# Patient Record
Sex: FEMALE | Race: WHITE | Employment: UNEMPLOYED | URBAN - METROPOLITAN AREA
[De-identification: names, ages, dates, MRNs, and addresses within clinical notes are randomized per-mention and may not be internally consistent; named-entity substitution may affect disease eponyms.]

---

## 2017-03-22 ENCOUNTER — HOSPITAL ENCOUNTER (OUTPATIENT)
Dept: ULTRASOUND IMAGING | Age: 57
Discharge: HOME | End: 2017-03-22
Attending: INTERNAL MEDICINE
Payer: COMMERCIAL

## 2017-03-22 DIAGNOSIS — R10.9 CHRONIC ABDOMINAL PAIN: ICD-10-CM

## 2017-03-22 DIAGNOSIS — R93.89 ABNORMAL FINDING ON CT SCAN: ICD-10-CM

## 2017-03-22 DIAGNOSIS — G89.29 CHRONIC ABDOMINAL PAIN: ICD-10-CM

## 2017-03-22 PROCEDURE — 76700 US EXAM ABDOM COMPLETE: CPT

## 2017-03-23 NOTE — PROGRESS NOTES
Overall your test result are quite good. I do not recommend any treatment changes based on these results.

## 2017-04-04 ENCOUNTER — HOSPITAL ENCOUNTER (OUTPATIENT)
Dept: MAMMOGRAPHY | Age: 57
Discharge: HOME | End: 2017-04-04
Attending: INTERNAL MEDICINE
Payer: COMMERCIAL

## 2017-04-04 DIAGNOSIS — M85.80 OSTEOPENIA: ICD-10-CM

## 2017-04-04 PROCEDURE — 77080 DXA BONE DENSITY AXIAL: CPT

## 2017-04-05 NOTE — PROGRESS NOTES
Your bone density shows osteopenia but not osteoporosis. These values are very slightly worse than prior bone density test.  I recommend you continue exercise and vitamin D/Calcium supplements to protect your bones.

## 2017-04-13 ENCOUNTER — HOSPITAL ENCOUNTER (OUTPATIENT)
Dept: SLEEP MEDICINE | Age: 57
Discharge: HOME | End: 2017-04-13
Payer: COMMERCIAL

## 2017-04-13 PROCEDURE — 95810 POLYSOM 6/> YRS 4/> PARAM: CPT

## 2017-12-27 ENCOUNTER — HOSPITAL ENCOUNTER (OUTPATIENT)
Dept: CT IMAGING | Age: 57
Discharge: HOME | End: 2017-12-27
Attending: OTOLARYNGOLOGY
Payer: MEDICARE

## 2017-12-27 DIAGNOSIS — J32.9 CHRONIC SINUSITIS, UNSPECIFIED LOCATION: ICD-10-CM

## 2017-12-27 PROCEDURE — 70486 CT MAXILLOFACIAL W/O DYE: CPT

## 2018-01-25 ENCOUNTER — HOSPITAL ENCOUNTER (OUTPATIENT)
Dept: SURGERY | Age: 58
Discharge: HOME | End: 2018-01-25

## 2018-01-29 VITALS — WEIGHT: 125 LBS | HEIGHT: 67 IN | BODY MASS INDEX: 19.62 KG/M2

## 2018-01-29 RX ORDER — AZITHROMYCIN 250 MG/1
250 TABLET, FILM COATED ORAL DAILY
COMMUNITY
End: 2018-03-28

## 2018-01-29 NOTE — PERIOP NOTES
Patient verified name, , and surgery as listed in [facility]. Type 1b surgery, phone assessment complete. Orders YES received. Labs per surgeon: none  Labs per anesthesia protocol: hemoglobin to be drawn at outpt lab per protocol; EKG 10/24/16; ECHO 16; most recent cardiology note obtained and placed on chart for reference. Patient answered medical/surgical history questions at their best of ability. All prior to admission medications documented in [facility]. Patient instructed to take the following medications the day of surgery according to anesthesia guidelines with a small sip of water: Claritin, Zantac Timolol eye drops . Hold all vitamins 7 days prior to surgery and NSAIDS 5 days prior to surgery. Medications to be held none    Patient instructed on the following:  Arrive at A Entrance, time of arrival to be called the day before by 1700  NPO after midnight including gum, mints, and ice chips  Responsible adult must drive patient to the hospital, stay during surgery, and patient will  need supervision 24 hours after anesthesia  Use antibacterial soap in shower the night before surgery and on the morning of surgery  Leave all valuables (money and jewelry) at home but bring insurance card and ID on       DOS  Do not wear make-up, nail polish, lotions, cologne, perfumes, powders, or oil on skin. Patient teach back successful and patient demonstrates knowledge of instruction.

## 2018-01-30 ENCOUNTER — HOSPITAL ENCOUNTER (OUTPATIENT)
Dept: LAB | Age: 58
Discharge: HOME | End: 2018-01-30
Attending: OTOLARYNGOLOGY

## 2018-01-30 LAB — HGB BLD-MCNC: 14.4 G/DL (ref 11.7–15.4)

## 2018-01-30 NOTE — PERIOP NOTES
Lab results within limits per anesthesia protocol; OK for surgery.      Recent Results (from the past 12 hour(s))   HEMOGLOBIN    Collection Time: 01/30/18 10:14 AM   Result Value Ref Range    HGB 14.4 11.7 - 15.4 g/dL

## 2018-01-31 ENCOUNTER — ANESTHESIA EVENT (OUTPATIENT)
Dept: SURGERY | Age: 58
End: 2018-01-31
Payer: MEDICARE

## 2018-02-01 ENCOUNTER — ANESTHESIA (OUTPATIENT)
Dept: SURGERY | Age: 58
End: 2018-02-01
Payer: MEDICARE

## 2018-02-01 ENCOUNTER — HOSPITAL ENCOUNTER (OUTPATIENT)
Age: 58
Setting detail: OUTPATIENT SURGERY
Discharge: HOME | End: 2018-02-01
Attending: OTOLARYNGOLOGY | Admitting: OTOLARYNGOLOGY
Payer: MEDICARE

## 2018-02-01 VITALS
BODY MASS INDEX: 20.26 KG/M2 | TEMPERATURE: 96.8 F | WEIGHT: 129.06 LBS | OXYGEN SATURATION: 100 % | HEART RATE: 51 BPM | DIASTOLIC BLOOD PRESSURE: 70 MMHG | RESPIRATION RATE: 18 BRPM | SYSTOLIC BLOOD PRESSURE: 137 MMHG | HEIGHT: 67 IN

## 2018-02-01 PROCEDURE — 74011250637 HC RX REV CODE- 250/637

## 2018-02-01 PROCEDURE — 74011000250 HC RX REV CODE- 250

## 2018-02-01 PROCEDURE — 77030012840 HC ELECTRD COAG SUC CNMD -C: Performed by: OTOLARYNGOLOGY

## 2018-02-01 PROCEDURE — 76210000016 HC OR PH I REC 1 TO 1.5 HR: Performed by: OTOLARYNGOLOGY

## 2018-02-01 PROCEDURE — 77030028681 HC DRSG NSL ABSRB NASOPORE STRY -C: Performed by: OTOLARYNGOLOGY

## 2018-02-01 PROCEDURE — 76210000020 HC REC RM PH II FIRST 0.5 HR: Performed by: OTOLARYNGOLOGY

## 2018-02-01 PROCEDURE — 77030002888 HC SUT CHRMC J&J -A: Performed by: OTOLARYNGOLOGY

## 2018-02-01 PROCEDURE — 77030011640 HC PAD GRND REM COVD -A: Performed by: OTOLARYNGOLOGY

## 2018-02-01 PROCEDURE — 77030002916 HC SUT ETHLN J&J -A: Performed by: OTOLARYNGOLOGY

## 2018-02-01 PROCEDURE — 77030020782 HC GWN BAIR PAWS FLX 3M -B: Performed by: ANESTHESIOLOGY

## 2018-02-01 PROCEDURE — 77030008357 HC SPLNT NSL INT THOM -B: Performed by: OTOLARYNGOLOGY

## 2018-02-01 PROCEDURE — 77030018836 HC SOL IRR NACL ICUM -A: Performed by: OTOLARYNGOLOGY

## 2018-02-01 PROCEDURE — 74011250636 HC RX REV CODE- 250/636: Performed by: ANESTHESIOLOGY

## 2018-02-01 PROCEDURE — 77030008703 HC TU ET UNCUF COVD -A: Performed by: ANESTHESIOLOGY

## 2018-02-01 PROCEDURE — 74011000250 HC RX REV CODE- 250: Performed by: ANESTHESIOLOGY

## 2018-02-01 PROCEDURE — 76010000162 HC OR TIME 1.5 TO 2 HR INTENSV-TIER 1: Performed by: OTOLARYNGOLOGY

## 2018-02-01 PROCEDURE — 74011000250 HC RX REV CODE- 250: Performed by: OTOLARYNGOLOGY

## 2018-02-01 PROCEDURE — 76060000034 HC ANESTHESIA 1.5 TO 2 HR: Performed by: OTOLARYNGOLOGY

## 2018-02-01 PROCEDURE — 77030006908 HC BLD SNUS SHV MEDT -D: Performed by: OTOLARYNGOLOGY

## 2018-02-01 PROCEDURE — 74011250637 HC RX REV CODE- 250/637: Performed by: OTOLARYNGOLOGY

## 2018-02-01 PROCEDURE — 77030003666 HC NDL SPINAL BD -A: Performed by: OTOLARYNGOLOGY

## 2018-02-01 PROCEDURE — 77030008528 HC TBNG IRR MIC/DEB MEDT -B: Performed by: OTOLARYNGOLOGY

## 2018-02-01 PROCEDURE — 77030021678 HC GLIDESCP STAT DISP VERT -B: Performed by: ANESTHESIOLOGY

## 2018-02-01 PROCEDURE — 74011250636 HC RX REV CODE- 250/636

## 2018-02-01 RX ORDER — GLYCOPYRROLATE 0.2 MG/ML
INJECTION INTRAMUSCULAR; INTRAVENOUS AS NEEDED
Status: DISCONTINUED | OUTPATIENT
Start: 2018-02-01 | End: 2018-02-01 | Stop reason: HOSPADM

## 2018-02-01 RX ORDER — LIDOCAINE HYDROCHLORIDE AND EPINEPHRINE 10; 10 MG/ML; UG/ML
INJECTION, SOLUTION INFILTRATION; PERINEURAL AS NEEDED
Status: DISCONTINUED | OUTPATIENT
Start: 2018-02-01 | End: 2018-02-01 | Stop reason: HOSPADM

## 2018-02-01 RX ORDER — ROCURONIUM BROMIDE 10 MG/ML
INJECTION, SOLUTION INTRAVENOUS AS NEEDED
Status: DISCONTINUED | OUTPATIENT
Start: 2018-02-01 | End: 2018-02-01 | Stop reason: HOSPADM

## 2018-02-01 RX ORDER — HYDROMORPHONE HYDROCHLORIDE 2 MG/ML
0.5 INJECTION, SOLUTION INTRAMUSCULAR; INTRAVENOUS; SUBCUTANEOUS
Status: DISCONTINUED | OUTPATIENT
Start: 2018-02-01 | End: 2018-02-01 | Stop reason: HOSPADM

## 2018-02-01 RX ORDER — OXYCODONE HYDROCHLORIDE 5 MG/1
5 TABLET ORAL
Status: DISCONTINUED | OUTPATIENT
Start: 2018-02-01 | End: 2018-02-01 | Stop reason: HOSPADM

## 2018-02-01 RX ORDER — SODIUM CHLORIDE, SODIUM LACTATE, POTASSIUM CHLORIDE, CALCIUM CHLORIDE 600; 310; 30; 20 MG/100ML; MG/100ML; MG/100ML; MG/100ML
100 INJECTION, SOLUTION INTRAVENOUS CONTINUOUS
Status: DISCONTINUED | OUTPATIENT
Start: 2018-02-01 | End: 2018-02-01 | Stop reason: HOSPADM

## 2018-02-01 RX ORDER — ONDANSETRON 2 MG/ML
INJECTION INTRAMUSCULAR; INTRAVENOUS AS NEEDED
Status: DISCONTINUED | OUTPATIENT
Start: 2018-02-01 | End: 2018-02-01 | Stop reason: HOSPADM

## 2018-02-01 RX ORDER — FENTANYL CITRATE 50 UG/ML
INJECTION, SOLUTION INTRAMUSCULAR; INTRAVENOUS AS NEEDED
Status: DISCONTINUED | OUTPATIENT
Start: 2018-02-01 | End: 2018-02-01 | Stop reason: HOSPADM

## 2018-02-01 RX ORDER — NEOSTIGMINE METHYLSULFATE 1 MG/ML
INJECTION INTRAVENOUS AS NEEDED
Status: DISCONTINUED | OUTPATIENT
Start: 2018-02-01 | End: 2018-02-01 | Stop reason: HOSPADM

## 2018-02-01 RX ORDER — GLUCOSAM/CHONDRO/HERB 149/HYAL 750-100 MG
TABLET ORAL
COMMUNITY
End: 2020-11-04

## 2018-02-01 RX ORDER — LIDOCAINE HYDROCHLORIDE 20 MG/ML
INJECTION, SOLUTION EPIDURAL; INFILTRATION; INTRACAUDAL; PERINEURAL AS NEEDED
Status: DISCONTINUED | OUTPATIENT
Start: 2018-02-01 | End: 2018-02-01 | Stop reason: HOSPADM

## 2018-02-01 RX ORDER — OXYMETAZOLINE HCL 0.05 %
SPRAY, NON-AEROSOL (ML) NASAL AS NEEDED
Status: DISCONTINUED | OUTPATIENT
Start: 2018-02-01 | End: 2018-02-01 | Stop reason: HOSPADM

## 2018-02-01 RX ORDER — MIDAZOLAM HYDROCHLORIDE 1 MG/ML
2 INJECTION, SOLUTION INTRAMUSCULAR; INTRAVENOUS
Status: DISCONTINUED | OUTPATIENT
Start: 2018-02-01 | End: 2018-02-01 | Stop reason: HOSPADM

## 2018-02-01 RX ORDER — HYDROXYZINE PAMOATE 25 MG/1
12.5 CAPSULE ORAL
COMMUNITY
End: 2021-07-09 | Stop reason: DRUGHIGH

## 2018-02-01 RX ORDER — PROPOFOL 10 MG/ML
INJECTION, EMULSION INTRAVENOUS AS NEEDED
Status: DISCONTINUED | OUTPATIENT
Start: 2018-02-01 | End: 2018-02-01 | Stop reason: HOSPADM

## 2018-02-01 RX ORDER — EPHEDRINE SULFATE 50 MG/ML
INJECTION, SOLUTION INTRAVENOUS AS NEEDED
Status: DISCONTINUED | OUTPATIENT
Start: 2018-02-01 | End: 2018-02-01 | Stop reason: HOSPADM

## 2018-02-01 RX ORDER — FENTANYL CITRATE 50 UG/ML
100 INJECTION, SOLUTION INTRAMUSCULAR; INTRAVENOUS ONCE
Status: DISCONTINUED | OUTPATIENT
Start: 2018-02-01 | End: 2018-02-01 | Stop reason: HOSPADM

## 2018-02-01 RX ORDER — LIDOCAINE HYDROCHLORIDE 10 MG/ML
0.1 INJECTION INFILTRATION; PERINEURAL AS NEEDED
Status: DISCONTINUED | OUTPATIENT
Start: 2018-02-01 | End: 2018-02-01 | Stop reason: HOSPADM

## 2018-02-01 RX ORDER — MIDAZOLAM HYDROCHLORIDE 1 MG/ML
2 INJECTION, SOLUTION INTRAMUSCULAR; INTRAVENOUS ONCE
Status: COMPLETED | OUTPATIENT
Start: 2018-02-01 | End: 2018-02-01

## 2018-02-01 RX ADMIN — FENTANYL CITRATE 100 MCG: 50 INJECTION, SOLUTION INTRAMUSCULAR; INTRAVENOUS at 07:31

## 2018-02-01 RX ADMIN — LIDOCAINE HYDROCHLORIDE 0.1 ML: 10 INJECTION, SOLUTION INFILTRATION; PERINEURAL at 06:36

## 2018-02-01 RX ADMIN — GLYCOPYRROLATE 0.4 MG: 0.2 INJECTION INTRAMUSCULAR; INTRAVENOUS at 08:46

## 2018-02-01 RX ADMIN — MIDAZOLAM HYDROCHLORIDE 2 MG: 1 INJECTION, SOLUTION INTRAMUSCULAR; INTRAVENOUS at 06:38

## 2018-02-01 RX ADMIN — NEOSTIGMINE METHYLSULFATE 3 MG: 1 INJECTION INTRAVENOUS at 08:46

## 2018-02-01 RX ADMIN — EPHEDRINE SULFATE 10 MG: 50 INJECTION, SOLUTION INTRAVENOUS at 08:11

## 2018-02-01 RX ADMIN — PROPOFOL 150 MG: 10 INJECTION, EMULSION INTRAVENOUS at 07:31

## 2018-02-01 RX ADMIN — ONDANSETRON 4 MG: 2 INJECTION INTRAMUSCULAR; INTRAVENOUS at 07:47

## 2018-02-01 RX ADMIN — SODIUM CHLORIDE, SODIUM LACTATE, POTASSIUM CHLORIDE, AND CALCIUM CHLORIDE: 600; 310; 30; 20 INJECTION, SOLUTION INTRAVENOUS at 08:13

## 2018-02-01 RX ADMIN — FENTANYL CITRATE 25 MCG: 50 INJECTION, SOLUTION INTRAMUSCULAR; INTRAVENOUS at 08:38

## 2018-02-01 RX ADMIN — ROCURONIUM BROMIDE 40 MG: 10 INJECTION, SOLUTION INTRAVENOUS at 07:31

## 2018-02-01 RX ADMIN — LIDOCAINE HYDROCHLORIDE 60 MG: 20 INJECTION, SOLUTION EPIDURAL; INFILTRATION; INTRACAUDAL; PERINEURAL at 07:31

## 2018-02-01 RX ADMIN — HYDROMORPHONE HYDROCHLORIDE 0.5 MG: 2 INJECTION, SOLUTION INTRAMUSCULAR; INTRAVENOUS; SUBCUTANEOUS at 09:20

## 2018-02-01 RX ADMIN — GLYCOPYRROLATE 0.2 MG: 0.2 INJECTION INTRAMUSCULAR; INTRAVENOUS at 07:29

## 2018-02-01 RX ADMIN — SODIUM CHLORIDE, SODIUM LACTATE, POTASSIUM CHLORIDE, AND CALCIUM CHLORIDE 100 ML/HR: 600; 310; 30; 20 INJECTION, SOLUTION INTRAVENOUS at 06:36

## 2018-02-01 RX ADMIN — GLYCOPYRROLATE 0.2 MG: 0.2 INJECTION INTRAMUSCULAR; INTRAVENOUS at 07:31

## 2018-02-01 NOTE — IP AVS SNAPSHOT
[facility] 
 
 
 [address] [id number] 
[phone] Patient: Raza Arzate MRN: [id number] About your hospitalization You were admitted on:  February 1, 2018 You last received care in the:  [facility] PACU You were discharged on:  February 1, 2018 Why you were hospitalized Your primary diagnosis was:  Not on File Follow-up Information Follow up With Details Comments Contact Info Erin Milan MD   [address] [facility] [address] 
[phone] Estuardo Martin MD Schedule an appointment as soon as possible for a visit today make appointment in 3 to 5 days  [address] [address] 
[phone] Your Scheduled Appointments Tuesday February 06, 2018  8:55 AM EST  
POST OP with Estuardo Martin MD  
[address] - [facility] DIV[facility] ENT) [address] [facility] [place] [address]  [id number] [phone] Wednesday February 28, 2018 10:20 AM EST  
(Arrive by 10:05 AM) New Patient Evaluation with PP EMI Metzger Brain [address] ([facility]) [place] [address] [address] Mobile [address] [facility]  
[phone] Wednesday March 28, 2018  1:40 PM EDT Office Visit with Erin Milan MD  
[address]) [address] [facility] [address] [phone] Discharge Orders None A check david indicates which time of day the medication should be taken. My Medications CONTINUE taking these medications Instructions Each Dose to Equal  
 Morning Noon Evening Bedtime ACETYL L-CARNITINE PO Your last dose was: Your next dose is: Take 500 mg by mouth daily. Supplement for Lyme Disease 500 mg  
    
   
   
   
  
 azithromycin 250 mg tablet Commonly known as:  Zorita Fam Your last dose was: Your next dose is: Take 250 mg by mouth daily. To start day before surgery 250 mg CALCIUM 600 + D 600-125 mg-unit Tab Generic drug:  calcium-cholecalciferol (d3) Your last dose was: Your next dose is: Take 1 tablet by mouth daily. Indications: hold for surgery- last dose 9/19/14  
 1 Tab CEREFOLIN NAC tablet Generic drug:  L-Methylfolate-F32-Hmoniytpkt  
   
Your last dose was: Your next dose is: Take 1 tablet by mouth daily. Indications: hold for surgery- last dose 9/19/14  
 1 Tab COPPER PO Your last dose was: Your next dose is: Take 1 Tab by mouth daily. 15 mg  Indications: hold for surgery- last dose 9/19/14  
 1 Tab DHEA PO Your last dose was: Your next dose is: Take 40 mg by mouth daily. 40 mg  
    
   
   
   
  
 dronabinol 2.5 mg capsule Commonly known as:  Antonietrosalba Colmenares Your last dose was: Your next dose is: Take 2.5 mg by mouth as needed. Take 1-4 capsules by mouth three times daily. 2.5 mg  
    
   
   
   
  
 fluconazole 200 mg tablet Commonly known as:  DIFLUCAN Your last dose was: Your next dose is: Take 200 mg by mouth daily. Thurs, Friday of week 1 only of 4week cycle 200 mg  
    
   
   
   
  
 gabapentin 100 mg capsule Commonly known as:  NEURONTIN Your last dose was: Your next dose is: Take 300 mg by mouth nightly. 300 mg HYDROcodone-acetaminophen 5-325 mg per tablet Commonly known as:  Magali Goetz Your last dose was: Your next dose is:    
   
   
 1-2 tab Q4-6 hours PRN pain  
     
   
   
   
  
 hydrOXYzine pamoate 25 mg capsule Commonly known as:  VISTARIL Your last dose was: Your next dose is: Take 25 mg by mouth three (3) times daily as needed for Itching. 25 mg  
    
   
   
   
  
 krill-om-3-dha-epa-phospho-ast 1,197-645-42-50 mg Cap Your last dose was: Your next dose is: Take 1 capsule by mouth two (2) times a day. Indications: hold for surgery- last dose 9/19/14  
 1 Cap LORazepam 1 mg tablet Commonly known as:  ATIVAN Your last dose was: Your next dose is: Take 1.5 mg by mouth nightly. Indications: Insomnia 1.5 mg  
    
   
   
   
  
 magnesium 250 mg Tab Your last dose was: Your next dose is: Take 1,200 mg by mouth daily. 1200 mg  
    
   
   
   
  
 melatonin 1 mg tablet Your last dose was: Your next dose is: Take 7 mg by mouth nightly. 7 mg  
    
   
   
   
  
 minocycline 100 mg capsule Commonly known as:  Lizbet Santos Your last dose was: Your next dose is: Take 100 mg by mouth two (2) times a day. M,W,F week 1 and M,Tues, W week 2 of 5 cycle 100 mg  
    
   
   
   
  
 mirtazapine 15 mg tablet Commonly known as:  Raúl Banegas Your last dose was: Your next dose is: Take 7.5 mg by mouth nightly. Takes for pain and sleep 7.5 mg  
    
   
   
   
  
 MOLYBDENUM (BULK) Your last dose was: Your next dose is:    
   
   
 by Does Not Apply route nightly. Prescribed supplement  
     
   
   
   
  
 multivitamin tablet Commonly known as:  ONE A DAY Your last dose was: Your next dose is: Take 1 Tab by mouth. Take 1 tab twice weekly 1 Tab Omega-3-DHA-EPA-Fish Oil 1,000 mg (120 mg-180 mg) Cap Your last dose was: Your next dose is: Take  by mouth. ondansetron 8 mg disintegrating tablet Commonly known as:  ZOFRAN ODT Your last dose was: Your next dose is:    
   
   
 1 tablet Q 8 hours PRN N/V  
     
   
   
   
  
 PROBIOTIC 4X 10-15 mg Tbec Generic drug:  B.infantis-B.ani-B.long-B.bifi Your last dose was: Your next dose is: Take  by mouth. resveratrol 250 mg Cap Your last dose was: Your next dose is: Take  by mouth two (2) times a day. rifabutin 150 mg capsule Commonly known as:  [employer] Your last dose was: Your next dose is: Take 1 Cap by mouth two (2) times a day. 150mg MWF week 1 and MTW week 2  
 1 Cap  
    
   
   
   
  
 timolol 0.5 % ophthalmic solution Commonly known as:  TIMOPTIC Your last dose was: Your next dose is:    
   
   
 INSTILL 1 DROP IN BOTH EYES DAILY; use day of surgery TURMERIC ROOT EXTRACT PO Your last dose was: Your next dose is: Take  by mouth.  
     
   
   
   
  
 ubiquinol-B12-FA-resveratrol 200-5-0.8-400 mg Cap Your last dose was: Your next dose is: Take  by mouth daily. VITAMIN C 500 mg tablet Generic drug:  ascorbic acid (vitamin C) Your last dose was: Your next dose is: Take 250 mg by mouth daily. 250 mg  
    
   
   
   
  
 VITAMIN D3 2,000 unit Tab Generic drug:  cholecalciferol (vitamin D3) Your last dose was: Your next dose is: Take  by mouth. XIFAXAN 200 mg tablet Generic drug:  rifAXIMin Your last dose was: Your next dose is: Take 550 mg by mouth. Takes  M-F on non-antibiotic weeks 550 mg  
    
   
   
   
  
 ZANTAC 150 mg tablet Generic drug:  raNITIdine Your last dose was: Your next dose is: Take 150 mg by mouth as needed. Take / use AM day of surgery  per anesthesia protocols.   
 150 mg  
    
   
   
 ZINC ACETATE PO Your last dose was: Your next dose is: Take  by mouth daily. STOP taking these medications   
 loratadine 10 mg tablet Commonly known as:  Hoang Cousin Discharge Instructions Nasal Septum Repair: What to Expect at Home Your Recovery You may have some swelling of your nose, upper lip, cheeks, or around your eyes after nasal surgery. You may have some bruises around your nose and eyes. Your nose may be sore and will bleed. This may last for several days after surgery. The tip of your nose and your upper lip and gums may be numb. Feeling will return in a few weeks to a few months. Your sense of smell may not be as good after surgery. But it will improve and will often return to normal in 1 to 2 months. You will have a drip pad under your nose to collect mucus and blood. Change it only when it bleeds through. You may have to do this every hour for 24 hours after surgery. You will probably be able to return to work or school in a few days and to your normal routine in about 3 weeks. But this varies with your job and how much surgery you had. Most people recover fully in 1 to 2 months. You will have to visit your doctor during the 3 to 4 months after your surgery. Your doctor will check to see that your nose is healing well. This care sheet gives you a general idea about how long it will take for you to recover. But each person recovers at a different pace. Follow the steps below to get better as quickly as possible. How can you care for yourself at home? Activity ? · Rest when you feel tired. Getting enough sleep will help you recover. Do not lie flat. Raise your head with two or three pillows. This can reduce swelling. Try to sleep on your back for the month after surgery. You can also sleep in a reclining chair. ? · Try to walk each day.  Start by walking a little more than you did the day before. Bit by bit, increase the amount you walk. Walking boosts blood flow and helps prevent pneumonia and constipation. Also, try to sit and stand as much as you can.  
? · For 1 week, try not to bend over or lift anything heavier than 10 pounds. This may include a child, heavy grocery bags and milk containers, a heavy briefcase or backpack, cat litter or dog food bags, or a vacuum . ? · You can take a shower or bath. Avoid swimming for 6 weeks. ? · Avoid strenuous activities, such as bicycle riding, jogging, weight lifting, or aerobic exercise, for 1 week or until your doctor says it is okay. ? · You may drive when you are no longer taking prescription pain pills and feel up to it. Diet ? · You can eat your normal diet. If your stomach is upset, try bland, low-fat foods like plain rice, broiled chicken, toast, and yogurt. ? · You may notice that your bowel movements are not regular right after your surgery. This is common. Try to avoid constipation and straining with bowel movements. You may want to take a fiber supplement every day. If you have not had a bowel movement after a couple of days, ask your doctor about taking a mild laxative. Medicines ? · Your doctor will tell you if and when you can restart your medicines. He or she will also give you instructions about taking any new medicines. ? · If you take blood thinners, such as warfarin (Coumadin), clopidogrel (Plavix), or aspirin, be sure to talk to your doctor. He or she will tell you if and when to start taking those medicines again. Make sure that you understand exactly what your doctor wants you to do. ? · Do not take aspirin, aspirin-containing medicines, or anti-inflammatory medicines such as ibuprofen (Advil, Motrin) or naproxen (Aleve) for 3 weeks following surgery unless your doctor says it is okay. ? · Take pain medicines exactly as directed.  
¨ If the doctor gave you a prescription medicine for pain, take it as prescribed. ¨ Do not take two or more pain medicines at the same time unless the doctor told you to. Many pain medicines have acetaminophen, which is Tylenol. Too much acetaminophen (Tylenol) can be harmful. ? · If your doctor prescribed antibiotics, take them as directed. Do not stop taking them just because you feel better. You need to take the full course of antibiotics. ? · If you think your pain medicine is making you sick to your stomach: 
¨ Take your medicine after meals (unless your doctor has told you not to). ¨ Ask your doctor for a different pain medicine. Incision care ? · You will have a drip pad under your nose to collect blood. Change it only when it has bled through. You may have to do this every hour for 24 hours after surgery. When bleeding stops, you can remove it. ? · If you have packing in your nose, leave it in. Your doctor will take it out. Ice and elevation ? · To help with swelling and pain, put ice or a cold pack on your nose for 10 to 20 minutes at a time. Put a thin cloth between the ice and your skin. ? · Sleep with your head raised up. You can also sleep in a reclining chair. Other instructions ? · Do not blow your nose for 1 week after surgery. ? · Do not put anything into your nose. ? · If you must sneeze, open your mouth and sneeze naturally. ? · After any packing is removed, use saline (saltwater) nasal washes to help keep your nasal passages open and wash out mucus and bacteria. You can buy saline nose drops at a grocery store or drugstore. Or you can make your own at home by adding 1 teaspoon of salt and 1 teaspoon of baking soda to 2 cups of distilled water. If you make your own, fill a bulb syringe with the solution, insert the tip into your nostril, and squeeze gently. Airam Solian your nose. ? · You can wear your glasses when you wish. Do not wear contacts until the day after the surgery. Follow-up care is a key part of your treatment and safety. Be sure to make and go to all appointments, and call your doctor if you are having problems. It's also a good idea to know your test results and keep a list of the medicines you take. When should you call for help? Call 911 anytime you think you may need emergency care. For example, call if: 
? · You passed out (lost consciousness). ? · You have severe trouble breathing. ?Call your doctor now or seek immediate medical care if: 
? · You have bleeding through the nasal packing that is not slowing. ? · You have symptoms of infection, such as: 
¨ Increased pain, swelling, warmth, or redness. ¨ Red streaks coming from the area. ¨ Pus draining from the area. ¨ A fever. ? · You have pain that does not get better after you take pain pills. ? Watch closely for any changes in your health, and be sure to contact your doctor if: 
? · You do not get better as expected. Where can you learn more? Go to http://marquita-cathleen.info/. Enter J762 in the search box to learn more about \"Nasal Septum Repair: What to Expect at Home. \" Current as of: May 12, 2017 Content Version: 11.4 © 7194-4741 Healthwise, Incorporated. Care instructions adapted under license by [employer] (which disclaims liability or warranty for this information). If you have questions about a medical condition or this instruction, always ask your healthcare professional. [place] [address] any warranty or liability for your use of this information. Introducing [facility]! Dear Rk Jose: 
Thank you for requesting a iOculi account. Our records indicate that you already have an active iOculi account. You can access your account anytime at [email or web address] Did you know that you can access your hospital and ER discharge instructions at any time in iOculi?   You can also review all of your test results from your hospital stay or ER visit. Additional Information If you have questions, please visit the Frequently Asked Questions section of the [employer] website at [email or web address] Remember, MyChart is NOT to be used for urgent needs. For medical emergencies, dial 911. Now available from your iPhone and Android! Providers Seen During Your Hospitalization Provider Specialty Primary office phone Jonatahn Chaudhry MD Otolaryngology [phone] Your Primary Care Physician (PCP) Primary Care Physician Office Phone Office Fax Brooke Chavez [phone] You are allergic to the following Allergen Reactions Latex, Natural Rubber Rash Dexamethasone Other (comments) Infectious disease MD, Dr. Raul Chao states that steroids adversely affect Lyme disease; do not use with anesthesia Other Medication Itching IV anitbiotic~can't remember which one~she had to premedicate with Benadryl Recent Documentation Height Weight BMI OB Status Smoking Status 1.702 m 58.5 kg 20.21 kg/m2 Postmenopausal Never Smoker Emergency Contacts Name Discharge Info Relation Home Work Mobile Yazan Hall CAREGIVER [3] Friend [5] [phone] Syliva Chana [5] [phone] Patient Belongings The following personal items are in your possession at time of discharge: 
  Dental Appliances: None  Visual Aid: Glasses Please provide this summary of care documentation to your next provider. Signatures-by signing, you are acknowledging that this After Visit Summary has been reviewed with you and you have received a copy. Patient Signature:  ____________________________________________________________ Date:  ____________________________________________________________  
  
Yenni Cannon  Provider Signature: ____________________________________________________________ Date:  ____________________________________________________________

## 2018-02-01 NOTE — BRIEF OP NOTE
BRIEF OPERATIVE NOTE    Date of Procedure: 2/1/2018   Preoperative Diagnosis: Chronic sinusitis, unspecified location [J32.9]  Deviated septum [J34.2]  Nasal turbinate hypertrophy [J34.3]  Nasal obstruction [J34.89]  Postoperative Diagnosis: Chronic sinusitis, unspecified location [J32.9]  Deviated septum [J34.2]  Nasal turbinate hypertrophy [J34.3]  Nasal obstruction [J34.89]    Procedure(s):  SEPTOPLASTY  IMAGE GUIDED FUNCTIONAL ENDOSCOPIC SINUS SURGERY   Surgeon(s) and Role:     * Lillian Beal MD - Primary         Assistant Staff: None      Surgical Staff:  Circ-1: Gerson Leal RN  Scrub Tech-1: Aramis Paul  Event Time In   Incision Start 5179   Incision Close 0845     Anesthesia: General   Estimated Blood Loss: 100 cc  Specimens: * No specimens in log *   Findings: none   Complications: none  Implants: * No implants in log *

## 2018-02-01 NOTE — DISCHARGE INSTRUCTIONS
Nasal Septum Repair: What to Expect at [address] [place] [address] may have some swelling of your nose, upper lip, cheeks, or around your eyes after nasal surgery. You may have some bruises around your nose and eyes. Your nose may be sore and will bleed. This may last for several days after surgery. The tip of your nose and your upper lip and gums may be numb. Feeling will return in a few weeks to a few months. Your sense of smell may not be as good after surgery. But it will improve and will often return to normal in 1 to 2 months. You will have a drip pad under your nose to collect mucus and blood. Change it only when it bleeds through. You may have to do this every hour for 24 hours after surgery. You will probably be able to return to work or school in a few days and to your normal routine in about 3 weeks. But this varies with your job and how much surgery you had. Most people recover fully in 1 to 2 months. You will have to visit your doctor during the 3 to 4 months after your surgery. Your doctor will check to see that your nose is healing well. This care sheet gives you a general idea about how long it will take for you to recover. But each person recovers at a different pace. Follow the steps below to get better as quickly as possible. How can you care for yourself at home? Activity  ? · Rest when you feel tired. Getting enough sleep will help you recover. Do not lie flat. Raise your head with two or three pillows. This can reduce swelling. Try to sleep on your back for the month after surgery. You can also sleep in a reclining chair. ? · Try to walk each day. Start by walking a little more than you did the day before. Bit by bit, increase the amount you walk. Walking boosts blood flow and helps prevent pneumonia and constipation. Also, try to sit and stand as much as you can.   ? · For 1 week, try not to bend over or lift anything heavier than 10 pounds.  This may include a child, heavy grocery bags and milk containers, a heavy briefcase or backpack, cat litter or dog food bags, or a vacuum . ? · You can take a shower or bath. Avoid swimming for 6 weeks. ? · Avoid strenuous activities, such as bicycle riding, jogging, weight lifting, or aerobic exercise, for 1 week or until your doctor says it is okay. ? · You may drive when you are no longer taking prescription pain pills and feel up to it. Diet  ? · You can eat your normal diet. If your stomach is upset, try bland, low-fat foods like plain rice, broiled chicken, toast, and yogurt. ? · You may notice that your bowel movements are not regular right after your surgery. This is common. Try to avoid constipation and straining with bowel movements. You may want to take a fiber supplement every day. If you have not had a bowel movement after a couple of days, ask your doctor about taking a mild laxative. Medicines  ? · Your doctor will tell you if and when you can restart your medicines. He or she will also give you instructions about taking any new medicines. ? · If you take blood thinners, such as warfarin (Coumadin), clopidogrel (Plavix), or aspirin, be sure to talk to your doctor. He or she will tell you if and when to start taking those medicines again. Make sure that you understand exactly what your doctor wants you to do. ? · Do not take aspirin, aspirin-containing medicines, or anti-inflammatory medicines such as ibuprofen (Advil, Motrin) or naproxen (Aleve) for 3 weeks following surgery unless your doctor says it is okay. ? · Take pain medicines exactly as directed. ¨ If the doctor gave you a prescription medicine for pain, take it as prescribed. ¨ Do not take two or more pain medicines at the same time unless the doctor told you to. Many pain medicines have acetaminophen, which is Tylenol. Too much acetaminophen (Tylenol) can be harmful. ? · If your doctor prescribed antibiotics, take them as directed.  Do not stop taking them just because you feel better. You need to take the full course of antibiotics. ? · If you think your pain medicine is making you sick to your stomach:  ¨ Take your medicine after meals (unless your doctor has told you not to). ¨ Ask your doctor for a different pain medicine. Incision care  ? · You will have a drip pad under your nose to collect blood. Change it only when it has bled through. You may have to do this every hour for 24 hours after surgery. When bleeding stops, you can remove it. ? · If you have packing in your nose, leave it in. Your doctor will take it out. Ice and elevation  ? · To help with swelling and pain, put ice or a cold pack on your nose for 10 to 20 minutes at a time. Put a thin cloth between the ice and your skin. ? · Sleep with your head raised up. You can also sleep in a reclining chair. Other instructions  ? · Do not blow your nose for 1 week after surgery. ? · Do not put anything into your nose. ? · If you must sneeze, open your mouth and sneeze naturally. ? · After any packing is removed, use saline (saltwater) nasal washes to help keep your nasal passages open and wash out mucus and bacteria. You can buy saline nose drops at a grocery store or drugstore. Or you can make your own at home by adding 1 teaspoon of salt and 1 teaspoon of baking soda to 2 cups of distilled water. If you make your own, fill a bulb syringe with the solution, insert the tip into your nostril, and squeeze gently. Xochilt Prom your nose. ? · You can wear your glasses when you wish. Do not wear contacts until the day after the surgery. Follow-up care is a key part of your treatment and safety. Be sure to make and go to all appointments, and call your doctor if you are having problems. It's also a good idea to know your test results and keep a list of the medicines you take. When should you call for help? Call 911 anytime you think you may need emergency care.  For example, call if:  ? · You passed out (lost consciousness). ? · You have severe trouble breathing. ?Call your doctor now or seek immediate medical care if:  ? · You have bleeding through the nasal packing that is not slowing. ? · You have symptoms of infection, such as:  ¨ Increased pain, swelling, warmth, or redness. ¨ Red streaks coming from the area. ¨ Pus draining from the area. ¨ A fever. ? · You have pain that does not get better after you take pain pills. ? Watch closely for any changes in your health, and be sure to contact your doctor if:  ? · You do not get better as expected. Where can you learn more? Go to http://marquita-cathleen.info/. Enter Y669 in the search box to learn more about \"Nasal Septum Repair: What to Expect at Home. \"  Current as of: May 12, 2017  Content Version: 11.4  © 6061-7552 [employer], [employer]. Care instructions adapted under license by [employer] (which disclaims liability or warranty for this information). If you have questions about a medical condition or this instruction, always ask your healthcare professional. [address] any warranty or liability for your use of this information.

## 2018-02-01 NOTE — ANESTHESIA POSTPROCEDURE EVALUATION
Post-Anesthesia Evaluation and Assessment    Patient: Abner Vega MRN: [id number]  SSN: [id number]    YOB: 1960  Age: 61 y.o. Sex: female       Cardiovascular Function/Vital Signs  Visit Vitals    /70    Pulse (!) 51    Temp 36 °C (96.8 °F)    Resp 18    Ht 5' 7\" (1.702 m)    Wt 58.5 kg (129 lb 1 oz)    SpO2 100%    BMI 20.21 kg/m2       Patient is status post general anesthesia for Procedure(s):  SEPTOPLASTY  IMAGE GUIDED FUNCTIONAL ENDOSCOPIC SINUS SURGERY . Nausea/Vomiting: None    Postoperative hydration reviewed and adequate. Pain:  Pain Scale 1: Visual (02/01/18 0933)  Pain Intensity 1: 0 (02/01/18 0933)   Managed    Neurological Status:   Neuro (WDL): Within Defined Limits (02/01/18 0933)  Neuro  Neurologic State: Drowsy (02/01/18 2580)  Orientation Level: Oriented to person;Oriented to place (02/01/18 0933)   At baseline    Mental Status and Level of Consciousness: Awake. Pulmonary Status:   O2 Device: Room air (02/01/18 0955)   Adequate oxygenation and airway patent    Complications related to anesthesia: None    Post-anesthesia assessment completed.  No concerns    Signed By: Mal Brittle, MD     February 1, 2018

## 2018-02-01 NOTE — ANESTHESIA PREPROCEDURE EVALUATION
Anesthetic History     PONV          Review of Systems / Medical History  Patient summary reviewed and pertinent labs reviewed    Pulmonary  Within defined limits                 Neuro/Psych         Psychiatric history     Cardiovascular            Dysrhythmias (resolved)       Exercise tolerance: >4 METS     GI/Hepatic/Renal                Endo/Other      Hypothyroidism  Arthritis and anemia     Other Findings   Comments: Lyme Disease  Glaucoma         Physical Exam    Airway  Mallampati: II  TM Distance: < 4 cm  Neck ROM: normal range of motion   Mouth opening: Normal     Cardiovascular    Rhythm: regular  Rate: normal         Dental  No notable dental hx       Pulmonary  Breath sounds clear to auscultation               Abdominal  GI exam deferred       Other Findings            Anesthetic Plan    ASA: 2  Anesthesia type: general          Induction: Intravenous  Anesthetic plan and risks discussed with: Patient      Glidescope in room. No steroids.

## 2018-02-02 NOTE — OP NOTES
[facility]  OPERATIVE REPORT    Lieutenant Christopher  MR#: [id number]  : 1960  ACCOUNT #: [de-identified]   DATE OF SERVICE: 2018    PREOPERATIVE DIAGNOSES:  1. Chronic nasal obstruction. 2.  Chronic rhinosinusitis. POSTOPERATIVE  DIAGNOSES:  1. Chronic nasal obstruction. 2.  Chronic rhinosinusitis. PROCEDURES PERFORMED:   1. Septoplasty. 2.  Submucous resection of bilateral inferior turbinates. 3.  Bilateral anterior and posterior ethmoidectomies with maxillary antrostomies. 4.  Bilateral sphenoidotomies. 5.  Bilateral frontal sinuplasty. SURGEON: Edda Springer MD    ASSISTANT: None. INDICATIONS FOR SURGERY:  This is a 18-year-old female who has a history of chronic and recurrent rhinosinusitis that had become refractory to aggressive medical therapy. 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 Therefore, the patient has elected surgical intervention. DESCRIPTION OF PROCEDURE: The patient was taken to the operating room where she underwent general endotracheal anesthesia. Prepped and draped in usual fashion for operative approach to the nasal cavity. Local anesthesia was administered to the septum using 1% Xylocaine with 1:200,000 epinephrine. A left hemitransfixion incision was made with a 15 blade and a left mucoperichondrial and mucoperiosteal flap elevated with some difficulty due to the fact the patient had prior septoplasty surgery. However, the focus of that surgery had been more low in the quadrangular cartilage and did not address the bony deflection compressing the middle meatus and obstructing visualization and approach to the sinuses. The bony cartilaginous junction was  and the bony flexion and cartilaginous component creating the obstruction to the sinuses was removed with a combination of Daisha forceps and open biting Serg-Silvia rongeurs.   At this point, the hemitransfixion incision was closed using interrupted 5-0 chromic suture. 4-0 plain quilting stitch placed without difficulty. Attention was then directed to the inferior turbinates, which demonstrated marked compensatory hypertrophy. A submucous resection was undertaken utilizing the microdebrider technique. The remaining portion of turbinate was outfractured with the [facility]. At this point, the fusion image-guided system was calibrated and used throughout the remaining portion of the surgery. Attention was directed to the left middle meatus. Local anesthesia was administered anterior to the uncinate process. This was poorly visualized due to a very bulbous middle turbinate. The lateral half of the middle turbinate was trimmed with the microdebrider, fully exposing the uncinate, which was then taken down with a combination of backbiting forceps and microdebrider. Maxillary sinus ostium was found to be markedly obstructed to include blockage from an overhanging Lavina air cell. With a combination of the backbiting forceps and straight open biting Jose Juan forceps and microdebrider, maxillary sinus ostium was enlarged and the Radu air cell removed. A large obstructing bulla air cell was then penetrated with the microdebrider as the anterior and posterior ethmoids were opened. This also gained access to the sphenoid rostrum where the natural ostium was identified and enlarged with the microdebrider. Attention was then directed to the nasal frontal recess where careful attention was taken to remove the posterior inferior wall of the agger nasi air cell, allow for removal of other bone spicules and damaged mucosa to completely open up the nasal frontal recess. This was confirmed with the frontal sinus seeker as well as direct visualization. NasoPore packing was then placed in the middle meatus and attention directed to the opposite side where a similar procedure was performed with nearly identical findings.   Anterior and posterior ethmoids, maxillary, sphenoid and frontal all opened in a fashion similar to the left side. After placing the right-sided NasoPore, bilateral Dupree splints were positioned, which were coated in bacitracin ointment. These were then held in place with a 3-0 nylon suture. FINAL BLOOD LOSS:  100 mL. ANESTHESIA:  General anesthesia. IMPLANTS: No implants.       SPECIMENS REMOVED:     COMPLICATIONS:       MD Brittany Stanford  D: 02/02/2018 08:00     T: 02/02/2018 11:50  JOB #: [id number]

## 2018-02-28 PROBLEM — R40.0 DAYTIME SLEEPINESS: Status: ACTIVE | Noted: 2018-02-28

## 2018-02-28 PROBLEM — R06.83 SNORING: Status: ACTIVE | Noted: 2018-02-28

## 2018-02-28 PROBLEM — G47.61 PLMD (PERIODIC LIMB MOVEMENT DISORDER): Status: ACTIVE | Noted: 2018-02-28

## 2018-02-28 PROBLEM — Z98.890 S/P SINUS SURGERY: Status: ACTIVE | Noted: 2018-02-28

## 2018-03-28 PROBLEM — R06.83 SNORING: Status: RESOLVED | Noted: 2018-02-28 | Resolved: 2018-03-28

## 2019-05-07 PROBLEM — M47.816 OSTEOARTHRITIS OF LUMBAR SPINE: Status: ACTIVE | Noted: 2019-05-07

## 2019-05-07 PROBLEM — M85.80 OSTEOPENIA: Status: ACTIVE | Noted: 2019-05-07

## 2019-07-10 ENCOUNTER — HOSPITAL ENCOUNTER (OUTPATIENT)
Dept: MAMMOGRAPHY | Age: 59
Discharge: HOME | End: 2019-07-10
Attending: INTERNAL MEDICINE
Payer: MEDICARE

## 2019-07-10 DIAGNOSIS — M85.80 OSTEOPENIA, UNSPECIFIED LOCATION: ICD-10-CM

## 2019-07-10 DIAGNOSIS — M89.9 BONE DISORDER: ICD-10-CM

## 2019-07-10 PROCEDURE — 77080 DXA BONE DENSITY AXIAL: CPT

## 2019-07-10 NOTE — PROGRESS NOTES
Bone density shows osteopenia but NOT osteoporosis. No changes in treatment are needed at this time based on these results.

## 2019-12-02 ENCOUNTER — HOSPITAL ENCOUNTER (OUTPATIENT)
Dept: ULTRASOUND IMAGING | Age: 59
Discharge: HOME | End: 2019-12-02
Attending: INTERNAL MEDICINE
Payer: MEDICARE

## 2019-12-02 DIAGNOSIS — Z86.39 HISTORY OF THYROID NODULE: ICD-10-CM

## 2019-12-02 PROCEDURE — 76536 US EXAM OF HEAD AND NECK: CPT

## 2019-12-04 NOTE — PROGRESS NOTES
These results are overall reassuring in that nodules are small. Follow up [address] in 1 year is recommended.

## 2020-01-13 PROBLEM — R53.82 CHRONIC FATIGUE: Status: ACTIVE | Noted: 2020-01-13

## 2020-11-05 ENCOUNTER — HOSPITAL ENCOUNTER (OUTPATIENT)
Dept: PHYSICAL THERAPY | Age: 60
Discharge: HOME | End: 2020-11-05
Payer: MEDICARE

## 2020-11-05 PROCEDURE — 97161 PT EVAL LOW COMPLEX 20 MIN: CPT

## 2020-11-05 NOTE — PROGRESS NOTES
Juana Patel  : 1960  Primary: Pete Baird Medicare Advantage  Secondary:  [address]  at [facility]  [address] [place] [address]  [phone]   [phone]      OUTPATIENT PHYSICAL THERAPY: Daily Treatment Note 2020  Visit Count:  1    ICD-10: Treatment Diagnosis:   Lateral Epicondylitis, Left M77.12  Pain in joint, Left Elbow M25.522  Precautions/Allergies:   Latex, natural rubber; Dexamethasone; and Other medication   TREATMENT PLAN:  Effective Dates: 2020 TO 2/3/2021 (90 days). Frequency/Duration: 2 times a week for 4 weeks then 1 time per week for 8 weeks Day(s) MEDICAL/REFERRING DIAGNOSIS:  Lateral epicondylitis, left elbow [M77.12]  DATE OF ONSET: 2020  REFERRING PHYSICIAN: Yee, Not On File, MD Dr. Jaci Diaz MD Orders: Evaluate and Treat  Return MD Appointment: TBD       Pre-treatment Symptoms/Complaints:  Pt reports having pain in the left elbow that has increased over the past few months with increased activity load. Pt reports that the pain can be quite intense if put in the wrong position or overused. Pain: Initial: Pain Intensity 1: 8/10 Post Session:  10   Medications Last Reviewed:  2020  Updated Objective Findings:  See evaluation note from today  TREATMENT:     MANUAL THERAPY: (0 minutes): Joint mobilization and Soft tissue mobilization was utilized and necessary because of the patient's restricted joint motion, loss of articular motion and restricted motion of soft tissue. - superficial fascial mobilization of proximal wrist extensors immediately distal to the lateral epicondyle   - Manual stretch of the extensors into wrist flexion and radial deviation with full elbow extension  [employer] Portal  Treatment/Session Summary:    · Response to Treatment:  Pt responded well to manual therapy and progression of treatment.  Pt will benefit from skilled PT as we address impairments and progress towards goals..  · Communication/Consultation:  None today  · Equipment provided today:  None today  · Recommendations/Intent for next treatment session: Next visit will focus on shoulder ROM and function.     Total Treatment Billable Duration:  0 minutes and EVAL  PT Patient Time In/Time Out  Time In: 1115  Time Out: [address] PT    Future Appointments   Date Time Provider Jono Butcher   11/10/2020 12:00 PM Ino Parisi PT [facility]   11/13/2020  8:00 AM Jace Collier PT [facility]   11/17/2020  1:00 PM Jace Collier, PT [facility]   11/24/2020  1:00 PM Ino Parisi PT [facility]

## 2020-11-05 NOTE — THERAPY EVALUATION
Chris Patel  : 1960  Primary: Katerina Trinidad Medicare Advantage  Secondary:  [address]  at [address]  [phone]   [phone]         OUTPATIENT PHYSICAL THERAPY:Initial Assessment 2020   ICD-10: Treatment Diagnosis:   Lateral Epicondylitis, Left M77.12  Pain in joint, Left Elbow M25.522  Precautions/Allergies:   Latex, natural rubber; Dexamethasone; and Other medication   TREATMENT PLAN:  Effective Dates: 2020 TO 2/3/2021 (90 days). Frequency/Duration: 2 times a week for 4 weeks then 1 time per week for 8 weeks Day(s) MEDICAL/REFERRING DIAGNOSIS:  Lateral epicondylitis, left elbow [M77.12]  DATE OF ONSET: 2020  REFERRING PHYSICIAN:Dr. Zoraida Schofield MD Orders: Evaluate and Treat  Return MD Appointment: TBD     INITIAL ASSESSMENT:  Ms. Annita Flanagan presents to the clinic with pain, strength, and functional deficits in the left elbow with signs and symptoms consistent with lateral epicondylitis. Pt shoulder ROM and functional impairments play a role in the elbow compensating to continue performing daily function and activities. Pt has marked weakness between left and right  strength that could be caused by a radial nerve compression at the elbow. Pt has Lyme's disease which can affect treatment prognosis. Pt needs skilled PT to help address impairments and improve function as we progress towards goals. Yue Peters, PT, DPT, OCS, CFMT     PROBLEM LIST (Impacting functional limitations):  1. Decreased Strength  2. Decreased ADL/Functional Activities  3. Increased Pain  4. Decreased Activity Tolerance  5. Decreased Flexibility/Joint Mobility INTERVENTIONS PLANNED: (Treatment may consist of any combination of the following)  1. Cryotherapy  2. Electrical Stimulation  3. Home Exercise Program (HEP)  4. Manual Therapy  5. Neuromuscular Re-education/Strengthening  6. Range of Motion (ROM)  7.  Therapeutic Activites  8. Therapeutic Exercise/Strengthening     GOALS: (Goals have been discussed and agreed upon with patient.)  Short-Term Functional Goals: Time Frame: 4 weeks  1. Pt will demonstrate ability to perform initial HEP to help progress treatment  2. Pt will report 4/10 pain at worst to help improve quality of life  3. Pt will demonstrate an improvement in hand strength by 5 pounds in the left hand to show returning of strength in the L upper extermity  Discharge Goals: Time Frame: 12 weeks  1. Pt will demonstrate independence with final HEP  2. Pt will demonstrate ability to  a bottle with affect UE without pain to assist with ADLs  3. Pt will report ability to play guitar without pain in the lateral epicondyle  4. Pt will report ability to perform yoga routines without interruption from forearm pain  5. Pt will improve DASH by 10 points to improve daily function    OUTCOME MEASURE:   Tool Used: Disabilities of the Arm, Shoulder and Hand (DASH) Questionnaire - Quick Version  Score:  Initial: 39/55  Most Recent: X/55 (Date: -- )   Interpretation of Score: The DASH is designed to measure the activities of daily living in person's with upper extremity dysfunction or pain. Each section is scored on a 1-5 scale, 5 representing the greatest disability. The scores of each section are added together for a total score of 55. MEDICAL NECESSITY:   · Patient is expected to demonstrate progress in strength, range of motion, coordination and functional technique to improve daily function and recreational tasks. · Patient demonstrates good rehab potential due to higher previous functional level. REASON FOR SERVICES/OTHER COMMENTS:  · Patient requires therapy to improve on limitations listed.   Total Duration:  PT Patient Time In/Time Out  Time In: 1115  Time Out: 1210    Rehabilitation Potential For Stated Goals: Good  Regarding Rene Patel's therapy, I certify that the treatment plan above will be carried out by a therapist or under their direction. Thank you for this referral,  Lito Monte, PT     Referring Physician Signature: Dr. Michael Otero _______________________________ Date _____________     PAIN/SUBJECTIVE:   Initial: Pain Intensity 1: 8 at worst Post Session:  3/10   HISTORY:   History of Injury/Illness (Reason for Referral):  Pt arrived to the clinic with pain in the left elbow. Pt states that she has been dealing with this pain since March of 2020, but did not know if it was just a result of her lyme's disease. Pt states that during the pandemic, her activity in yoga and playing the guitar increased considerably, which could have flared the condition. Pt reports that she is in a considerable amount of pain when she plays guitar or when performing closed chain yoga exercises. Pt states that she is R hand dominant, but sleeps on her L side which can increase symptoms. Pt also states that she has fallen on her L shoulder in the past. Pt wants to return to PLOF without pain. Past Medical History/Comorbidities:   Ms. Judit Davis  has a past medical history of Anemia, Arthritis, Cholecystitis (8/28/2014), Chronic pain, Glaucoma, History of skin cancer, Multiple thyroid nodules, Obstructive sleep apnea, Osteoarthritis of lumbar spine, Osteopenia, and Tennis elbow.  She also has no past medical history of Adverse effect of anesthesia, Aneurysm ([address] Asthma, Autoimmune disease ([address] CAD (coronary artery disease), Chronic kidney disease, Chronic obstructive pulmonary disease ([address] Coagulation disorder ([address] Diabetes ([address] Difficult intubation, Endocarditis, GERD (gastroesophageal reflux disease), Heart failure ([address] Hypertension, Liver disease, Malignant hyperthermia due to anesthesia, Morbid obesity ([address] Nicotine vapor product user, Non-nicotine vapor product user, Pseudocholinesterase deficiency, Psychiatric disorder, PUD (peptic ulcer disease), Rheumatic fever, Seizures ([address] Stroke ([address] or Thromboembolus ([facility] Ms. Darrell Terrazas  has a past surgical history that includes hx wisdom teeth extraction; hx appendectomy (age 9); hx rhinoplasty (1990); hx colonoscopy; hx cholecystectomy (2014); hx vascular access; hx maxillofacial; hx septoplasty (02/01/2018); and hx cataract removal (Bilateral, 12/2019).   Social History/Living Environment:       Social History     Socioeconomic History    Marital status: SINGLE     Spouse name: Not on file    Number of children: Not on file    Years of education: Not on file    Highest education level: Not on file   Occupational History    Occupation: not currently employed   Social Needs    Financial resource strain: Not on file    Food insecurity     Worry: Not on file     Inability: Not on file   [employer] needs     Medical: Not on file     Non-medical: Not on file   Tobacco Use    Smoking status: Never Smoker    Smokeless tobacco: Never Used   Substance and Sexual Activity    Alcohol use: No     Alcohol/week: 0.0 standard drinks    Drug use: No     Comment: none    Sexual activity: Not on file   Lifestyle    Physical activity     Days per week: Not on file     Minutes per session: Not on file    Stress: Not on file   Relationships    Social connections     Talks on phone: Not on file     Gets together: Not on file     Attends Taoism service: Not on file     Active member of club or organization: Not on file     Attends meetings of clubs or organizations: Not on file     Relationship status: Not on file    Intimate partner violence     Fear of current or ex partner: Not on file     Emotionally abused: Not on file     Physically abused: Not on file     Forced sexual activity: Not on file   Other Topics Concern    Not on file   Social History Narrative    Has a Master's degree    Lives alone       Prior Level of Function/Work/Activity:  Independent with activity without pain  Out of work   Dominant Side:         RIGHT   Ambulatory/Rehab Services H2 Model Falls Risk Assessment   Risk Factors:       No Risk Factors Identified Ability to Rise from Chair:       (0)  Ability to rise in a single movement   Falls Prevention Plan:       No modifications necessary   Total: (5 or greater = High Risk): 0   ©2010 [facility]. All Rights Reserved. [facility] Patent #[id number]. Federal Law prohibits the replication, distribution or use without written permission from [facility] [employer]   Current Medications:       Current Outpatient Medications:     hydrOXYzine pamoate (VISTARIL) 25 mg capsule, Take 12.5 mg by mouth nightly., Disp: , Rfl:     ondansetron (ZOFRAN ODT) 8 mg disintegrating tablet, 1 tablet Q 8 hours PRN N/V, Disp: 8 Tab, Rfl: 0    timolol (TIMOPTIC) 0.5 % ophthalmic solution, INSTILL 1 DROP IN BOTH EYES DAILY; use day of surgery, Disp: , Rfl: 2    cholecalciferol, vitamin D3, (VITAMIN D3) 2,000 unit tab, Take  by mouth., Disp: , Rfl:     multivitamin (ONE A DAY) tablet, Take 1 Tab by mouth. Take 1 tab twice weekly, Disp: , Rfl:     rifaximin (XIFAXAN) 200 mg tablet, Take 550 mg by mouth. Takes  M-F on non-antibiotic weeks, Disp: , Rfl:     mirtazapine (REMERON) 15 mg tablet, Take 3.2 mg by mouth nightly. Takes for pain and sleep, Disp: , Rfl:     dronabinol (MARINOL) 2.5 mg capsule, Take 2.5 mg by mouth as needed. Take 1-4 capsules by mouth three times daily. , Disp: , Rfl:     rifabutin (MYCOBUTIN) 150 mg capsule, Take 1 Cap by mouth two (2) times a day. 150mg MWF week 1 and MTW week 2, Disp: , Rfl:     gabapentin (NEURONTIN) 100 mg capsule, Take 300 mg by mouth nightly., Disp: , Rfl: 6    lorazepam (ATIVAN) 1 mg tablet, Take 1 mg by mouth nightly.  Indications: difficulty sleeping, Disp: , Rfl:    Date Last Reviewed:  11/5/2020   Number of Personal Factors/Comorbidities that affect the Plan of Care: 1-2: MODERATE COMPLEXITY   EXAMINATION:   Observation/Orthostatic Postural Assessment:          Pt has rounded shoulders and forward head lean        Pt guards L arm use to prevent pain        Slight right cervical tilt in sitting/laying  Palpation:          Increased tone on proximal wrist extensors        Superficial fascial tightness on wrist extensors distal to lateral epicondyle        TTP on superior border of lateral epicondyle near joint line        Crepitus in glenohumeral joint during shoulder flexion/external rotation        Decreased glenohumeral inferior/posterior glide  ROM:          L shoulder ROM:   FE: 160 deg (not smooth) and some pain  ER: 70 deg        L elbow ROM  WNL          Strength:          Wrist MMT:  Flexion: 5/5 B  Extension: 5/5 R; 3/5 L (with pain)  UD: 5/5 B  RD: 5/5 B (with pain on L)        Shoulder MMT:  L shoulder ER: 4/5 with slight pain, otherwise all other functions WNL         Strength Dynamometer (pounds)  R: 25 pounds  L: 5 pounds     Body Structures Involved:  1. Bones  2. Joints  3. Muscles  4. Ligaments Body Functions Affected:  1. Neuromusculoskeletal  2. Movement Related Activities and Participation Affected:  1. General Tasks and Demands  2. Self Care  3. Interpersonal Interactions and Relationships  4.  Community, Social and [place]   Number of elements (examined above) that affect the Plan of Care: 4+: HIGH COMPLEXITY   CLINICAL PRESENTATION:   Presentation: Stable and uncomplicated: LOW COMPLEXITY   CLINICAL DECISION MAKING:   Use of outcome tool(s) and clinical judgement create a POC that gives a: Clear prediction of patient's progress: LOW COMPLEXITY

## 2020-11-10 ENCOUNTER — HOSPITAL ENCOUNTER (OUTPATIENT)
Dept: PHYSICAL THERAPY | Age: 60
Discharge: HOME | End: 2020-11-10
Payer: MEDICARE

## 2020-11-10 PROCEDURE — 97140 MANUAL THERAPY 1/> REGIONS: CPT

## 2020-11-10 PROCEDURE — 97110 THERAPEUTIC EXERCISES: CPT

## 2020-11-10 NOTE — PROGRESS NOTES
Tristan Patel  : 1960  Primary: Amaya Rangel Medicare Advantage  Secondary:  [address] at [facility]  [address] [place] [address]  [phone]   [phone]      OUTPATIENT PHYSICAL THERAPY: Daily Treatment Note 11/10/2020  Visit Count:  2    ICD-10: Treatment Diagnosis:   Lateral Epicondylitis, Left M77.12  Pain in joint, Left Elbow M25.522  Precautions/Allergies:   Latex, natural rubber; Dexamethasone; and Other medication   TREATMENT PLAN:  Effective Dates: 2020 TO 2/3/2021 (90 days). Frequency/Duration: 2 times a week for 4 weeks then 1 time per week for 8 weeks Day(s) MEDICAL/REFERRING DIAGNOSIS:  Lateral epicondylitis, left elbow [M77.12]  DATE OF ONSET: 2020  REFERRING PHYSICIAN: Yee, Not On File, MD Dr. Brandon Diaz MD Orders: Evaluate and Treat  Return MD Appointment: TBD       Pre-treatment Symptoms/Complaints:  Pt reports that she has been more consistent with her forearm brace wearing and that seems to help some. It doesn't feel too irritated at the moment. Pain: Initial: Pain Intensity 1: 10 Post Session:  3/10   Medications Last Reviewed:  11/10/2020  Updated Objective Findings:  Limited mobility of 1st rib on left side. Increased tone and adhesion of subscapularis. TREATMENT:     THERAPEUTIC EXERCISE: (15 minutes):  Exercises per grid below to improve mobility, strength and coordination. Required minimal visual, verbal and manual cues to promote proper body alignment, promote proper body posture and promote proper body mechanics. Progressed resistance, range, repetitions and complexity of movement as indicated.    Date:  11/10/2020   Activity/Exercise Parameters   Wrist extensor stretch (reviewed) 5 x 30 sec   B shoulder ER 3 x 10   Scapular setting 2 x 10   Pt ed Progression of tx and HEP                 MANUAL THERAPY: (30 minutes): Joint mobilization and Soft tissue mobilization was utilized and necessary because of the patient's restricted joint motion, loss of articular motion and restricted motion of soft tissue. - superficial fascial mobilization of proximal wrist extensors immediately distal to the lateral epicondyle   - Manual stretch of the extensors into wrist flexion and radial deviation with full elbow extension   - 1st rib mobilization on Left   - soft tissue mobilization around clavicle, subscapularis, Latissimus Dorsi, wrist extensors, biceps. - posterior/inferior glenohumeral glides   - radial glides into wrist flexion and supination   - Olecranon mobilization into trochlear notch   - scapular post depression/anterior elevation with post depression hold after contract/relax  MedBridge Portal  Treatment/Session Summary:    · Response to Treatment:  Pt responded well to treatment and noted that her shoulder hadn't moved this good in a decade. Pt will progress into more therapeutic exercise strengthening as the pain symptoms subside. An improvement of shoulder girdle function will help reduce symptoms. Pt will benefit from skilled PT as we address impairments and progress towards goals. .  · Communication/Consultation:  None today  · Equipment provided today:  None today  · Recommendations/Intent for next treatment session: Next visit will focus on shoulder ROM and function.     Total Treatment Billable Duration:  45 minutes  PT Patient Time In/Time Out  Time In: 7138  Time Out: [facility] [address] PT    Future Appointments   Date Time Provider Jono Butcher   11/13/2020  8:00 AM Colby Wilson, PT [facility]   11/17/2020  1:00 PM Colby Wilson PT [facility]   11/24/2020  1:00 PM Jayleen Collier, PT [facility]   12/11/2020 11:00 AM Kathalene Cogan., MD [facility]

## 2020-11-13 ENCOUNTER — HOSPITAL ENCOUNTER (OUTPATIENT)
Dept: PHYSICAL THERAPY | Age: 60
Discharge: HOME | End: 2020-11-13
Payer: MEDICARE

## 2020-11-13 PROCEDURE — 97110 THERAPEUTIC EXERCISES: CPT

## 2020-11-13 PROCEDURE — 97140 MANUAL THERAPY 1/> REGIONS: CPT

## 2020-11-13 NOTE — PROGRESS NOTES
Marilyn Conroy Viridianabonita  : 1960  Primary: Tasha Chicas Medicare Advantage  Secondary:  [address]  at [facility]  [address] [place] [address]  [phone]   [phone]      OUTPATIENT PHYSICAL THERAPY: Daily Treatment Note 2020  Visit Count:  3    ICD-10: Treatment Diagnosis:   Lateral Epicondylitis, Left M77.12  Pain in joint, Left Elbow M25.522  Precautions/Allergies:   Latex, natural rubber; Dexamethasone; and Other medication   TREATMENT PLAN:  Effective Dates: 2020 TO 2/3/2021 (90 days). Frequency/Duration: 2 times a week for 4 weeks then 1 time per week for 8 weeks Day(s) MEDICAL/REFERRING DIAGNOSIS:  Lateral epicondylitis, left elbow [M77.12]  DATE OF ONSET: 2020  REFERRING PHYSICIAN: Yee, Not On File, MD Dr. Jean Claude Buitrago MD Orders: Evaluate and Treat  Return MD Appointment: TBD       Pre-treatment Symptoms/Complaints:  Pt reports that she notices a considerable difference in how her elbow feels since starting therapy. She is surprised that she has had a notable reduction in symptoms after dealing with this issue since March. 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 Pain: Initial: Pain Intensity 1: 310 Post Session:  2/10   Medications Last Reviewed:  2020  Updated Objective Findings:  Limited mobility of 1st rib on left side. Increased tone and adhesion of subscapularis. TREATMENT:     THERAPEUTIC EXERCISE: (15 minutes):  Exercises per grid below to improve mobility, strength and coordination. Required minimal visual, verbal and manual cues to promote proper body alignment, promote proper body posture and promote proper body mechanics. Progressed resistance, range, repetitions and complexity of movement as indicated.    Date:  2020   Activity/Exercise Parameters   Wrist extensor stretch (reviewed) 5 x 30 sec   B shoulder ER 3 x 10   Scapular setting 2 x 10   Pt ed Positioning for guitar and daily function to reduce strain   Pronation/Supination Isometrics 90 deg in forearm neutral with towel under armpit 2 x 10 x 10 sec each way             MANUAL THERAPY: (35 minutes): Joint mobilization and Soft tissue mobilization was utilized and necessary because of the patient's restricted joint motion, loss of articular motion and restricted motion of soft tissue. - superficial fascial mobilization of proximal wrist extensors immediately distal to the lateral epicondyle   - Manual stretch of the extensors into wrist flexion and radial deviation with full elbow extension   - 1st rib mobilization on Left   - soft tissue mobilization around clavicle, subscapularis, Latissimus Dorsi, wrist extensors, biceps, scalenes. - posterior/inferior glenohumeral glides   - radial glides into wrist flexion and supination   - Olecranon mobilization into trochlear notch   - scapular post depression/anterior elevation with post depression hold after contract/relax  MedBridge Portal  Treatment/Session Summary:    · Response to Treatment:  Pt symptoms are decreasing steadily and function is improving per the subjective. An improvement of shoulder girdle function will help reduce symptoms. Pt will benefit from skilled PT as we address impairments and progress towards goals. · Communication/Consultation:  None today  · Equipment provided today:  None today  · Recommendations/Intent for next treatment session: Next visit will focus on shoulder ROM and function and isometric strengthening of wrist extensors.     Total Treatment Billable Duration:  50 minutes  PT Patient Time In/Time Out  Time In: 2030  Time Out: 0900  Paul Nicolas PT    Future Appointments   Date Time Provider Jono Butcher   11/17/2020  1:00 PM Asya Lemons, FRANDY [facility]   11/24/2020  1:00 PM Asya Lemons PT [facility]   12/11/2020 11:00 AM [facility]

## 2020-11-17 ENCOUNTER — HOSPITAL ENCOUNTER (OUTPATIENT)
Dept: PHYSICAL THERAPY | Age: 60
Discharge: HOME | End: 2020-11-17
Payer: MEDICARE

## 2020-11-17 PROCEDURE — 97110 THERAPEUTIC EXERCISES: CPT

## 2020-11-17 PROCEDURE — 97140 MANUAL THERAPY 1/> REGIONS: CPT

## 2020-11-17 NOTE — PROGRESS NOTES
Juana Patel  : 1960  Primary: Pete Baird Medicare Advantage  Secondary:  [address]  at [facility]  [address] [place] [address]  [phone]   [phone]      OUTPATIENT PHYSICAL THERAPY: Daily Treatment Note 2020  Visit Count:  4    ICD-10: Treatment Diagnosis:   Lateral Epicondylitis, Left M77.12  Pain in joint, Left Elbow M25.522  Precautions/Allergies:   Latex, natural rubber; Dexamethasone; and Other medication   TREATMENT PLAN:  Effective Dates: 2020 TO 2/3/2021 (90 days). Frequency/Duration: 2 times a week for 4 weeks then 1 time per week for 8 weeks Day(s) MEDICAL/REFERRING DIAGNOSIS:  Lateral epicondylitis, left elbow [M77.12]  DATE OF ONSET: 2020  REFERRING PHYSICIAN: Yee, Not On File, MD Dr. Jaci Diaz MD Orders: Evaluate and Treat  Return MD Appointment: TBD       Pre-treatment Symptoms/Complaints:  Pt reports that she notices a considerable difference in how her elbow feels since starting therapy. She feels like the exercises last time did not create too much pain. Pain: Initial: Pain Intensity 1: 210 Post Session:  1/10   Medications Last Reviewed:  2020  Updated Objective Findings:  Limited mobility of 1st rib on left side. Increased tone and adhesion of subscapularis. TREATMENT:     THERAPEUTIC EXERCISE: (15 minutes):  Exercises per grid below to improve mobility, strength and coordination. Required minimal visual, verbal and manual cues to promote proper body alignment, promote proper body posture and promote proper body mechanics. Progressed resistance, range, repetitions and complexity of movement as indicated.    Date:  2020   Activity/Exercise Parameters   Wrist extensor stretch (reviewed) 5 x 30 sec   B shoulder ER 3 x 10   Scapular setting 2 x 10   Pt ed Positioning for guitar and daily function to reduce strain   Pronation/Supination With red band today x 15              MANUAL THERAPY: (30 minutes): Joint mobilization and Soft tissue mobilization was utilized and necessary because of the patient's restricted joint motion, loss of articular motion and restricted motion of soft tissue. - superficial fascial mobilization of proximal wrist extensors immediately distal to the lateral epicondyle   - Manual stretch of the extensors into wrist flexion and radial deviation with full elbow extension   - 1st rib mobilization on Left   - soft tissue mobilization around clavicle, subscapularis, Latissimus Dorsi, wrist extensors, biceps, scalenes. - posterior/inferior glenohumeral glides   - radial glides into wrist flexion and supination   - Olecranon mobilization into trochlear notch   - scapular post depression/anterior elevation with post depression hold after contract/relax  MedBridge Portal  Treatment/Session Summary:    · Response to Treatment:  Pt symptoms are decreasing steadily and function is improving per the subjective. She still shows restrictions in the left scapula. Worked some more radial head mobility today and will see how she felt at next visit  · Communication/Consultation:  None today  · Equipment provided today:  None today  · Recommendations/Intent for next treatment session: Next visit will focus on shoulder ROM and function and isometric strengthening of wrist extensors.     Total Treatment Billable Duration:  45 minutes  PT Patient Time In/Time Out  Time In: 1312  Time Out: [address], PT    Future Appointments   Date Time Provider Jono Butcher   11/24/2020  1:00 PM Christel Wakefield PT [facility]   12/11/2020 11:00 AM MD Enid Gallegos

## 2020-11-24 ENCOUNTER — HOSPITAL ENCOUNTER (OUTPATIENT)
Dept: PHYSICAL THERAPY | Age: 60
Discharge: HOME | End: 2020-11-24
Payer: MEDICARE

## 2020-11-24 PROCEDURE — 97140 MANUAL THERAPY 1/> REGIONS: CPT

## 2020-11-24 PROCEDURE — 97110 THERAPEUTIC EXERCISES: CPT

## 2020-11-24 NOTE — PROGRESS NOTES
Luke Patel  : 1960  Primary: Emilee Stewart Medicare Advantage  Secondary:  [address]  at [facility]  [address] [place] [address]  [phone]   [phone]      OUTPATIENT PHYSICAL THERAPY: Daily Treatment Note 2020  Visit Count:  5    ICD-10: Treatment Diagnosis:   Lateral Epicondylitis, Left M77.12  Pain in joint, Left Elbow M25.522  Precautions/Allergies:   Latex, natural rubber; Dexamethasone; and Other medication   TREATMENT PLAN:  Effective Dates: 2020 TO 2/3/2021 (90 days). Frequency/Duration: 2 times a week for 4 weeks then 1 time per week for 8 weeks Day(s) MEDICAL/REFERRING DIAGNOSIS:  Lateral epicondylitis, left elbow [M77.12]  DATE OF ONSET: 2020  REFERRING PHYSICIAN: Yee, Not On File, MD Dr. Angella Evangelista MD Orders: Evaluate and Treat  Return MD Appointment: TBD       Pre-treatment Symptoms/Complaints:  Pt reports that she played the guitar some this weekend and got the searing pain again. Pain: Initial: Pain Intensity 1: 1/10 Post Session:  1/10   Medications Last Reviewed:  2020  Updated Objective Findings:  Limited mobility of 1st rib on left side. Increased tone and adhesion of subscapularis. Limitation in wrist extensors today as well  TREATMENT:     THERAPEUTIC EXERCISE: (10 minutes):  Exercises per grid below to improve mobility, strength and coordination. Required minimal visual, verbal and manual cues to promote proper body alignment, promote proper body posture and promote proper body mechanics. Progressed resistance, range, repetitions and complexity of movement as indicated.    Date:  2020   Activity/Exercise Parameters   Wrist extensor stretch  5 x 30 sec   B shoulder ER 3 x 10   Scapular setting 2 x 10   Pt ed Positioning for guitar and daily function to reduce strain   Pronation/Supination With red band today x 15              MANUAL THERAPY: (44 minutes): Joint mobilization and Soft tissue mobilization was utilized and necessary because of the patient's restricted joint motion, loss of articular motion and restricted motion of soft tissue. - superficial fascial mobilization of proximal wrist extensors immediately distal to the lateral epicondyle   - Manual stretch of the extensors into wrist flexion and radial deviation with full elbow extension   - 1st rib mobilization on Left   - soft tissue mobilization around clavicle, subscapularis, Latissimus Dorsi, wrist extensors, biceps, scalenes. - posterior/inferior glenohumeral glides   - radial glides into wrist flexion and supination   - Olecranon mobilization into trochlear notch   - scapular post depression/anterior elevation with post depression hold after contract/relax  MedBridge Portal  Treatment/Session Summary:    · Response to Treatment:  Pt symptoms are improving with time, but she will try to not play guitar 30 min straight this time and split it up some. · Communication/Consultation:  None today  · Equipment provided today:  None today  · Recommendations/Intent for next treatment session: Next visit will focus on shoulder ROM and function and isometric strengthening of wrist extensors.     Total Treatment Billable Duration:  54 minutes  PT Patient Time In/Time Out  Time In: 1301  Time Out: 604 [address] [place] [address],     Future Appointments   Date Time Provider Jono Butcher   12/1/2020  8:00 AM Janis Gilliland, PT [facility]   12/8/2020 11:00 AM Marti Collier, PT [facility]   12/11/2020 11:00 AM Cheree Kawasaki., MD Anai Mcdonald

## 2020-12-01 ENCOUNTER — HOSPITAL ENCOUNTER (OUTPATIENT)
Dept: PHYSICAL THERAPY | Age: 60
Discharge: HOME | End: 2020-12-01
Payer: MEDICARE

## 2020-12-01 PROCEDURE — 97140 MANUAL THERAPY 1/> REGIONS: CPT

## 2020-12-01 NOTE — PROGRESS NOTES
Herve Patel  : 1960  Primary: [employer] Medicare Advantage  Secondary:  [address]  at [facility]  [address] [place] [address]  [phone]   [phone]      OUTPATIENT PHYSICAL THERAPY: Daily Treatment Note 2020  Visit Count:  6    ICD-10: Treatment Diagnosis:   Lateral Epicondylitis, Left M77.12  Pain in joint, Left Elbow M25.522  Precautions/Allergies:   Latex, natural rubber; Dexamethasone; and Other medication   TREATMENT PLAN:  Effective Dates: 2020 TO 2/3/2021 (90 days). Frequency/Duration: 2 times a week for 4 weeks then 1 time per week for 8 weeks Day(s) MEDICAL/REFERRING DIAGNOSIS:  Lateral epicondylitis, left elbow [M77.12]  DATE OF ONSET: 2020  REFERRING PHYSICIAN: Yee, Not On File, MD Dr. Rey Amin MD Orders: Evaluate and Treat  Return MD Appointment: TBD       Pre-treatment Symptoms/Complaints:  Pt reports that she is a little better than last time and was able to play the guitar some without searing pain  Pain: Initial: Pain Intensity 1: 210 Post Session:  1/10   Medications Last Reviewed:  2020  Updated Objective Findings:  Limited mobility of 1st rib on left side. Increased tone and adhesion of subscapularis. Limitation in wrist extensors today as well  TREATMENT:     THERAPEUTIC EXERCISE: (reviewed minutes):  Exercises per grid below to improve mobility, strength and coordination. Required minimal visual, verbal and manual cues to promote proper body alignment, promote proper body posture and promote proper body mechanics. Progressed resistance, range, repetitions and complexity of movement as indicated.    Date:  2020   Activity/Exercise Parameters   Wrist extensor stretch  5 x 30 sec   B shoulder ER 3 x 10   Scapular setting 2 x 10   Pt ed Positioning for guitar and daily function to reduce strain   Pronation/Supination With red band today x 15              MANUAL THERAPY: (55 minutes): Joint mobilization and Soft tissue mobilization was utilized and necessary because of the patient's restricted joint motion, loss of articular motion and restricted motion of soft tissue. - superficial fascial mobilization of proximal wrist extensors immediately distal to the lateral epicondyle   - Manual stretch of the extensors into wrist flexion and radial deviation with full elbow extension   - 1st rib mobilization on Left   - soft tissue mobilization around clavicle, subscapularis, Latissimus Dorsi, wrist extensors, biceps, scalenes. - posterior/inferior glenohumeral glides   - radial glides into wrist flexion and supination   - Olecranon mobilization into trochlear notch   - scapular post depression/anterior elevation with post depression hold after contract/relax  MedBridge Portal  Treatment/Session Summary:    · Response to Treatment:  Pt symptoms are improving with time. She shows some improvement in radial mobility and scapular positioning  · Communication/Consultation:  None today  · Equipment provided today:  None today  · Recommendations/Intent for next treatment session: Next visit will focus on shoulder ROM and function and isometric strengthening of wrist extensors.     Total Treatment Billable Duration:  55 minutes  PT Patient Time In/Time Out  Time In: 0805  Time Out: 0900  Joseph Puentes PT    Future Appointments   Date Time Provider Jono Butcher   12/8/2020 11:00 AM Ana Cristina Hughes PT [facility]   12/11/2020 11:00 AM MD Anel Solitario

## 2020-12-08 ENCOUNTER — HOSPITAL ENCOUNTER (OUTPATIENT)
Dept: PHYSICAL THERAPY | Age: 60
Discharge: HOME | End: 2020-12-08
Payer: MEDICARE

## 2020-12-08 PROCEDURE — 97110 THERAPEUTIC EXERCISES: CPT

## 2020-12-08 PROCEDURE — 97140 MANUAL THERAPY 1/> REGIONS: CPT

## 2020-12-08 NOTE — PROGRESS NOTES
Pastor Luis Amanda  : 1960  Primary: Angie Mario Medicare Advantage  Secondary:  [address] at [facility]  [address] [place] [address]  [phone]   [phone]      OUTPATIENT PHYSICAL THERAPY: Daily Treatment Note 2020  Visit Count:  7    ICD-10: Treatment Diagnosis:   Lateral Epicondylitis, Left M77.12  Pain in joint, Left Elbow M25.522  Precautions/Allergies:   Latex, natural rubber; Dexamethasone; and Other medication   TREATMENT PLAN:  Effective Dates: 2020 TO 2/3/2021 (90 days). Frequency/Duration: 2 times a week for 4 weeks then 1 time per week for 8 weeks Day(s) MEDICAL/REFERRING DIAGNOSIS:  Lateral epicondylitis, left elbow [M77.12]  DATE OF ONSET: 2020  REFERRING PHYSICIAN: Yee, Not On File, MD Dr. Saw Montes MD Orders: Evaluate and Treat  Return MD Appointment: TBD       Pre-treatment Symptoms/Complaints:  Pt reports that she is continuing to play a little more at a time and feels better with the shoulder position  Pain: Initial: Pain Intensity 1: 1/10 Post Session:  1/10   Medications Last Reviewed:  2020  Updated Objective Findings:  Limited mobility of 1st rib on left side. Increased tone and adhesion of subscapularis. Limitation in wrist extensors today as well  TREATMENT:     THERAPEUTIC EXERCISE: (10minutes):  Exercises per grid below to improve mobility, strength and coordination. Required minimal visual, verbal and manual cues to promote proper body alignment, promote proper body posture and promote proper body mechanics. Progressed resistance, range, repetitions and complexity of movement as indicated.    Date:  2020   Activity/Exercise Parameters   Wrist extensor stretch  5 x 30 sec   B shoulder ER 3 x 10   Scapular setting 2 x 10   Pt ed Positioning for guitar and daily function to reduce strain   Pronation/Supination With red band today x 15    Prone on elbows Scapular stabilization x 5 min         MANUAL THERAPY: (45 minutes): Joint mobilization and Soft tissue mobilization was utilized and necessary because of the patient's restricted joint motion, loss of articular motion and restricted motion of soft tissue. - superficial fascial mobilization of proximal wrist extensors immediately distal to the lateral epicondyle   - Manual stretch of the extensors into wrist flexion and radial deviation with full elbow extension   - 1st rib mobilization on Left   - soft tissue mobilization around clavicle, subscapularis, Latissimus Dorsi, wrist extensors, biceps, scalenes. - posterior/inferior glenohumeral glides   - radial glides into wrist flexion and supination   - Olecranon mobilization into trochlear notch   - scapular post depression/anterior elevation with post depression hold after contract/relax  MedBridge Portal  Treatment/Session Summary:    · Response to Treatment:  Pt symptoms are improving with time. Continue to work shoulder stability  · Communication/Consultation:  None today  · Equipment provided today:  None today  · Recommendations/Intent for next treatment session: Next visit will focus on shoulder ROM and function and isometric strengthening of wrist extensors.     Total Treatment Billable Duration:  55 minutes  PT Patient Time In/Time Out  Time In: 1105  Time Out: [facility] [address] PT    Future Appointments   Date Time Provider Jono Butcher   12/11/2020 11:00 AM Maliha Duran MD [id number]   12/21/2020  9:00 AM Jace Collier, PT [facility]

## 2020-12-11 PROBLEM — R00.2 PALPITATIONS: Status: ACTIVE | Noted: 2020-12-11

## 2020-12-11 PROBLEM — Z00.00 MEDICARE ANNUAL WELLNESS VISIT, SUBSEQUENT: Status: ACTIVE | Noted: 2020-12-11

## 2020-12-13 PROBLEM — M77.12 LEFT TENNIS ELBOW: Status: ACTIVE | Noted: 2020-12-13

## 2020-12-13 PROBLEM — F51.01 PRIMARY INSOMNIA: Status: ACTIVE | Noted: 2020-12-13

## 2020-12-13 PROBLEM — R00.0 TACHYCARDIA: Status: ACTIVE | Noted: 2020-12-13

## 2020-12-13 PROBLEM — M79.10 MYALGIA: Status: ACTIVE | Noted: 2020-12-13

## 2020-12-18 PROBLEM — N39.0 URINARY TRACT INFECTION WITHOUT HEMATURIA: Status: ACTIVE | Noted: 2020-12-18

## 2020-12-18 PROBLEM — R30.0 BURNING WITH URINATION: Status: ACTIVE | Noted: 2020-12-18

## 2020-12-21 ENCOUNTER — HOSPITAL ENCOUNTER (OUTPATIENT)
Dept: PHYSICAL THERAPY | Age: 60
Discharge: HOME | End: 2020-12-21
Payer: MEDICARE

## 2020-12-21 PROCEDURE — 97140 MANUAL THERAPY 1/> REGIONS: CPT

## 2020-12-21 NOTE — PROGRESS NOTES
Venecia Patel  : 1960  Primary: Yazmin Spears Medicare Advantage  Secondary:  [address]  at [facility]  [address] [place] [address]  [phone]   [phone]      OUTPATIENT PHYSICAL THERAPY: Daily Treatment Note 2020  Visit Count:  8    ICD-10: Treatment Diagnosis:   Lateral Epicondylitis, Left M77.12  Pain in joint, Left Elbow M25.522  Precautions/Allergies:   Latex, natural rubber; Dexamethasone; and Other medication   TREATMENT PLAN:  Effective Dates: 2020 TO 2/3/2021 (90 days). Frequency/Duration: 2 times a week for 4 weeks then 1 time per week for 8 weeks Day(s) MEDICAL/REFERRING DIAGNOSIS:  Lateral epicondylitis, left elbow [M77.12]  DATE OF ONSET: 2020  REFERRING PHYSICIAN:  Dr. Daphne Garcia MD Orders: Evaluate and Treat  Return MD Appointment: TBD       Pre-treatment Symptoms/Complaints:  Pt reports that she is continuing to play a little more at a time and feels better with the shoulder position. She has a little wrist tightness lately and the guitar seems to be the main thing that bothers the elbow  Pain: Initial: Pain Intensity 1: 1/10 Post Session:  1/10   Medications Last Reviewed:  2020  Updated Objective Findings:  Limited mobility of 1st rib on left side. Increased tone and adhesion of subscapularis. Limitation in wrist extensors today as well  TREATMENT:     THERAPEUTIC EXERCISE: ((reviewed) minutes):  Exercises per grid below to improve mobility, strength and coordination. Required minimal visual, verbal and manual cues to promote proper body alignment, promote proper body posture and promote proper body mechanics. Progressed resistance, range, repetitions and complexity of movement as indicated.    Date:  2020   Activity/Exercise Parameters   Wrist extensor stretch  5 x 30 sec   B shoulder ER 3 x 10   Scapular setting 2 x 10   Pt ed Positioning for guitar and daily function to reduce strain Pronation/Supination With red band today x 15    Prone on elbows Scapular stabilization x 5 min         MANUAL THERAPY: (45 minutes): Joint mobilization and Soft tissue mobilization was utilized and necessary because of the patient's restricted joint motion, loss of articular motion and restricted motion of soft tissue. - superficial fascial mobilization of proximal wrist extensors immediately distal to the lateral epicondyle   - Manual stretch of the extensors into wrist flexion and radial deviation with full elbow extension   - 1st rib mobilization on Left   - soft tissue mobilization around clavicle, subscapularis, Latissimus Dorsi, wrist extensors, biceps, scalenes. - posterior/inferior glenohumeral glides   - radial glides into wrist flexion and supination   - Olecranon mobilization into trochlear notch   - scapular post depression/anterior elevation with post depression hold after contract/relax  MedBridge Portal  Treatment/Session Summary:    · Response to Treatment:  Pt symptoms are improving with time. Less radial head restrictions and shoulder tightness today  · Communication/Consultation:  None today  · Equipment provided today:  None today  · Recommendations/Intent for next treatment session: Next visit will focus on shoulder ROM and function and isometric strengthening of wrist extensors.     Total Treatment Billable Duration:  45 minutes  PT Patient Time In/Time Out  Time In: 5232  Time Out: FRANDY Ortez    Future Appointments   Date Time Provider Jono Butcher   1/4/2021 11:00 AM Don Pinto PT [facility]   1/21/2021  2:00 PM Alexus Zapien.MD Jordi

## 2021-01-04 ENCOUNTER — HOSPITAL ENCOUNTER (OUTPATIENT)
Dept: PHYSICAL THERAPY | Age: 61
Discharge: HOME | End: 2021-01-04
Payer: MEDICARE

## 2021-01-04 PROCEDURE — 97140 MANUAL THERAPY 1/> REGIONS: CPT

## 2021-01-04 PROCEDURE — 97110 THERAPEUTIC EXERCISES: CPT

## 2021-01-04 NOTE — PROGRESS NOTES
Andre Patel  : 1960  Primary: [employer] Medicare Advantage  Secondary:  [address]  at [facility]  [address] [place] [address]  [phone]   [phone]      OUTPATIENT PHYSICAL THERAPY: Daily Treatment Note 2021  Visit Count:  9    ICD-10: Treatment Diagnosis:   Lateral Epicondylitis, Left M77.12  Pain in joint, Left Elbow M25.522  Precautions/Allergies:   Latex, natural rubber; Dexamethasone; and Other medication   TREATMENT PLAN:  Effective Dates: 2020 TO 2/3/2021 (90 days). Frequency/Duration: 2 times a week for 4 weeks then 1 time per week for 8 weeks Day(s) MEDICAL/REFERRING DIAGNOSIS:  Lateral epicondylitis, left elbow [M77.12]  DATE OF ONSET: 2020  REFERRING PHYSICIAN:  Dr. Neftali Moreno MD Orders: Evaluate and Treat  Return MD Appointment: TBD       Pre-treatment Symptoms/Complaints:  Pt reports that she is continuing to have a little more pain in the elbow, but is still trying to perform her yoga and guitar. Pain: Initial: Pain Intensity 1: 210 Post Session:  1/10   Medications Last Reviewed:  2021  Updated Objective Findings:  Limited mobility of 1st rib on left side. Increased tone and adhesion of subscapularis. Limitation in wrist extensors today as well  TREATMENT:     THERAPEUTIC EXERCISE: (10 minutes):  Exercises per grid below to improve mobility, strength and coordination. Required minimal visual, verbal and manual cues to promote proper body alignment, promote proper body posture and promote proper body mechanics. Progressed resistance, range, repetitions and complexity of movement as indicated.    Date:  2021   Activity/Exercise Parameters   Wrist extensor stretch  5 x 30 sec   B shoulder ER 3 x 10   Scapular setting 2 x 10   Pt ed Positioning for guitar and daily function to reduce strain   Pronation/Supination With red band today x 15    Prone on elbows Scapular stabilization x 5 min         MANUAL THERAPY: (45 minutes): Joint mobilization and Soft tissue mobilization was utilized and necessary because of the patient's restricted joint motion, loss of articular motion and restricted motion of soft tissue. - superficial fascial mobilization of proximal wrist extensors immediately distal to the lateral epicondyle   - Manual stretch of the extensors into wrist flexion and radial deviation with full elbow extension   - 1st rib mobilization on Left   - soft tissue mobilization around clavicle, subscapularis, Latissimus Dorsi, wrist extensors, biceps, scalenes. - posterior/inferior glenohumeral glides   - radial glides into wrist flexion and supination   - Olecranon mobilization into trochlear notch   - scapular post depression/anterior elevation with post depression hold after contract/relax  MedBridge Portal  Treatment/Session Summary:    · Response to Treatment:  Pt symptoms are improving with time. Continue to work triceps release and ER mobility  · Communication/Consultation:  None today  · Equipment provided today:  None today  · Recommendations/Intent for next treatment session: Next visit will focus on shoulder ROM and function and isometric strengthening of wrist extensors.     Total Treatment Billable Duration:  55 minutes  PT Patient Time In/Time Out  Time In: 1405  Time Out: [address] [facility], PT    Future Appointments   Date Time Provider Jono Butcher   1/18/2021  1:00 PM Louetta Bosworth, FRANDY [facility]   1/21/2021  2:00 PM MD Blair Victoria

## 2021-01-18 ENCOUNTER — HOSPITAL ENCOUNTER (OUTPATIENT)
Dept: PHYSICAL THERAPY | Age: 61
Discharge: HOME | End: 2021-01-18
Payer: MEDICARE

## 2021-01-18 PROCEDURE — 97110 THERAPEUTIC EXERCISES: CPT

## 2021-01-18 PROCEDURE — 97140 MANUAL THERAPY 1/> REGIONS: CPT

## 2021-01-18 NOTE — THERAPY RECERTIFICATION
Zehra Paetl  : 1960  Primary: Noel [place]obo Medicare Advantage  Secondary:  [address] [place]  at [facility]  [address] [place] [address]  [phone]   [phone]         OUTPATIENT PHYSICAL THERAPY:Recertification    ICD-10: Treatment Diagnosis:   Lateral Epicondylitis, Left M77.12  Pain in joint, Left Elbow M25.522  Precautions/Allergies:   Latex, natural rubber; Dexamethasone; and Other medication   TREATMENT PLAN:  Effective Dates: 2021 TO 21(90 days). Frequency/Duration: 1 time every other week for 12 weeks MEDICAL/REFERRING DIAGNOSIS:  Lateral epicondylitis, left elbow [M77.12]  DATE OF ONSET: 2020  REFERRING PHYSICIAN:Dr. Jan Patterson MD Orders: Evaluate and Treat  Return MD Appointment: JERI   Felipa Daley has been seen for 10 visits from 20 to 2021 for left arm and elbow pain. Patient has performed therapeutic exercises, activities, and had manual therapy to increased strength, ROM and function. Patient has also used modalities for pain control in order to increase function. Patient has show an increase in function per the DASH with scores of 30/55. She reports less searing pain and less tenderness overall, but it is still not going away. Patient has progressed well toward their goals and will benefit from continuing skilled PT in order to address their impairments. Ronald Butler, PT, DPT, OCS, CFMT          INITIAL ASSESSMENT:  Ms. Pedro Pablo Solitario presents to the clinic with pain, strength, and functional deficits in the left elbow with signs and symptoms consistent with lateral epicondylitis. Pt shoulder ROM and functional impairments play a role in the elbow compensating to continue performing daily function and activities. Pt has marked weakness between left and right  strength that could be caused by a radial nerve compression at the elbow. Pt has Lyme's disease which can affect treatment prognosis.  Pt needs skilled PT to help address impairments and improve function as we progress towards goals. Ronald Butler, PT, DPT, OCS, CFMT     PROBLEM LIST (Impacting functional limitations):  1. Decreased Strength  2. Decreased ADL/Functional Activities  3. Increased Pain  4. Decreased Activity Tolerance  5. Decreased Flexibility/Joint Mobility INTERVENTIONS PLANNED: (Treatment may consist of any combination of the following)  1. Cryotherapy  2. Electrical Stimulation  3. Home Exercise Program (HEP)  4. Manual Therapy  5. Neuromuscular Re-education/Strengthening  6. Range of Motion (ROM)  7. Therapeutic Activites  8. Therapeutic Exercise/Strengthening     GOALS: (Goals have been discussed and agreed upon with patient.)  Short-Term Functional Goals: Time Frame: 4 weeks  1. Pt will demonstrate ability to perform initial HEP to help progress treatment (MET)  2. Pt will report 4/10 pain at worst to help improve quality of life (MET)  3. Pt will demonstrate an improvement in hand strength by 5 pounds in the left hand to show returning of strength in the L upper extremity (ongoing)  Discharge Goals: Time Frame: 12 weeks  1. Pt will demonstrate independence with final HEP (ongoing)  2. Pt will demonstrate ability to  a bottle with affect UE without pain to assist with ADLs (ongoing)  3. Pt will report ability to play guitar without pain in the lateral epicondyle (ongoing with improvement)  4. Pt will report ability to perform yoga routines without interruption from forearm pain (ongoing with improvement)  5. Pt will improve DASH by 10 points to improve daily function(ongoing)    OUTCOME MEASURE:   Tool Used: Disabilities of the Arm, Shoulder and Hand (DASH) Questionnaire - Quick Version  Score:  Initial: 39/55  Most Recent: 30/55 (Date: -- )   Interpretation of Score: The DASH is designed to measure the activities of daily living in person's with upper extremity dysfunction or pain.   Each section is scored on a 1-5 scale, 5 representing the greatest disability. The scores of each section are added together for a total score of 55. MEDICAL NECESSITY:   · Patient is expected to demonstrate progress in strength, range of motion, coordination and functional technique to improve daily function and recreational tasks. · Patient demonstrates good rehab potential due to higher previous functional level. REASON FOR SERVICES/OTHER COMMENTS:  · Patient requires therapy to improve on limitations listed. Total Duration:  PT Patient Time In/Time Out  Time In: 1305  Time Out: 1400    Rehabilitation Potential For Stated Goals: Good  Regarding [employer] therapy, I certify that the treatment plan above will be carried out by a therapist or under their direction. Thank you for this referral,  West Sanchez, PT     Referring Physician Signature: Dr. Milla Chino _______________________________ Date _____________     PAIN/SUBJECTIVE:   Initial: Pain Intensity 1: 2 at worst Post Session:  3/10   HISTORY:   History of Injury/Illness (Reason for Referral):  Pt arrived to the clinic with pain in the left elbow. Pt states that she has been dealing with this pain since March of 2020, but did not know if it was just a result of her lyme's disease. Pt states that during the pandemic, her activity in yoga and playing the guitar increased considerably, which could have flared the condition. Pt reports that she is in a considerable amount of pain when she plays guitar or when performing closed chain yoga exercises. Pt states that she is R hand dominant, but sleeps on her L side which can increase symptoms. Pt also states that she has fallen on her L shoulder in the past. Pt wants to return to OF without pain.   Past Medical History/Comorbidities:   Ms. Yury Miller  has a past medical history of Anemia, Arthritis, Cholecystitis (8/28/2014), Chronic pain, Glaucoma, History of skin cancer, Multiple thyroid nodules, Obstructive sleep apnea, Osteoarthritis of lumbar spine, Osteopenia, and Tennis elbow. She also has no past medical history of Adverse effect of anesthesia, Aneurysm ([facility] [address] Asthma, Autoimmune disease ([address] CAD (coronary artery disease), Chronic kidney disease, Chronic obstructive pulmonary disease ([address] Coagulation disorder ([address] Diabetes ([facility] [address] Difficult intubation, Endocarditis, GERD (gastroesophageal reflux disease), Heart failure ([address] Hypertension, Liver disease, Malignant hyperthermia due to anesthesia, Morbid obesity ([facility] [address] Nicotine vapor product user, Non-nicotine vapor product user, Pseudocholinesterase deficiency, Psychiatric disorder, PUD (peptic ulcer disease), Rheumatic fever, Seizures ([facility] [address] Stroke ([facility] [address] or Thromboembolus ([facility] [address] Ms. Angelia Benson  has a past surgical history that includes hx wisdom teeth extraction; hx appendectomy (age 9); hx rhinoplasty (1990); hx colonoscopy; hx cholecystectomy (2014); hx vascular access; hx maxillofacial; hx septoplasty (02/01/2018); and hx cataract removal (Bilateral, 12/2019).   Social History/Living Environment:       Social History     Socioeconomic History    Marital status: SINGLE     Spouse name: Not on file    Number of children: Not on file    Years of education: Not on file    Highest education level: Not on file   Occupational History    Occupation: not currently employed   Social Needs    Financial resource strain: Not on file    Food insecurity     Worry: Not on file     Inability: Not on file   [employer] needs     Medical: Not on file     Non-medical: Not on file   Tobacco Use    Smoking status: Never Smoker    Smokeless tobacco: Never Used   Substance and Sexual Activity    Alcohol use: No     Alcohol/week: 0.0 standard drinks    Drug use: No     Comment: none    Sexual activity: Not on file   Lifestyle    Physical activity     Days per week: Not on file     Minutes per session: Not on file    Stress: Not on file   Relationships    Social connections Talks on phone: Not on file     Gets together: Not on file     Attends Bahai service: Not on file     Active member of club or organization: Not on file     Attends meetings of clubs or organizations: Not on file     Relationship status: Not on file    Intimate partner violence     Fear of current or ex partner: Not on file     Emotionally abused: Not on file     Physically abused: Not on file     Forced sexual activity: Not on file   Other Topics Concern    Not on file   Social History Narrative    Has a Master's degree    Lives alone       Prior Level of Function/Work/Activity:  Independent with activity without pain  Out of work   Dominant Side:         RIGHT   Ambulatory/Rehab Services H2 Model Falls Risk Assessment   Risk Factors:       No Risk Factors Identified Ability to Rise from Chair:       (0)  Ability to rise in a single movement   Falls Prevention Plan:       No modifications necessary   Total: (5 or greater = High Risk): 0   ©2010 [facility] [place] [address] States Patent #[id number]. Federal Law prohibits the replication, distribution or use without written permission from [facility] [place]   Current Medications:       Current Outpatient Medications:     ciprofloxacin HCl (CIPRO) 500 mg tablet, Take 1 Tab by mouth two (2) times a day., Disp: 10 Tab, Rfl: 0    hydrOXYzine pamoate (VISTARIL) 25 mg capsule, Take 12.5 mg by mouth nightly., Disp: , Rfl:     ondansetron (ZOFRAN ODT) 8 mg disintegrating tablet, 1 tablet Q 8 hours PRN N/V, Disp: 8 Tab, Rfl: 0    timolol (TIMOPTIC) 0.5 % ophthalmic solution, INSTILL 1 DROP IN BOTH EYES DAILY; use day of surgery, Disp: , Rfl: 2    cholecalciferol, vitamin D3, (VITAMIN D3) 2,000 unit tab, Take  by mouth., Disp: , Rfl:     multivitamin (ONE A DAY) tablet, Take 1 Tab by mouth. Take 1 tab twice weekly, Disp: , Rfl:     rifaximin (XIFAXAN) 200 mg tablet, Take 550 mg by mouth.  Takes  M-F on non-antibiotic weeks, Disp: , Rfl:   mirtazapine (REMERON) 15 mg tablet, Take 3.2 mg by mouth nightly. Takes for pain and sleep, Disp: , Rfl:     dronabinol (MARINOL) 2.5 mg capsule, Take 2.5 mg by mouth as needed. Take 1-4 capsules by mouth three times daily. , Disp: , Rfl:     rifabutin (MYCOBUTIN) 150 mg capsule, Take 1 Cap by mouth two (2) times a day. 150mg MWF week 1 and MTW week 2, Disp: , Rfl:     gabapentin (NEURONTIN) 100 mg capsule, Take 300 mg by mouth nightly., Disp: , Rfl: 6    lorazepam (ATIVAN) 1 mg tablet, Take 1 mg by mouth nightly. Indications: difficulty sleeping, Disp: , Rfl:    Date Last Reviewed:  1/18/2021   Number of Personal Factors/Comorbidities that affect the Plan of Care: 1-2: MODERATE COMPLEXITY   EXAMINATION:   Observation/Orthostatic Postural Assessment:          Pt has rounded shoulders and forward head lean        Pt guards L arm use to prevent pain        Slight right cervical tilt in sitting/laying  Palpation:          Increased tone on proximal wrist extensors        Superficial fascial tightness on wrist extensors distal to lateral epicondyle        TTP on superior border of lateral epicondyle near joint line        Crepitus in glenohumeral joint during shoulder flexion/external rotation        Decreased glenohumeral inferior/posterior glide  ROM:          L shoulder ROM:   FE: 160 deg (not smooth) and some pain  ER: 70 deg        L elbow ROM          Strength:          Wrist MMT:  Flexion: 5/5 B  Extension: 5/5 R; 3/5 L (with pain)  UD: 5/5 B  RD: 5/5 B (with pain on L)        Shoulder MMT:  L shoulder ER: 4/5 with slight pain, otherwise all other functions WNL         Strength Dynamometer (pounds)  R: 25 pounds  L: 5 pounds     Body Structures Involved:  1. Bones  2. Joints  3. Muscles  4. Ligaments Body Functions Affected:  1. Neuromusculoskeletal  2. Movement Related Activities and Participation Affected:  1. General Tasks and Demands  2. Self Care  3.  Interpersonal Interactions and Relationships  4.  Community, Social and [place]   Number of elements (examined above) that affect the Plan of Care: 4+: HIGH COMPLEXITY   CLINICAL PRESENTATION:   Presentation: Stable and uncomplicated: LOW COMPLEXITY   CLINICAL DECISION MAKING:   Use of outcome tool(s) and clinical judgement create a POC that gives a: Clear prediction of patient's progress: LOW COMPLEXITY

## 2021-01-18 NOTE — PROGRESS NOTES
Anna Patel  : 1960  Primary: [place] [address] Medicare Advantage  Secondary:  [address]  at [facility]  [address] [facility], Dianelys Calloway, [address] [place] [address]  [phone]   [phone]      OUTPATIENT PHYSICAL THERAPY: Daily Treatment Note 2021  Visit Count:  10    ICD-10: Treatment Diagnosis:   Lateral Epicondylitis, Left M77.12  Pain in joint, Left Elbow M25.522  Precautions/Allergies:   Latex, natural rubber; Dexamethasone; and Other medication   TREATMENT PLAN:  Effective Dates: 2021 TO 21(90 days). Frequency/Duration: 1 time every other week for 12 weeks MEDICAL/REFERRING DIAGNOSIS:  Lateral epicondylitis, left elbow [M77.12]  DATE OF ONSET: 2020  REFERRING PHYSICIAN:  Dr. Milla Cesar MD Orders: Evaluate and Treat  Return MD Appointment: TBD       Pre-treatment Symptoms/Complaints:  Pt reports that she is controlling the pain better, but it still aches with certain movements and with putting her jacket on. Pain: Initial: Pain Intensity 1: 210 Post Session:  1/10   Medications Last Reviewed:  2021  Updated Objective Findings:  Limited mobility of 1st rib on left side. Increased tone and adhesion of subscapularis. Limitation in wrist extensors today as well  TREATMENT:     THERAPEUTIC EXERCISE: (10 minutes):  Exercises per grid below to improve mobility, strength and coordination. Required minimal visual, verbal and manual cues to promote proper body alignment, promote proper body posture and promote proper body mechanics. Progressed resistance, range, repetitions and complexity of movement as indicated.    Date:  2021   Activity/Exercise Parameters   Wrist extensor stretch  5 x 30 sec   B shoulder ER 3 x 10   Scapular setting 2 x 10   Pt ed Positioning for guitar and daily function to reduce strain   Pronation/Supination With red band today x 15    Prone on elbows Scapular stabilization x 5 min   Sleeper stretch Left side lie 5 x 10 sec hold with self contract relax     MANUAL THERAPY: (45 minutes): Joint mobilization and Soft tissue mobilization was utilized and necessary because of the patient's restricted joint motion, loss of articular motion and restricted motion of soft tissue. - superficial fascial mobilization of proximal wrist extensors immediately distal to the lateral epicondyle   - Manual stretch of the extensors into wrist flexion and radial deviation with full elbow extension   - 1st rib mobilization on Left   - soft tissue mobilization around clavicle, subscapularis, Latissimus Dorsi, wrist extensors, biceps, scalenes. - posterior/inferior glenohumeral glides   - radial glides into wrist flexion and supination   - Olecranon mobilization into trochlear notch   - scapular post depression/anterior elevation with post depression hold after contract/relax  Med[employer] Portal  Treatment/Session Summary:    · Response to Treatment:  Pt symptoms are improving with time. She still has some restrictions around brachioradialis as well as brachialis. Follow up in two weeks  · Communication/Consultation:  None today  · Equipment provided today:  None today  · Recommendations/Intent for next treatment session: Next visit will focus on shoulder ROM and function and isometric strengthening of wrist extensors.     Total Treatment Billable Duration:  55 minutes  PT Patient Time In/Time Out  Time In: 1305  Time Out: [address], PT    Future Appointments   Date Time Provider Jono Butcher   1/21/2021  2:00 PM Buck Brock MD [id number] [place]   2/1/2021  2:00 PM Nik Wall, PT [facility]

## 2021-01-21 PROBLEM — E55.9 VITAMIN D DEFICIENCY: Status: ACTIVE | Noted: 2021-01-21

## 2021-02-01 ENCOUNTER — HOSPITAL ENCOUNTER (OUTPATIENT)
Dept: PHYSICAL THERAPY | Age: 61
Discharge: HOME | End: 2021-02-01
Payer: MEDICARE

## 2021-02-01 PROCEDURE — 97140 MANUAL THERAPY 1/> REGIONS: CPT

## 2021-02-01 PROCEDURE — 97035 APP MDLTY 1+ULTRASOUND EA 15: CPT

## 2021-02-01 NOTE — PROGRESS NOTES
Antonieta Patel  : 1960  Primary: [place] Medicare Advantage  Secondary:  [address]  at [facility]  [address] [place] [address]  [phone]   [phone]      OUTPATIENT PHYSICAL THERAPY: Daily Treatment Note 2021  Visit Count:  11    ICD-10: Treatment Diagnosis:   Lateral Epicondylitis, Left M77.12  Pain in joint, Left Elbow M25.522  Precautions/Allergies:   Latex, natural rubber; Dexamethasone; and Other medication   TREATMENT PLAN:  Effective Dates: 2021 TO 21(90 days). Frequency/Duration: 1 time every other week for 12 weeks MEDICAL/REFERRING DIAGNOSIS:  Lateral epicondylitis, left elbow [M77.12]  DATE OF ONSET: 2020  REFERRING PHYSICIAN:  Dr. Aidee Scott MD Orders: Evaluate and Treat  Return MD Appointment: TBD       Pre-treatment Symptoms/Complaints:  Pt reports that she is controlling the pain better and played more guitar since last time. Pain: Initial: Pain Intensity 1: 210 Post Session:  1/10   Medications Last Reviewed:  2021  Updated Objective Findings:  Limited mobility of 1st rib on left side. Increased tone and adhesion of subscapularis. Limitation in wrist extensors today as well  TREATMENT:     THERAPEUTIC EXERCISE: (reviewed) minutes):  Exercises per grid below to improve mobility, strength and coordination. Required minimal visual, verbal and manual cues to promote proper body alignment, promote proper body posture and promote proper body mechanics. Progressed resistance, range, repetitions and complexity of movement as indicated.    Date:  2021   Activity/Exercise Parameters   Wrist extensor stretch  5 x 30 sec   B shoulder ER 3 x 10   Scapular setting 2 x 10   Pt ed Positioning for guitar and daily function to reduce strain   Pronation/Supination With red band today x 15    Prone on elbows Scapular stabilization x 5 min   Sleeper stretch Left side lie 5 x 10 sec hold with self contract relax     MANUAL THERAPY: (35 minutes): Joint mobilization and Soft tissue mobilization was utilized and necessary because of the patient's restricted joint motion, loss of articular motion and restricted motion of soft tissue. - superficial fascial mobilization of proximal wrist extensors immediately distal to the lateral epicondyle   - Manual stretch of the extensors into wrist flexion and radial deviation with full elbow extension   - 1st rib mobilization on Left   - soft tissue mobilization around clavicle, subscapularis, Latissimus Dorsi, wrist extensors, biceps, scalenes. - posterior/inferior glenohumeral glides   - radial glides into wrist flexion and supination   - Olecranon mobilization into trochlear notch   - scapular post depression/anterior elevation with post depression hold after contract/relax    Modalities:   Ultrasound (10 min): continuous at 1 mHz for 8 min to left epicondyle. Treatment/Session Summary:    · Response to Treatment:  Pt symptoms are improving with time. She still has some restrictions around brachioradialis as well as brachialis. We will see how patient feels after ultrasound for next visit   · Communication/Consultation:  None today  · Equipment provided today:  None today  · Recommendations/Intent for next treatment session: Next visit will focus on shoulder ROM and function and isometric strengthening of wrist extensors.     Total Treatment Billable Duration:  45 minutes  PT Patient Time In/Time Out  Time In: 1400  Time Out: [address], PT    Future Appointments   Date Time Provider Jono Butcher   2/15/2021  2:00 PM Auther Brunner, FRANDY [facility]   7/21/2021  1:00 PM MD Jose Marr

## 2021-02-15 ENCOUNTER — HOSPITAL ENCOUNTER (OUTPATIENT)
Dept: PHYSICAL THERAPY | Age: 61
Discharge: HOME | End: 2021-02-15
Payer: MEDICARE

## 2021-02-15 PROCEDURE — 97140 MANUAL THERAPY 1/> REGIONS: CPT

## 2021-02-15 PROCEDURE — 97035 APP MDLTY 1+ULTRASOUND EA 15: CPT

## 2021-02-15 NOTE — PROGRESS NOTES
Pedro Patel  : 1960  Primary: [place] Medicare Advantage  Secondary:  [address]  at [facility]  [address] [place] [address]  [phone]   [phone]      OUTPATIENT PHYSICAL THERAPY: Daily Treatment Note 2/15/2021  Visit Count:  12    ICD-10: Treatment Diagnosis:   Lateral Epicondylitis, Left M77.12  Pain in joint, Left Elbow M25.522  Precautions/Allergies:   Latex, natural rubber; Dexamethasone; and Other medication   TREATMENT PLAN:  Effective Dates: 2021 TO 21(90 days). Frequency/Duration: 1 time every other week for 12 weeks MEDICAL/REFERRING DIAGNOSIS:  Lateral epicondylitis, left elbow [M77.12]  DATE OF ONSET: 2020  REFERRING PHYSICIAN:  Dr. Aguila Lewis MD Orders: Evaluate and Treat  Return MD Appointment: TBD       Pre-treatment Symptoms/Complaints:  Pt reports that she is controlling the pain better with less pain since last time. She is wondering if the pain comes more after her treatments for her [place]  Pain: Initial: Pain Intensity 1: 1/10 Post Session:  1/10   Medications Last Reviewed:  2/15/2021  Updated Objective Findings:  Limited mobility of 1st rib on left side. Increased tone and adhesion of subscapularis. Limitation in wrist extensors today as well  TREATMENT:     THERAPEUTIC EXERCISE: (reviewed) minutes):  Exercises per grid below to improve mobility, strength and coordination. Required minimal visual, verbal and manual cues to promote proper body alignment, promote proper body posture and promote proper body mechanics. Progressed resistance, range, repetitions and complexity of movement as indicated.    Date:  2/15/2021   Activity/Exercise Parameters   Wrist extensor stretch  5 x 30 sec   B shoulder ER 3 x 10   Scapular setting 2 x 10   Pt ed Positioning for guitar and daily function to reduce strain   Pronation/Supination With red band today x 15    Prone on elbows Scapular stabilization x 5 min   Sleeper stretch Left side lie 5 x 10 sec hold with self contract relax     MANUAL THERAPY: (35 minutes): Joint mobilization and Soft tissue mobilization was utilized and necessary because of the patient's restricted joint motion, loss of articular motion and restricted motion of soft tissue. - superficial fascial mobilization of proximal wrist extensors immediately distal to the lateral epicondyle   - Manual stretch of the extensors into wrist flexion and radial deviation with full elbow extension   - 1st rib mobilization on Left   - soft tissue mobilization around clavicle, subscapularis, Latissimus Dorsi, wrist extensors, biceps, scalenes. - posterior/inferior glenohumeral glides   - radial glides into wrist flexion and supination   - Olecranon mobilization into trochlear notch   - scapular post depression/anterior elevation with post depression hold after contract/relax    Modalities:   Ultrasound (10 min): continuous at 1 mHz for 8 min to left epicondyle. Treatment/Session Summary:    · Response to Treatment:  Pt symptoms are improving with time. We will try to push out treatments out to three weeks this time. · Communication/Consultation:  None today  · Equipment provided today:  None today  · Recommendations/Intent for next treatment session: Next visit will focus on shoulder ROM and function and isometric strengthening of wrist extensors.     Total Treatment Billable Duration:  45 minutes  PT Patient Time In/Time Out  Time In: [address]  Time Out: 217 Aminata Gomez PT    Future Appointments   Date Time Provider Jono Butcher   3/8/2021  1:00 PM Nik Wall PT [facility]   7/21/2021  1:00 PM MD Maynor Gruber

## 2021-03-09 ENCOUNTER — HOSPITAL ENCOUNTER (OUTPATIENT)
Dept: PHYSICAL THERAPY | Age: 61
Discharge: HOME | End: 2021-03-09
Payer: MEDICARE

## 2021-03-09 PROCEDURE — 97140 MANUAL THERAPY 1/> REGIONS: CPT

## 2021-03-09 PROCEDURE — 97035 APP MDLTY 1+ULTRASOUND EA 15: CPT

## 2021-03-09 NOTE — PROGRESS NOTES
Steve Patel  : 1960  Primary: Rashaun Araujo Medicare Advantage  Secondary:  [address]  at [facility]  [address] [place] [address]  [phone]   [phone]      OUTPATIENT PHYSICAL THERAPY: Daily Treatment Note 3/9/2021  Visit Count:  13    ICD-10: Treatment Diagnosis:   Lateral Epicondylitis, Left M77.12  Pain in joint, Left Elbow M25.522  Precautions/Allergies:   Latex, natural rubber; Dexamethasone; and Other medication   TREATMENT PLAN:  Effective Dates: 2021 TO 21(90 days). Frequency/Duration: 1 time every other week for 12 weeks MEDICAL/REFERRING DIAGNOSIS:  Lateral epicondylitis, left elbow [M77.12]  DATE OF ONSET: 2020  REFERRING PHYSICIAN:  Dr. Brett Martinez MD Orders: Evaluate and Treat  Return MD Appointment: TBD       Pre-treatment Symptoms/Complaints:  Pt reports that she is doing well for the most part with only small localized pain spot. Pain: Initial: Pain Intensity 1: 1/10 Post Session:  1/10   Medications Last Reviewed:  3/9/2021  Updated Objective Findings:  Limited mobility of 1st rib on left side. Increased tone and adhesion of subscapularis. TREATMENT:     THERAPEUTIC EXERCISE: (reviewed) minutes):  Exercises per grid below to improve mobility, strength and coordination. Required minimal visual, verbal and manual cues to promote proper body alignment, promote proper body posture and promote proper body mechanics. Progressed resistance, range, repetitions and complexity of movement as indicated.    Date:  3/9/2021   Activity/Exercise Parameters   Wrist extensor stretch  5 x 30 sec   B shoulder ER 3 x 10   Scapular setting 2 x 10   Pt ed Positioning for guitar and daily function to reduce strain   Pronation/Supination With red band today x 15    Prone on elbows Scapular stabilization x 5 min   Sleeper stretch Left side lie 5 x 10 sec hold with self contract relax     MANUAL THERAPY: (45 minutes): Joint mobilization and Soft tissue mobilization was utilized and necessary because of the patient's restricted joint motion, loss of articular motion and restricted motion of soft tissue. - superficial fascial mobilization of proximal wrist extensors immediately distal to the lateral epicondyle   - Manual stretch of the extensors into wrist flexion and radial deviation with full elbow extension   - 1st rib mobilization on Left   - soft tissue mobilization around clavicle, subscapularis, Latissimus Dorsi, wrist extensors, biceps, scalenes. - posterior/inferior glenohumeral glides   - radial glides into wrist flexion and supination   - Olecranon mobilization into trochlear notch   - scapular post depression/anterior elevation with post depression hold after contract/relax    Modalities:   Ultrasound (10 min): continuous at 1 mHz for 8 min to left epicondyle. Treatment/Session Summary:    · Response to Treatment:  Pt symptoms are improving with time and mobilizations. Follow up in a month  · Communication/Consultation:  None today  · Equipment provided today:  None today  · Recommendations/Intent for next treatment session: Next visit will focus on shoulder ROM and function and isometric strengthening of wrist extensors.     Total Treatment Billable Duration:  55 minutes  PT Patient Time In/Time Out  Time In: 1102  Time Out: [address] [facility], PT    Future Appointments   Date Time Provider Jono Butcher   4/6/2021 11:00 AM Brandan Lagos PT [facility]   7/21/2021  1:00 PM MD Triston Mercado

## 2021-04-06 ENCOUNTER — HOSPITAL ENCOUNTER (OUTPATIENT)
Dept: PHYSICAL THERAPY | Age: 61
Discharge: HOME | End: 2021-04-06
Payer: MEDICARE

## 2021-04-06 PROCEDURE — 97035 APP MDLTY 1+ULTRASOUND EA 15: CPT

## 2021-04-06 PROCEDURE — 97110 THERAPEUTIC EXERCISES: CPT

## 2021-04-06 PROCEDURE — 97140 MANUAL THERAPY 1/> REGIONS: CPT

## 2021-04-06 NOTE — THERAPY RECERTIFICATION
Jaden Puckett : 1960 Primary: [employer] Medicare Advantage Secondary:  [facility] 
[address] [place] [address] [phone]   [phone] OUTPATIENT PHYSICAL THERAPY:Recertification [id number] ICD-10: Treatment Diagnosis:  
Lateral Epicondylitis, Left M77.12 Pain in joint, Left Elbow M25.522 Pain in joint, left shoulder, M25.512 Precautions/Allergies:  
Latex, natural rubber; Dexamethasone; and Other medication TREATMENT PLAN: 
Effective Dates: 2021 TO 21(90 days). Frequency/Duration: 1 time every 3 weeks for 12 weeks MEDICAL/REFERRING DIAGNOSIS: 
Lateral epicondylitis, left elbow [M77.12] DATE OF ONSET: 2020 REFERRING PHYSICIAN:Dr. Karoline Hernandez MD Orders: Evaluate and Treat Return MD Appointment: TBD Jaden Puckett has been seen for 14 visits from 20 to 2021 for left arm and elbow pain. Patient has performed therapeutic exercises, activities, and had manual therapy to increased strength, ROM and function. Patient has also used modalities for pain control in order to increase function. Patient has shown an increase in function per the DASH with scores of 27/55. She reports less searing pain and less tenderness overall. She is also reporting more issues with her left shoulder. We will start more treatment at the shoulder due to the connection between that pain and her elbow pain. Patient has progressed well toward their goals and will benefit from continuing skilled PT in order to address their impairments. Alejandro Tafoya, PT, DPT, OCS, CFMT INITIAL ASSESSMENT:  Ms. Simeon Cook presents to the clinic with pain, strength, and functional deficits in the left elbow with signs and symptoms consistent with lateral epicondylitis. Pt shoulder ROM and functional impairments play a role in the elbow compensating to continue performing daily function and activities.  Pt has marked weakness between left and right  strength that could be caused by a radial nerve compression at the elbow. Pt has Lyme's disease which can affect treatment prognosis. Pt needs skilled PT to help address impairments and improve function as we progress towards goals. Grace Mccartney, PT, DPT, OCS, CFMT PROBLEM LIST (Impacting functional limitations): 1. Decreased Strength 2. Decreased ADL/Functional Activities 3. Increased Pain 4. Decreased Activity Tolerance 5. Decreased Flexibility/Joint Mobility INTERVENTIONS PLANNED: (Treatment may consist of any combination of the following) 1. Cryotherapy 2. Electrical Stimulation 3. Home Exercise Program (HEP) 4. Manual Therapy 5. Neuromuscular Re-education/Strengthening 6. Range of Motion (ROM) 7. Therapeutic Activites 8. Therapeutic Exercise/Strengthening GOALS: (Goals have been discussed and agreed upon with patient.) Short-Term Functional Goals: Time Frame: 4 weeks 1. Pt will demonstrate ability to perform initial HEP to help progress treatment (MET) 2. Pt will report 4/10 pain at worst to help improve quality of life (MET) 3. Pt will demonstrate an improvement in hand strength by 5 pounds in the left hand to show returning of strength in the L upper extremity (ongoing) Discharge Goals: Time Frame: 12 weeks 1. Pt will demonstrate independence with final HEP (ongoing) 2. Pt will demonstrate ability to  a bottle with affect UE without pain to assist with ADLs (ongoing) 3. Pt will report ability to play guitar without pain in the lateral epicondyle (ongoing with improvement) 4. Pt will report ability to perform yoga routines without interruption from forearm pain (ongoing with improvement) 5. Pt will improve DASH by 10 points to improve daily function(ongoing) OUTCOME MEASURE:  
Tool Used: Disabilities of the Arm, Shoulder and Hand (DASH) Questionnaire - Quick Version Score:  Initial: 39/55  Most Recent: 27/55 (Date: 4/6/21) Interpretation of Score: The DASH is designed to measure the activities of daily living in person's with upper extremity dysfunction or pain. Each section is scored on a 1-5 scale, 5 representing the greatest disability. The scores of each section are added together for a total score of 55. MEDICAL NECESSITY:  
· Patient is expected to demonstrate progress in strength, range of motion, coordination and functional technique to improve daily function and recreational tasks. · Patient demonstrates good rehab potential due to higher previous functional level. REASON FOR SERVICES/OTHER COMMENTS: 
· Patient requires therapy to improve on limitations listed. Total Duration: PT Patient Time In/Time Out Time In: 3649 Time Out: 2409 Rehabilitation Potential For Stated Goals: Good Regarding Shanna Franzradha therapy, I certify that the treatment plan above will be carried out by a therapist or under their direction. Thank you for this referral, Annita Hernandes, PT Referring Physician Signature: Dr. Louis Galdamez _______________________________ Date _____________ PAIN/SUBJECTIVE:  
Initial: Pain Intensity 1: 2 at worst Post Session:  3/10 HISTORY:  
History of Injury/Illness (Reason for Referral): 
Pt arrived to the clinic with pain in the left elbow. Pt states that she has been dealing with this pain since March of 2020, but did not know if it was just a result of her lyme's disease. Pt states that during the pandemic, her activity in yoga and playing the guitar increased considerably, which could have flared the condition. Pt reports that she is in a considerable amount of pain when she plays guitar or when performing closed chain yoga exercises. Pt states that she is R hand dominant, but sleeps on her L side which can increase symptoms. Pt also states that she has fallen on her L shoulder in the past. Pt wants to return to PLOF without pain. Past Medical History/Comorbidities: Ms. Jennifer Abdul  has a past medical history of Anemia, Arthritis, Cholecystitis (8/28/2014), Chronic pain, Glaucoma, History of skin cancer, Multiple thyroid nodules, Obstructive sleep apnea, Osteoarthritis of lumbar spine, Osteopenia, and Tennis elbow. She also has no past medical history of Adverse effect of anesthesia, Aneurysm ([address] Asthma, Autoimmune disease ([address] CAD (coronary artery disease), Chronic kidney disease, Chronic obstructive pulmonary disease ([address] Coagulation disorder ([address] Diabetes ([address] Difficult intubation, Endocarditis, GERD (gastroesophageal reflux disease), Heart failure ([address] Hypertension, Liver disease, Malignant hyperthermia due to anesthesia, Morbid obesity ([address] Nicotine vapor product user, Non-nicotine vapor product user, Pseudocholinesterase deficiency, Psychiatric disorder, PUD (peptic ulcer disease), Rheumatic fever, Seizures ([address] Stroke ([address] or Thromboembolus ([address] Ms. Jose Luis Ferraro  has a past surgical history that includes hx wisdom teeth extraction; hx appendectomy (age 9); hx rhinoplasty (1990); hx colonoscopy; hx cholecystectomy (2014); hx vascular access; hx maxillofacial; hx septoplasty (02/01/2018); and hx cataract removal (Bilateral, 12/2019). Social History/Living Environment:  
   
Social History Socioeconomic History  Marital status: SINGLE Spouse name: Not on file  Number of children: Not on file  Years of education: Not on file  Highest education level: Not on file Occupational History  Occupation: not currently employed Social Needs  Financial resource strain: Not on file  Food insecurity Worry: Not on file Inability: Not on file  Transportation needs Medical: Not on file Non-medical: Not on file Tobacco Use  Smoking status: Never Smoker  Smokeless tobacco: Never Used Substance and Sexual Activity  Alcohol use: No  
  Alcohol/week: 0.0 standard drinks  Drug use: No  
  Comment: none  Sexual activity: Not on file Lifestyle  Physical activity Days per week: Not on file Minutes per session: Not on file  Stress: Not on file Relationships  Social connections Talks on phone: Not on file Gets together: Not on file Attends Jewish service: Not on file Active member of club or organization: Not on file Attends meetings of clubs or organizations: Not on file Relationship status: Not on file  Intimate partner violence Fear of current or ex partner: Not on file Emotionally abused: Not on file Physically abused: Not on file Forced sexual activity: Not on file Other Topics Concern  Not on file Social History Narrative Has a Master's degree Lives alone Prior Level of Function/Work/Activity: 
Independent with activity without pain Out of work Dominant Side:  
      [facility] H2 Model Falls Risk Assessment Risk Factors: 
     No Risk Factors Identified Ability to Rise from Chair: 
     (0)  Ability to rise in a single movement Falls Prevention Plan: No modifications necessary Total: (5 or greater = High Risk): 0  
©2010 [facility] [address] [place] [address] States Patent #[id number]. Federal Law prohibits the replication, distribution or use without written permission from [facility] Current Medications:   
  
Current Outpatient Medications:  
  tretinoin (RETIN-A) 0.05 % topical cream, APPLY THINLY TO FACE EVERY EVENING AS TOLERATED, Disp: , Rfl:  
  hydrOXYzine HCL (ATARAX) 50 mg tablet, TAKE 1/2 TO 1 TABLET BY MOUTH 30 MINUTES PRIOR TO SLEEP, Disp: , Rfl:  
  hydroquinone (ESOTERICA, MELQUIN) 4 % topical cream, APPLY TO DARK SPOTS 2 TIMES A DAY FOR 3 MONTHS, Disp: , Rfl:  
  estradioL (ESTRACE) 0.01 % (0.1 mg/gram) vaginal cream, Apply pea-sized amount (0.5 g) nightly x 2 weeks, then twice weekly, Disp: , Rfl:  
  hydrOXYzine pamoate (VISTARIL) 25 mg capsule, Take 12.5 mg by mouth nightly., Disp: , Rfl:  
  timolol (TIMOPTIC) 0.5 % ophthalmic solution, INSTILL 1 DROP IN BOTH EYES DAILY; use day of surgery, Disp: , Rfl: 2   cholecalciferol, vitamin D3, (VITAMIN D3) 2,000 unit tab, Take  by mouth., Disp: , Rfl:  
  multivitamin (ONE A DAY) tablet, Take 1 Tab by mouth. Take 1 tab twice weekly, Disp: , Rfl:  
  mirtazapine (REMERON) 15 mg tablet, Take 3.2 mg by mouth nightly. Takes for pain and sleep, Disp: , Rfl:  
  rifabutin (MYCOBUTIN) 150 mg capsule, Take 1 Cap by mouth two (2) times a day. 150mg MWF week 1 and MTW week 2, Disp: , Rfl:  
  gabapentin (NEURONTIN) 100 mg capsule, Take 300 mg by mouth nightly., Disp: , Rfl: 6 
  lorazepam (ATIVAN) 1 mg tablet, Take 0.25 mg by mouth nightly. Indications: difficulty sleeping, Disp: , Rfl:   
Date Last Reviewed:  4/6/2021 Number of Personal Factors/Comorbidities that affect the Plan of Care: 1-2: MODERATE COMPLEXITY EXAMINATION:  
Observation/Orthostatic Postural Assessment:   
      Pt has rounded shoulders and forward head lean Pt guards L arm use to prevent pain Slight right cervical tilt in sitting/laying Palpation:   
      Increased tone on proximal wrist extensors Superficial fascial tightness on wrist extensors distal to lateral epicondyle TTP on superior border of lateral epicondyle near joint line Crepitus in glenohumeral joint during shoulder flexion/external rotation Decreased glenohumeral inferior/posterior glide ROM:   
      L shoulder ROM:  
FE: 160 deg (not smooth) and some pain ER: 70 deg L elbow ROM Strength:   
      Wrist MMT: 
Flexion: 5/5 B Extension: 5/5 R; 3/5 L (with pain) UD: 5/5 B 
RD: 5/5 B (with pain on L) Shoulder MMT: 
L shoulder ER: 4/5 with slight pain, otherwise all other functions WNL  Strength Dynamometer (pounds) R: 25 pounds L: 5 pounds Body Structures Involved: 1. Bones 2. Joints 3. Muscles 4.  Ligaments Body Functions Affected: 1. Neuromusculoskeletal 
2. Movement Related Activities and Participation Affected: 1. General Tasks and Demands 2. Self Care 3. Interpersonal Interactions and Relationships 4. Community, Social and [place] Number of elements (examined above) that affect the Plan of Care: 4+: HIGH COMPLEXITY CLINICAL PRESENTATION:  
Presentation: Stable and uncomplicated: LOW COMPLEXITY CLINICAL DECISION MAKING:  
Use of outcome tool(s) and clinical judgement create a POC that gives a: Clear prediction of patient's progress: LOW COMPLEXITY

## 2021-04-06 NOTE — PROGRESS NOTES
Jaden Patel  : 1960  Primary: Jose Youssef Medicare Advantage  Secondary:  Violeta Santos at [facility]  [address] [place] [address]  [phone]   [phone]      OUTPATIENT PHYSICAL THERAPY: Daily Treatment Note 2021  Visit Count:  14    ICD-10: Treatment Diagnosis:   Lateral Epicondylitis, Left M77.12  Pain in joint, Left Elbow M25.522  Precautions/Allergies:   Latex, natural rubber; Dexamethasone; and Other medication   TREATMENT PLAN:  Effective Dates: 2021 TO 21(90 days). Frequency/Duration: 1 time every 3 weeks for 12 weeks MEDICAL/REFERRING DIAGNOSIS:  Lateral epicondylitis, left elbow [M77.12]  DATE OF ONSET: 2020  REFERRING PHYSICIAN:  Dr. Jose May MD Orders: Evaluate and Treat  Return MD Appointment: TBD       Pre-treatment Symptoms/Complaints:  Pt reports that she is doing well for the most part, but the pain still comes and the shoulder seems worse lately  Pain: Initial: Pain Intensity 1: 210 Post Session:  1/10   Medications Last Reviewed:  2021  Updated Objective Findings:  Limited mobility of 1st rib on left side. Increased tone and adhesion of subscapularis. TREATMENT:     THERAPEUTIC EXERCISE: (10minutes):  Exercises per grid below to improve mobility, strength and coordination. Required minimal visual, verbal and manual cues to promote proper body alignment, promote proper body posture and promote proper body mechanics. Progressed resistance, range, repetitions and complexity of movement as indicated.    Date:  2021   Activity/Exercise Parameters   Wrist extensor stretch  5 x 30 sec   B shoulder ER 3 x 10   Scapular setting 2 x 10   Pt ed Scapular position   Pronation/Supination (not today) With red band today x 15    Prone on elbows (not today) Scapular stabilization x 5 min   Sleeper stretch Left side lie 5 x 10 sec hold with self contract relax   First rib mobilization With strap x 5 min     MANUAL THERAPY: (35 minutes): Joint mobilization and Soft tissue mobilization was utilized and necessary because of the patient's restricted joint motion, loss of articular motion and restricted motion of soft tissue. - superficial fascial mobilization of proximal wrist extensors immediately distal to the lateral epicondyle   - Manual stretch of the extensors into wrist flexion and radial deviation with full elbow extension   - 1st rib mobilization on Left   - soft tissue mobilization around clavicle, subscapularis, Latissimus Dorsi, wrist extensors, biceps, scalenes. - posterior/inferior glenohumeral glides   - radial glides into wrist flexion and supination   - Olecranon mobilization into trochlear notch   - scapular post depression/anterior elevation with post depression hold after contract/relax    Modalities:   Ultrasound (10 min): continuous at 1 mHz for 8 min to left epicondyle. Treatment/Session Summary:    · Response to Treatment:  Pt symptoms are improving with time and mobilizations. Continue to follow one time every three weeks to start more work on shoulder position  · Communication/Consultation:  None today  · Equipment provided today:  None today  · Recommendations/Intent for next treatment session: Next visit will focus on shoulder ROM and function and isometric strengthening of wrist extensors.     Total Treatment Billable Duration:  55 minutes  PT Patient Time In/Time Out  Time In: 5272  Time Out: [address] [facility] [address], PT    Future Appointments   Date Time Provider Jono Butcher   4/27/2021 11:00 AM Shauna German PT [facility]   7/21/2021  1:00 PM MD Moni Long

## 2021-04-27 ENCOUNTER — HOSPITAL ENCOUNTER (OUTPATIENT)
Dept: PHYSICAL THERAPY | Age: 61
Discharge: HOME | End: 2021-04-27
Payer: MEDICARE

## 2021-04-27 PROCEDURE — 97140 MANUAL THERAPY 1/> REGIONS: CPT

## 2021-04-27 NOTE — PROGRESS NOTES
[place] David Patel  : 1960  Primary: Adrianna Mccoy Medicare Advantage  Secondary:  [address]  at [facility]  [address] [place] [address]  [phone]   [phone]      OUTPATIENT PHYSICAL THERAPY: Daily Treatment Note 2021  Visit Count:  15    ICD-10: Treatment Diagnosis:   Lateral Epicondylitis, Left M77.12  Pain in joint, Left Elbow M25.522  Precautions/Allergies:   Latex, natural rubber; Dexamethasone; and Other medication   TREATMENT PLAN:  Effective Dates: 2021 TO 21(90 days). Frequency/Duration: 1 time every 3 weeks for 12 weeks MEDICAL/REFERRING DIAGNOSIS:  Lateral epicondylitis, left elbow [M77.12]  DATE OF ONSET: 2020  REFERRING PHYSICIAN:  Dr. Lawrence Steinberg MD Orders: Evaluate and Treat  Return MD Appointment: TBD       Pre-treatment Symptoms/Complaints:  Pt reports that she is doing well for the most part. She started to do some online workouts and felt more soreness with griping activities  Pain: Initial: Pain Intensity 1: 210 Post Session:  1/10   Medications Last Reviewed:  2021  Updated Objective Findings:  Limited mobility of 1st rib on left side. Increased tone and adhesion of subscapularis. TREATMENT:     THERAPEUTIC EXERCISE: (0 minutes):  Exercises per grid below to improve mobility, strength and coordination. Required minimal visual, verbal and manual cues to promote proper body alignment, promote proper body posture and promote proper body mechanics. Progressed resistance, range, repetitions and complexity of movement as indicated.    Date:  2021   Activity/Exercise Parameters   Wrist extensor stretch  5 x 30 sec   B shoulder ER 3 x 10   Scapular setting 2 x 10   Pt ed Scapular position   Pronation/Supination (not today) With red band today x 15    Prone on elbows (not today) Scapular stabilization x 5 min   Sleeper stretch Left side lie 5 x 10 sec hold with self contract relax   First rib mobilization With strap x 5 min     MANUAL THERAPY: (45 minutes): Joint mobilization and Soft tissue mobilization was utilized and necessary because of the patient's restricted joint motion, loss of articular motion and restricted motion of soft tissue. - superficial fascial mobilization of proximal wrist extensors immediately distal to the lateral epicondyle   - Manual stretch of the extensors into wrist flexion and radial deviation with full elbow extension   - 1st rib mobilization on Left   - soft tissue mobilization around clavicle, subscapularis, Latissimus Dorsi, wrist extensors, biceps, scalenes. - posterior/inferior glenohumeral glides   - radial glides into wrist flexion and supination   - Olecranon mobilization into trochlear notch   - scapular post depression/anterior elevation with post depression hold after contract/relax    Modalities:   Ultrasound (10 min): continuous at 1 mHz for 8 min to left epicondyle. Treatment/Session Summary:    · Response to Treatment:  Pt symptoms are improving with time and mobilizations. She has started more exercises with weights and other types and is having some increased soreness, but starting more exercises without too much discomfort  · Communication/Consultation:  None today  · Equipment provided today:  None today  · Recommendations/Intent for next treatment session: Next visit will focus on shoulder ROM and function and isometric strengthening of wrist extensors.     Total Treatment Billable Duration:  45 minutes  PT Patient Time In/Time Out  Time In: 1115  Time Out: [facility] [address] PT    Future Appointments   Date Time Provider Jono Butcher   5/18/2021 11:00 AM Twyla Morris, FR[facility]   7/21/2021  1:00 PM Matty Fleming., MD [place]

## 2021-05-18 ENCOUNTER — HOSPITAL ENCOUNTER (OUTPATIENT)
Dept: PHYSICAL THERAPY | Age: 61
Discharge: HOME | End: 2021-05-18
Payer: MEDICARE

## 2021-05-18 PROCEDURE — 97140 MANUAL THERAPY 1/> REGIONS: CPT

## 2021-05-18 NOTE — PROGRESS NOTES
Idalmis Patel  : 1960  Primary: Kirti Gallardo Medicare Advantage  Secondary:  [address]  at [facility]  [address] [place] [address]  [phone]   [phone]      OUTPATIENT PHYSICAL THERAPY: Daily Treatment Note 2021  Visit Count:  16    ICD-10: Treatment Diagnosis:   Lateral Epicondylitis, Left M77.12  Pain in joint, Left Elbow M25.522  Precautions/Allergies:   Latex, natural rubber; Dexamethasone; and Other medication   TREATMENT PLAN:  Effective Dates: 2021 TO 21(90 days). Frequency/Duration: 1 time every 3 weeks for 12 weeks MEDICAL/REFERRING DIAGNOSIS:  Lateral epicondylitis, left elbow [M77.12]  DATE OF ONSET: 2020  REFERRING PHYSICIAN:  Dr. Ronald Carranza MD Orders: Evaluate and Treat  Return MD Appointment: TBD       Pre-treatment Symptoms/Complaints:  Pt reports that she is doing well for the most part. She is still having some pain with working out and with yoga, but is now able to do them some  Pain: Initial: Pain Intensity 1: 210 Post Session:  1/10   Medications Last Reviewed:  2021  Updated Objective Findings:  Limited mobility of 1st rib on left side. Increased tone and adhesion of subscapularis. TREATMENT:     THERAPEUTIC EXERCISE: (0 minutes):  Exercises per grid below to improve mobility, strength and coordination. Required minimal visual, verbal and manual cues to promote proper body alignment, promote proper body posture and promote proper body mechanics. Progressed resistance, range, repetitions and complexity of movement as indicated.    Date:  2021   Activity/Exercise Parameters   Wrist extensor stretch  5 x 30 sec   B shoulder ER 3 x 10   Scapular setting 2 x 10   Pt ed Scapular position   Pronation/Supination (not today) With red band today x 15    Prone on elbows (not today) Scapular stabilization x 5 min   Sleeper stretch Left side lie 5 x 10 sec hold with self contract relax   First rib mobilization With strap x 5 min     MANUAL THERAPY: (55 minutes): Joint mobilization and Soft tissue mobilization was utilized and necessary because of the patient's restricted joint motion, loss of articular motion and restricted motion of soft tissue. - superficial fascial mobilization of proximal wrist extensors immediately distal to the lateral epicondyle   - Manual stretch of the extensors into wrist flexion and radial deviation with full elbow extension   - 1st rib mobilization on Left   - soft tissue mobilization around clavicle, subscapularis, Latissimus Dorsi, wrist extensors, biceps, scalenes. - posterior/inferior glenohumeral glides   - radial glides into wrist flexion and supination   - Olecranon mobilization into trochlear notch   - scapular post depression/anterior elevation with post depression hold after contract/relax    Modalities:   Ultrasound (0 min): continuous at 1 mHz for 8 min to left epicondyle. Treatment/Session Summary:    · Response to Treatment:  Pt symptoms are improving with time and mobilizations. L UE scapular and shoulder girdle position is improving with training  · Communication/Consultation:  None today  · Equipment provided today:  None today  · Recommendations/Intent for next treatment session: Next visit will focus on shoulder ROM and function and isometric strengthening of wrist extensors.     Total Treatment Billable Duration:  55 minutes  PT Patient Time In/Time Out  Time In: 1105  Time Out: [address] PT    Future Appointments   Date Time Provider Jono Butcher   6/8/2021  2:00 PM Kassy Strauss PT [facility]   7/21/2021  1:00 PM MD Tasha Camara

## 2021-05-20 NOTE — PROGRESS NOTES
I am accessing Ms. Patel's chart as a part of our department's internal chart auditing process. I certify that Ms. Manish Solis is, or was, a patient in our department.   Thank you,  Cristiana Price, PTA  5/20/2021

## 2021-06-08 ENCOUNTER — HOSPITAL ENCOUNTER (OUTPATIENT)
Dept: PHYSICAL THERAPY | Age: 61
Discharge: HOME | End: 2021-06-08
Payer: MEDICARE

## 2021-06-08 PROCEDURE — 97140 MANUAL THERAPY 1/> REGIONS: CPT

## 2021-06-08 NOTE — PROGRESS NOTES
Inessa Patel  : 1960  Primary: Marian Steele Medicare Advantage  Secondary:  [address]  at [facility]  [address] [place] [address]  [phone]   [phone]      OUTPATIENT PHYSICAL THERAPY: Daily Treatment Note 2021  Visit Count:  17    ICD-10: Treatment Diagnosis:   Lateral Epicondylitis, Left M77.12  Pain in joint, Left Elbow M25.522  Precautions/Allergies:   Latex, natural rubber; Dexamethasone; and Other medication   TREATMENT PLAN:  Effective Dates: 2021 TO 21(90 days). Frequency/Duration: 1 time every 3 weeks for 12 weeks MEDICAL/REFERRING DIAGNOSIS:  Lateral epicondylitis, left elbow [M77.12]  DATE OF ONSET: 2020  REFERRING PHYSICIAN:  Dr. Markus Obrien MD Orders: Evaluate and Treat  Return MD Appointment: TBD       Pre-treatment Symptoms/Complaints:  Pt reports that she is doing well for the most part. She feels like it is much better and under control, but she has not gotten back to full guitar playing right now. She wants to try that over the next month and then see where she is at. Pain: Initial: Pain Intensity 1: 1/10 Post Session:  1/10   Medications Last Reviewed:  2021  Updated Objective Findings:  Some suboccipital tightness right greater than left today. Left shoulder girdle restricted  TREATMENT:     THERAPEUTIC EXERCISE: (0 minutes):  Exercises per grid below to improve mobility, strength and coordination. Required minimal visual, verbal and manual cues to promote proper body alignment, promote proper body posture and promote proper body mechanics. Progressed resistance, range, repetitions and complexity of movement as indicated.    Date:  2021   Activity/Exercise Parameters   Wrist extensor stretch  5 x 30 sec   B shoulder ER 3 x 10   Scapular setting 2 x 10   Pt ed Scapular position   Pronation/Supination (not today) With red band today x 15    Prone on elbows (not today) Scapular stabilization x 5 min Sleeper stretch Left side lie 5 x 10 sec hold with self contract relax   First rib mobilization With strap x 5 min     MANUAL THERAPY: (50 minutes): Joint mobilization and Soft tissue mobilization was utilized and necessary because of the patient's restricted joint motion, loss of articular motion and restricted motion of soft tissue. - superficial fascial mobilization of proximal wrist extensors immediately distal to the lateral epicondyle   - Manual stretch of the extensors into wrist flexion and radial deviation with full elbow extension   - 1st rib mobilization on Left  -Suboccipital release right with axial elongation   - soft tissue mobilization around clavicle, subscapularis, Latissimus Dorsi, wrist extensors, biceps, scalenes. - posterior/inferior glenohumeral glides   - radial glides into wrist flexion and supination   - Olecranon mobilization into trochlear notch   - scapular post depression/anterior elevation with post depression hold after contract/relax    Modalities:   Ultrasound (0 min): continuous at 1 mHz for 8 min to left epicondyle. Treatment/Session Summary:    · Response to Treatment:  Pt symptoms are improving with time and mobilizations. L UE scapular and shoulder girdle position is improving with training. She will follow up in a month  · Communication/Consultation:  None today  · Equipment provided today:  None today  · Recommendations/Intent for next treatment session: Next visit will focus on shoulder ROM and function and isometric strengthening of wrist extensors.     Total Treatment Billable Duration:  50 minutes  PT Patient Time In/Time Out  Time In: [address] [place] [address]  Time Out: [address], PT    Future Appointments   Date Time Provider Jono Guadalupe   7/6/2021 11:00 AM Nelly Hammond PT [facility]   7/21/2021  1:00 PM MD Randee Baugh

## 2021-06-10 NOTE — PROGRESS NOTES
I am accessing Ms. Patel's chart as a part of our department's internal chart auditing process. I certify that Ms. Adelfo Arango is, or was, a patient in our department.   Thank you,  Amanda Eldridge, PT, DPT  6/10/2021

## 2021-07-06 ENCOUNTER — HOSPITAL ENCOUNTER (OUTPATIENT)
Dept: PHYSICAL THERAPY | Age: 61
Discharge: HOME | End: 2021-07-06
Payer: MEDICARE

## 2021-07-06 PROCEDURE — 97140 MANUAL THERAPY 1/> REGIONS: CPT

## 2021-07-06 PROCEDURE — 97035 APP MDLTY 1+ULTRASOUND EA 15: CPT

## 2021-07-06 NOTE — THERAPY RECERTIFICATION
Deborah Patel  : 1960  Primary: Jaquelin Sprain Medicare Advantage  Secondary:  [address]  at [address]  [phone]   [phone]         OUTPATIENT PHYSICAL THERAPY:Recertification [id number]   ICD-10: Treatment Diagnosis:   Lateral Epicondylitis, Left M77.12  Pain in joint, Left Elbow M25.522  Pain in joint, left shoulder, M25.512  Precautions/Allergies:   Latex, natural rubber; Dexamethasone; and Other medication   TREATMENT PLAN:  Effective Dates: 2021 TO 10/6/21). Frequency/Duration: 1 time every month for 3 months MEDICAL/REFERRING DIAGNOSIS:  Lateral epicondylitis, left elbow [M77.12]  DATE OF ONSET: 2020  REFERRING PHYSICIAN:Dr. Judge Felipa PELAEZ Orders: Evaluate and Treat  Return MD Appointment: JERI Rollinsdonald Macias has been seen for 18 visits from 20 to 2021 for left arm and elbow pain. Patient has performed therapeutic exercises, activities, and had manual therapy to increased strength, ROM and function. Patient has also used modalities for pain control in order to increase function. Patient has shown a decrease in function per the DASH with scores of 34/55 due to recent exacerbations of her pain the last several weeks. She reports less searing pain and less tenderness overall, but has the increased achy feeling. She is also reporting more issues with her left shoulder. Patient has progressed well toward their goals and will benefit from continuing skilled PT in order to address their impairments. Arthea Mt, PT, DPT, OCS, CFMT          INITIAL ASSESSMENT:  Ms. Alessandro Peguero presents to the clinic with pain, strength, and functional deficits in the left elbow with signs and symptoms consistent with lateral epicondylitis. Pt shoulder ROM and functional impairments play a role in the elbow compensating to continue performing daily function and activities.  Pt has marked weakness between left and right  strength that could be caused by a radial nerve compression at the elbow. Pt has Lyme's disease which can affect treatment prognosis. Pt needs skilled PT to help address impairments and improve function as we progress towards goals. Neena Patel, PT, DPT, OCS, CFMT     PROBLEM LIST (Impacting functional limitations):  1. Decreased Strength  2. Decreased ADL/Functional Activities  3. Increased Pain  4. Decreased Activity Tolerance  5. Decreased Flexibility/Joint Mobility INTERVENTIONS PLANNED: (Treatment may consist of any combination of the following)  1. Cryotherapy  2. Electrical Stimulation  3. Home Exercise Program (HEP)  4. Manual Therapy  5. Neuromuscular Re-education/Strengthening  6. Range of Motion (ROM)  7. Therapeutic Activites  8. Therapeutic Exercise/Strengthening     GOALS: (Goals have been discussed and agreed upon with patient.)  Short-Term Functional Goals: Time Frame: 4 weeks  1. Pt will demonstrate ability to perform initial HEP to help progress treatment (MET)  2. Pt will report 4/10 pain at worst to help improve quality of life (MET)  3. Pt will demonstrate an improvement in hand strength by 5 pounds in the left hand to show returning of strength in the L upper extremity (MET-7/6/21)  Discharge Goals: Time Frame: 12 weeks  1. Pt will demonstrate independence with final HEP (ongoing)  2. Pt will demonstrate ability to  a bottle with affect UE without pain to assist with ADLs (MET-7/6/21)  3. Pt will report ability to play guitar without pain in the lateral epicondyle (ongoing with improvement)  4. Pt will report ability to perform yoga routines without interruption from forearm pain (ongoing with improvement)  5. Pt will improve DASH by 10 points to improve daily function(ongoing)    OUTCOME MEASURE:   Tool Used: Disabilities of the Arm, Shoulder and Hand (DASH) Questionnaire - Quick Version  Score:  Initial: 39/55  Most Recent: 34/55 (Date: 7/6/21)   Interpretation of Score:  The DASH is designed to measure the activities of daily living in person's with upper extremity dysfunction or pain. Each section is scored on a 1-5 scale, 5 representing the greatest disability. The scores of each section are added together for a total score of 55. MEDICAL NECESSITY:   · Patient is expected to demonstrate progress in strength, range of motion, coordination and functional technique to improve daily function and recreational tasks. · Patient demonstrates good rehab potential due to higher previous functional level. REASON FOR SERVICES/OTHER COMMENTS:  · Patient requires therapy to improve on limitations listed. Total Duration:  PT Patient Time In/Time Out  Time In: 1115  Time Out: 1200    Rehabilitation Potential For Stated Goals: Good  Regarding Familiayuni Jones therapy, I certify that the treatment plan above will be carried out by a therapist or under their direction. Thank you for this referral,  Casa Jeter, PT     Referring Physician Signature: Dr. Rowena Emerson _______________________________ Date _____________     PAIN/SUBJECTIVE:   Initial: Pain Intensity 1: 4 at worst Post Session:  3/10   HISTORY:   History of Injury/Illness (Reason for Referral):  Pt arrived to the clinic with pain in the left elbow. Pt states that she has been dealing with this pain since March of 2020, but did not know if it was just a result of her lyme's disease. Pt states that during the pandemic, her activity in yoga and playing the guitar increased considerably, which could have flared the condition. Pt reports that she is in a considerable amount of pain when she plays guitar or when performing closed chain yoga exercises. Pt states that she is R hand dominant, but sleeps on her L side which can increase symptoms. Pt also states that she has fallen on her L shoulder in the past. Pt wants to return to PLOF without pain.   Past Medical History/Comorbidities:   Ms. Tra Wells  has a past medical history of Anemia, Arthritis, Cholecystitis (8/28/2014), Chronic pain, Glaucoma, History of skin cancer, Multiple thyroid nodules, Obstructive sleep apnea, Osteoarthritis of lumbar spine, Osteopenia, and Tennis elbow. She also has no past medical history of Adverse effect of anesthesia, Aneurysm ([address] Asthma, Autoimmune disease ([address] CAD (coronary artery disease), Chronic kidney disease, Chronic obstructive pulmonary disease ([address] Coagulation disorder ([address] Diabetes ([address] Difficult intubation, Endocarditis, GERD (gastroesophageal reflux disease), Heart failure ([address] Hypertension, Liver disease, Malignant hyperthermia due to anesthesia, Morbid obesity ([address] Nicotine vapor product user, Non-nicotine vapor product user, Pseudocholinesterase deficiency, Psychiatric disorder, PUD (peptic ulcer disease), Rheumatic fever, Seizures ([address] Stroke ([address] or Thromboembolus ([address] Ms. Jose Manuel Marie  has a past surgical history that includes hx wisdom teeth extraction; hx appendectomy (age 9); hx rhinoplasty (1990); hx colonoscopy; hx cholecystectomy (2014); hx vascular access; hx maxillofacial; hx septoplasty (02/01/2018); and hx cataract removal (Bilateral, 12/2019).   Social History/Living Environment:       Social History     Socioeconomic History    Marital status: SINGLE     Spouse name: Not on file    Number of children: Not on file    Years of education: Not on file    Highest education level: Not on file   Occupational History    Occupation: not currently employed   Tobacco Use    Smoking status: Never Smoker    Smokeless tobacco: Never Used   Substance and Sexual Activity    Alcohol use: No     Alcohol/week: 0.0 standard drinks    Drug use: No     Comment: none    Sexual activity: Not on file   Other Topics Concern    Not on file   Social History Narrative    Has a Master's degree    Lives alone     Social Determinants of Health     Financial Resource Strain:     Difficulty of Paying Living Expenses:    Food Insecurity:     Worried About Running Out of Food in the Last Year:     [address] in the Last Year:    Transportation Needs:     Lack of Transportation (Medical):  Lack of Transportation (Non-Medical):    Physical Activity:     Days of Exercise per Week:     Minutes of Exercise per Session:    Stress:     Feeling of Stress :    Social Connections:     Frequency of Communication with Friends and Family:     Frequency of Social Gatherings with Friends and Family:     Attends [employer] Services:     Active Member of Clubs or Organizations:     Attends Club or Organization Meetings:     Marital Status:    Intimate Partner Violence:     Fear of Current or Ex-Partner:     Emotionally Abused:     Physically Abused:     Sexually Abused:        Prior Level of Function/Work/Activity:  Independent with activity without pain  Out of work   Dominant Side:         RIGHT   Ambulatory/Rehab Services H2 Model Falls Risk Assessment   Risk Factors:       No Risk Factors Identified Ability to Rise from Chair:       (0)  Ability to rise in a single movement   Falls Prevention Plan:       No modifications necessary   Total: (5 or greater = High Risk): 0   ©2010 [facility] [address] States Patent #[id number].  Federal Law prohibits the replication, distribution or use without written permission from [facility]   Current Medications:       Current Outpatient Medications:     tretinoin (RETIN-A) 0.05 % topical cream, APPLY THINLY TO FACE EVERY EVENING AS TOLERATED, Disp: , Rfl:     hydrOXYzine HCL (ATARAX) 50 mg tablet, TAKE 1/2 TO 1 TABLET BY MOUTH 30 MINUTES PRIOR TO SLEEP, Disp: , Rfl:     hydroquinone (ESOTERICA, MELQUIN) 4 % topical cream, APPLY TO DARK SPOTS 2 TIMES A DAY FOR 3 MONTHS, Disp: , Rfl:     estradioL (ESTRACE) 0.01 % (0.1 mg/gram) vaginal cream, Apply pea-sized amount (0.5 g) nightly x 2 weeks, then twice weekly, Disp: , Rfl:     hydrOXYzine pamoate (VISTARIL) 25 mg capsule, Take 12.5 mg by mouth nightly., Disp: , Rfl:     timolol (TIMOPTIC) 0.5 % ophthalmic solution, INSTILL 1 DROP IN BOTH EYES DAILY; use day of surgery, Disp: , Rfl: 2    cholecalciferol, vitamin D3, (VITAMIN D3) 2,000 unit tab, Take  by mouth., Disp: , Rfl:     multivitamin (ONE A DAY) tablet, Take 1 Tab by mouth. Take 1 tab twice weekly, Disp: , Rfl:     mirtazapine (REMERON) 15 mg tablet, Take 3.2 mg by mouth nightly. Takes for pain and sleep, Disp: , Rfl:     rifabutin (MYCOBUTIN) 150 mg capsule, Take 1 Cap by mouth two (2) times a day. 150mg MWF week 1 and MTW week 2, Disp: , Rfl:     gabapentin (NEURONTIN) 100 mg capsule, Take 300 mg by mouth nightly., Disp: , Rfl: 6    lorazepam (ATIVAN) 1 mg tablet, Take 0.25 mg by mouth nightly.  Indications: difficulty sleeping, Disp: , Rfl:    Date Last Reviewed:  7/6/2021   Number of Personal Factors/Comorbidities that affect the Plan of Care: 1-2: MODERATE COMPLEXITY   EXAMINATION:   Observation/Orthostatic Postural Assessment:          Pt has rounded shoulders and forward head lean        Pt guards L arm use to prevent pain        Slight right cervical tilt in sitting/laying  Palpation:          Increased tone on proximal wrist extensors        Superficial fascial tightness on wrist extensors distal to lateral epicondyle        TTP on superior border of lateral epicondyle near joint line        Crepitus in glenohumeral joint during shoulder flexion/external rotation        Decreased glenohumeral inferior/posterior glide  ROM:          L shoulder ROM:   FE: 160 deg (not smooth) and some pain  ER: 70 deg        L elbow ROM          Strength:          Wrist MMT:  Flexion: 5/5 B  Extension: 5/5 R; 3/5 L (with pain)  UD: 5/5 B  RD: 5/5 B (with pain on L)        Shoulder MMT:  L shoulder ER: 4/5 with slight pain, otherwise all other functions WNL         Strength Dynamometer (pounds)  R: 25 pounds  L: 10 pounds     Body Structures Involved:  1. Bones  2. Joints  3. Muscles  4. Ligaments Body Functions Affected:  1. Neuromusculoskeletal  2. Movement Related Activities and Participation Affected:  1. General Tasks and Demands  2. Self Care  3. Interpersonal Interactions and Relationships  4.  Community, Social and [place]   Number of elements (examined above) that affect the Plan of Care: 4+: HIGH COMPLEXITY   CLINICAL PRESENTATION:   Presentation: Stable and uncomplicated: LOW COMPLEXITY   CLINICAL DECISION MAKING:   Use of outcome tool(s) and clinical judgement create a POC that gives a: Clear prediction of patient's progress: LOW COMPLEXITY

## 2021-07-06 NOTE — PROGRESS NOTES
Arnel Patel  : 1960  Primary: Nola Jensen Medicare Advantage  Secondary:  [address] at [facility]  [address] [place] [address]  [phone]   [phone]      OUTPATIENT PHYSICAL THERAPY: Daily Treatment Note 2021  Visit Count:  18    ICD-10: Treatment Diagnosis:   Lateral Epicondylitis, Left M77.12  Pain in joint, Left Elbow M25.522  Pain in joint, left shoulder, M25.512  Precautions/Allergies:   Latex, natural rubber; Dexamethasone; and Other medication   TREATMENT PLAN:  Effective Dates: 2021 TO 10/6/21). Frequency/Duration: 1 time every month for 3 months MEDICAL/REFERRING DIAGNOSIS:  Lateral epicondylitis, left elbow [M77.12]  DATE OF ONSET: 2020  REFERRING PHYSICIAN:  Dr. Coty Freeman MD Orders: Evaluate and Treat  Return MD Appointment: TBD       Pre-treatment Symptoms/Complaints:  Pt reports that she started to play more guitar again and it has flared up a little. It is not as bad and maybe it is her posture and position while she is playing. She is feeling it some in the right elbow too. Pain: Initial: Pain Intensity 1: 410 Post Session:  1/10   Medications Last Reviewed:  2021  Updated Objective Findings:  Some suboccipital tightness right greater than left today. TREATMENT:     THERAPEUTIC EXERCISE: (0 minutes):  Exercises per grid below to improve mobility, strength and coordination. Required minimal visual, verbal and manual cues to promote proper body alignment, promote proper body posture and promote proper body mechanics. Progressed resistance, range, repetitions and complexity of movement as indicated.    Date:  2021   Activity/Exercise Parameters   Wrist extensor stretch  5 x 30 sec   B shoulder ER 3 x 10   Scapular setting 2 x 10   Pt ed Scapular position   Pronation/Supination (not today) With red band today x 15    Prone on elbows (not today) Scapular stabilization x 5 min   Sleeper stretch Left side lie 5 x 10 sec hold with self contract relax   First rib mobilization With strap x 5 min     MANUAL THERAPY: (35 minutes): Joint mobilization and Soft tissue mobilization was utilized and necessary because of the patient's restricted joint motion, loss of articular motion and restricted motion of soft tissue. - superficial fascial mobilization of proximal wrist extensors immediately distal to the lateral epicondyle   - Manual stretch of the extensors into wrist flexion and radial deviation with full elbow extension   - 1st rib mobilization on Left  -Suboccipital release right with axial elongation   - soft tissue mobilization around clavicle, subscapularis, Latissimus Dorsi, wrist extensors, biceps, scalenes. - posterior/inferior glenohumeral glides   - radial glides into wrist flexion and supination   - Olecranon mobilization into trochlear notch   - scapular post depression/anterior elevation with post depression hold after contract/relax    Modalities:   Ultrasound (10 min): 50% at 2 mHz for 8 min to left epicondyle. Treatment/Session Summary:    · Response to Treatment:  Pt symptoms are improving with time and mobilizations. We extended her plan of care due to more recent exacerbations. Still only follow up 1 time a month  · Communication/Consultation:  None today  · Equipment provided today:  None today  · Recommendations/Intent for next treatment session: Next visit will focus on shoulder ROM and function and isometric strengthening of wrist extensors.     Total Treatment Billable Duration:  45 minutes  PT Patient Time In/Time Out  Time In: 1115  Time Out: [facility] [address] PT    Future Appointments   Date Time Provider Jono Butcher   7/29/2021  9:20 AM MD Sri Tinsley   8/3/2021 11:00 AM Tom Terry, FRANDY [facility]

## 2021-07-09 PROBLEM — J32.9 SINUSITIS: Status: ACTIVE | Noted: 2021-07-09

## 2021-07-09 PROBLEM — J02.9 PHARYNGITIS: Status: ACTIVE | Noted: 2021-07-09

## 2021-07-09 PROBLEM — Z98.890 HX OF SINUS SURGERY: Status: ACTIVE | Noted: 2021-07-09

## 2021-07-20 PROBLEM — M85.80 OSTEOPENIA DETERMINED BY X-RAY: Status: ACTIVE | Noted: 2017-06-08

## 2021-07-29 PROBLEM — J32.9 RECURRENT SINUS INFECTIONS: Status: ACTIVE | Noted: 2021-07-29

## 2021-07-31 PROBLEM — J30.9 ALLERGIC RHINITIS DUE TO ALLERGEN: Status: ACTIVE | Noted: 2021-07-31

## 2021-07-31 PROBLEM — R29.818 SUSPECTED SLEEP APNEA: Status: ACTIVE | Noted: 2021-07-31

## 2021-07-31 PROBLEM — R00.1 BRADYCARDIA: Status: ACTIVE | Noted: 2021-07-31

## 2021-08-03 ENCOUNTER — APPOINTMENT (OUTPATIENT)
Dept: PHYSICAL THERAPY | Age: 61
End: 2021-08-03
Payer: MEDICARE

## 2021-08-12 ENCOUNTER — HOSPITAL ENCOUNTER (OUTPATIENT)
Dept: PHYSICAL THERAPY | Age: 61
Discharge: HOME | End: 2021-08-12
Payer: MEDICARE

## 2021-08-12 PROCEDURE — 97035 APP MDLTY 1+ULTRASOUND EA 15: CPT

## 2021-08-12 PROCEDURE — 97140 MANUAL THERAPY 1/> REGIONS: CPT

## 2021-08-12 NOTE — PROGRESS NOTES
Kevin Patel  : 1960  Primary: Telly Murray Medicare Advantage  Secondary:  [address]  at [facility]  [address] [place] [address]  [phone]   [phone]      OUTPATIENT PHYSICAL THERAPY: Daily Treatment Note 2021  Visit Count:  19    ICD-10: Treatment Diagnosis:   Lateral Epicondylitis, Left M77.12  Pain in joint, Left Elbow M25.522  Pain in joint, left shoulder, M25.512  Precautions/Allergies:   Latex, natural rubber; Dexamethasone; and Other medication   TREATMENT PLAN:  Effective Dates: 2021 TO 10/6/21). Frequency/Duration: 1 time every month for 3 months MEDICAL/REFERRING DIAGNOSIS:  Lateral epicondylitis, left elbow [M77.12]  DATE OF ONSET: 2020  REFERRING PHYSICIAN:  Dr. Flo Jean MD Orders: Evaluate and Treat  Return MD Appointment: TBD       Pre-treatment Symptoms/Complaints:  Pt reports that she started to play more guitar again and the left side is feeling ok, but the right side is a little painful now. She just had a visit up in [place] with her [place] doctor and is getting some scans done this week  Pain: Initial: Pain Intensity 1: 3/10 Post Session:  1/10   Medications Last Reviewed:  2021  Updated Objective Findings:  Some suboccipital tightness right greater than left today. TREATMENT:     THERAPEUTIC EXERCISE: (0 minutes):  Exercises per grid below to improve mobility, strength and coordination. Required minimal visual, verbal and manual cues to promote proper body alignment, promote proper body posture and promote proper body mechanics. Progressed resistance, range, repetitions and complexity of movement as indicated.    Date:  2021   Activity/Exercise Parameters   Wrist extensor stretch  5 x 30 sec   B shoulder ER 3 x 10   Scapular setting 2 x 10   Pt ed Scapular position   Pronation/Supination (not today) With red band today x 15    Prone on elbows (not today) Scapular stabilization x 5 min   Sleeper stretch Left side lie 5 x 10 sec hold with self contract relax   First rib mobilization With strap x 5 min     MANUAL THERAPY: (35 minutes): Joint mobilization and Soft tissue mobilization was utilized and necessary because of the patient's restricted joint motion, loss of articular motion and restricted motion of soft tissue. - superficial fascial mobilization of proximal wrist extensors immediately distal to the lateral epicondyle   - Manual stretch of the extensors into wrist flexion and radial deviation with full elbow extension   - 1st rib mobilization on Left  -Suboccipital release right with axial elongation   - soft tissue mobilization around clavicle, subscapularis, Latissimus Dorsi, wrist extensors, biceps, scalenes. - posterior/inferior glenohumeral glides   - radial glides into wrist flexion and supination   - Olecranon mobilization into trochlear notch   - scapular post depression/anterior elevation with post depression hold after contract/relax    Modalities:   Ultrasound (10 min): 50% at 2 mHz for 8 min to right epicondyle. Treatment/Session Summary:    · Response to Treatment:  Pt symptoms are improving with time and mobilizations. We worked on the right elbow today and she is doing better at managing the left one. Still needs cues for posture and position  · Communication/Consultation:  None today  · Equipment provided today:  None today  · Recommendations/Intent for next treatment session: Next visit will focus on shoulder ROM and function and isometric strengthening of wrist extensors.     Total Treatment Billable Duration:  45 minutes  PT Patient Time In/Time Out  Time In: 1104  Time Out: [place] [id number], PT    Future Appointments   Date Time Provider Jono Butcher   8/23/2021 11:30 AM Alexa Carrero MD [facility]   9/9/2021 11:00 AM Josselyn London, PT [facility]   9/10/2021  7:30 AM S[facility] DEXA BI GE LUNAR DEXA SFERMAM SFE   9/29/2021  3:30 PM Silver Quinones MD [facility] Mandie Vidal   10/29/2021  9:20 AM Cheree Kawasaki.MD Anai

## 2021-08-13 LAB — MAMMOGRAPHY, EXTERNAL: NORMAL

## 2021-09-09 ENCOUNTER — HOSPITAL ENCOUNTER (OUTPATIENT)
Dept: PHYSICAL THERAPY | Age: 61
Discharge: HOME | End: 2021-09-09
Payer: MEDICARE

## 2021-09-09 PROCEDURE — 97140 MANUAL THERAPY 1/> REGIONS: CPT

## 2021-09-09 NOTE — PROGRESS NOTES
Michael Patel  : 1960  Primary: Sreekanth Caraballo Medicare Advantage  Secondary:  [address]  at [facility]  [address] [place] [address]  [phone]   [phone]      OUTPATIENT PHYSICAL THERAPY: Daily Treatment Note 2021  Visit Count:  20    ICD-10: Treatment Diagnosis:   Lateral Epicondylitis, Left M77.12  Pain in joint, Left Elbow M25.522  Pain in joint, left shoulder, M25.512  Precautions/Allergies:   Latex, natural rubber; Dexamethasone; and Other medication   TREATMENT PLAN:  Effective Dates: 2021 TO 10/6/21). Frequency/Duration: 1 time every month for 3 months MEDICAL/REFERRING DIAGNOSIS:  Lateral epicondylitis, left elbow [M77.12]  DATE OF ONSET: 2020  REFERRING PHYSICIAN:  Dr. Chris Quiroz MD Orders: Evaluate and Treat  Return MD Appointment: TBD       Pre-treatment Symptoms/Complaints:  Pt reports that she is doing ok with the elbows today with just a small point tender spot on both. She is still having increased headaches and neck stiffness. Pain: Initial: Pain Intensity 1: 310 Post Session:  1/10   Medications Last Reviewed:  2021  Updated Objective Findings:  Some suboccipital tightness right greater than left today. TREATMENT:     THERAPEUTIC EXERCISE: (0 minutes):  Exercises per grid below to improve mobility, strength and coordination. Required minimal visual, verbal and manual cues to promote proper body alignment, promote proper body posture and promote proper body mechanics. Progressed resistance, range, repetitions and complexity of movement as indicated.    Date:  2021   Activity/Exercise Parameters   Wrist extensor stretch  5 x 30 sec   B shoulder ER 3 x 10   Scapular setting 2 x 10   Pt ed Scapular position   Pronation/Supination (not today) With red band today x 15    Prone on elbows (not today) Scapular stabilization x 5 min   Sleeper stretch Left side lie 5 x 10 sec hold with self contract relax   First rib mobilization With strap x 5 min     MANUAL THERAPY: (40 minutes): Joint mobilization and Soft tissue mobilization was utilized and necessary because of the patient's restricted joint motion, loss of articular motion and restricted motion of soft tissue. - superficial fascial mobilization of proximal wrist extensors immediately distal to the lateral epicondyle   - Manual stretch of the extensors into wrist flexion and radial deviation with full elbow extension   - 1st rib mobilization on Left  -Suboccipital release right with axial elongation   - soft tissue mobilization around clavicle, subscapularis, Latissimus Dorsi, wrist extensors, biceps, scalenes. - posterior/inferior glenohumeral glides   - radial glides into wrist flexion and supination   - Olecranon mobilization into trochlear notch   - scapular post depression/anterior elevation with post depression hold after contract/relax    Modalities:   Ultrasound (0 min): 50% at 2 mHz for 8 min to right epicondyle. Treatment/Session Summary:    · Response to Treatment:  Pt symptoms are improving with time and mobilizations. We will continue to work cervical and shoulder position to alleviate her symptoms and pain. · Communication/Consultation:  None today  · Equipment provided today:  None today  · Recommendations/Intent for next treatment session: Next visit will focus on shoulder ROM and function and isometric strengthening of wrist extensors.     Total Treatment Billable Duration:  40 minutes  PT Patient Time In/Time Out  Time In: 1108  Time Out: Ro[address]., PT    Future Appointments   Date Time Provider Jono Butcher   9/10/2021  7:30 AM [facility] DEXA BI GE LUNAR DEXA SFERMAM SFE   9/16/2021  2:00 PM Lucian Schofield NP [facility]   9/29/2021  3:30 PM Debo Quinones MD [facility]   10/29/2021  9:20 AM MD June Alvarado

## 2021-09-10 ENCOUNTER — HOSPITAL ENCOUNTER (OUTPATIENT)
Dept: MAMMOGRAPHY | Age: 61
Discharge: HOME | End: 2021-09-10
Attending: FAMILY MEDICINE
Payer: MEDICARE

## 2021-09-10 DIAGNOSIS — M85.80 OSTEOPENIA, UNSPECIFIED LOCATION: ICD-10-CM

## 2021-09-10 PROCEDURE — 77080 DXA BONE DENSITY AXIAL: CPT

## 2021-09-12 NOTE — PROGRESS NOTES
Bone density shows osteopenia but NOT osteoporosis. No changes in treatment are needed at this time based on these results.   Pt has OSTEOPENIA--low bone density--not brittle yet  Pt should take calcium/vitamin d 1000 mg/1000 units in dived doses daily  Pt to eat calcium rich foods  10 min sunexposure when possible  Walk daily 30 min daily

## 2021-09-16 PROBLEM — G25.81 RESTLESS LEGS: Status: ACTIVE | Noted: 2021-09-16

## 2021-09-16 PROBLEM — G47.34 NOCTURNAL HYPOXEMIA: Status: ACTIVE | Noted: 2021-09-16

## 2021-10-05 ENCOUNTER — HOSPITAL ENCOUNTER (OUTPATIENT)
Dept: PHYSICAL THERAPY | Age: 61
Discharge: HOME | End: 2021-10-05
Payer: MEDICARE

## 2021-10-05 PROCEDURE — 97140 MANUAL THERAPY 1/> REGIONS: CPT

## 2021-10-05 NOTE — THERAPY RECERTIFICATION
Shanelle Patel  : 1960  Primary: Jesús Rowley Medicare Advantage  Secondary:  [address]  at [address]  [phone]   [phone]         OUTPATIENT PHYSICAL THERAPY:Recertification 99/3/2890   ICD-10: Treatment Diagnosis:   Lateral Epicondylitis, Left M77.12  Pain in joint, Left Elbow M25.522  Pain in joint, left shoulder, M25.512  Cervicalgia  Precautions/Allergies:   Latex, natural rubber; Dexamethasone; and Other medication   TREATMENT PLAN:  Effective Dates: 10/5/2021 TO 22). Frequency/Duration: 1 time every month for 3 months MEDICAL/REFERRING DIAGNOSIS:  Lateral epicondylitis, left elbow [M77.12]  DATE OF ONSET: 2020  REFERRING PHYSICIAN:Dr. Maricarmen Galicia MD Orders: Evaluate and Treat  Return MD Appointment: JERI   Kary Groves has been seen for 18 visits from 20 to 10/5/2021 for left arm and elbow pain. Patient has performed therapeutic exercises, activities, and had manual therapy to increased strength, ROM and function. Patient has also used modalities for pain control in order to increase function. Patient has shown an increase in function per the DASH with scores of 29/55 due to recent exacerbations of her pain the last several weeks. She is having more pain in the cervical spine and head at this time. She is having an MRI soon to determine if there is anything else going on. She has progressed with some of her goals, but still is having issues with the shoulder and neck. Patient has progressed well toward their goals and will benefit from continuing skilled PT in order to address their impairments. Esther Saba, PT, DPT, OCS, CFMT          INITIAL ASSESSMENT:  Ms. Octavio Cunningham presents to the clinic with pain, strength, and functional deficits in the left elbow with signs and symptoms consistent with lateral epicondylitis.  Pt shoulder ROM and functional impairments play a role in the elbow compensating to continue performing daily function and activities. Pt has marked weakness between left and right  strength that could be caused by a radial nerve compression at the elbow. Pt has Lyme's disease which can affect treatment prognosis. Pt needs skilled PT to help address impairments and improve function as we progress towards goals. Gustavo Kasper, PT, DPT, OCS, CFMT     PROBLEM LIST (Impacting functional limitations):  1. Decreased Strength  2. Decreased ADL/Functional Activities  3. Increased Pain  4. Decreased Activity Tolerance  5. Decreased Flexibility/Joint Mobility INTERVENTIONS PLANNED: (Treatment may consist of any combination of the following)  1. Cryotherapy  2. Electrical Stimulation  3. Home Exercise Program (HEP)  4. Manual Therapy  5. Neuromuscular Re-education/Strengthening  6. Range of Motion (ROM)  7. Therapeutic Activites  8. Therapeutic Exercise/Strengthening     GOALS: (Goals have been discussed and agreed upon with patient.)  Discharge Goals: Time Frame: 12 weeks  1. Pt will demonstrate independence with final HEP (ongoing)  2. Pt will demonstrate ability to  a bottle with affect UE without pain to assist with ADLs (MET-7/6/21)  3. Pt will report ability to play guitar without pain in the lateral epicondyle (ongoing with improvement)  4. Pt will report ability to perform yoga routines without interruption from forearm pain (ongoing with improvement)  5. Pt will improve DASH by 10 points to improve daily function(MET-10/5/21)    OUTCOME MEASURE:   Tool Used: Disabilities of the Arm, Shoulder and Hand (DASH) Questionnaire - Quick Version  Score:  Initial: 39/55  Most Recent: 29/55 (Date: 10/5/21)   Interpretation of Score: The DASH is designed to measure the activities of daily living in person's with upper extremity dysfunction or pain. Each section is scored on a 1-5 scale, 5 representing the greatest disability.   The scores of each section are added together for a total score of 55. Tool Used: Neck Disability Index (NDI)  Score:  Initial: 21/50  Most Recent: X/50 (Date: -- )   Interpretation of Score: The Neck Disability Index is a revised form of the Oswestry Low Back Pain Index and is designed to measure the activities of daily living in person's with neck pain. Each section is scored on a 0-5 scale, 5 representing the greatest disability. The scores of each section are added together for a total score of 50. MEDICAL NECESSITY:   · Patient is expected to demonstrate progress in strength, range of motion, coordination and functional technique to improve daily function and recreational tasks. · Patient demonstrates good rehab potential due to higher previous functional level. REASON FOR SERVICES/OTHER COMMENTS:  · Patient requires therapy to improve on limitations listed. Total Duration:  PT Patient Time In/Time Out  Time In: 1109  Time Out: 1202    Rehabilitation Potential For Stated Goals: Good  Regarding Zehra Patel's therapy, I certify that the treatment plan above will be carried out by a therapist or under their direction. Thank you for this referral,  Walker Valencia, PT     Referring Physician Signature: Dr. Jan Patterson _______________________________ Date _____________     PAIN/SUBJECTIVE:   Initial: Pain Intensity 1: 5 at worst Post Session:  3/10   HISTORY:   History of Injury/Illness (Reason for Referral):  Pt arrived to the clinic with pain in the left elbow. Pt states that she has been dealing with this pain since March of 2020, but did not know if it was just a result of her lyme's disease. Pt states that during the pandemic, her activity in yoga and playing the guitar increased considerably, which could have flared the condition. Pt reports that she is in a considerable amount of pain when she plays guitar or when performing closed chain yoga exercises. Pt states that she is R hand dominant, but sleeps on her L side which can increase symptoms. Pt also states that she has fallen on her L shoulder in the past. Pt wants to return to PLOF without pain. Past Medical History/Comorbidities:   Ms. Gregory Obregon  has a past medical history of Anemia, Arthritis, Cholecystitis (8/28/2014), Chronic pain, Glaucoma, History of skin cancer, Multiple thyroid nodules, Obstructive sleep apnea, Osteoarthritis of lumbar spine, Osteopenia, and Tennis elbow. She also has no past medical history of Adverse effect of anesthesia, Aneurysm ([address] Asthma, Autoimmune disease ([address] CAD (coronary artery disease), Chronic kidney disease, Chronic obstructive pulmonary disease ([address] Coagulation disorder ([address] Diabetes ([address] Difficult intubation, Endocarditis, GERD (gastroesophageal reflux disease), Heart failure ([address] Hypertension, Liver disease, Malignant hyperthermia due to anesthesia, Morbid obesity ([address] Nicotine vapor product user, Non-nicotine vapor product user, Pseudocholinesterase deficiency, Psychiatric disorder, PUD (peptic ulcer disease), Rheumatic fever, Seizures ([address] Stroke ([address] or Thromboembolus ([address] Ms. Gregoyr Obregon  has a past surgical history that includes hx wisdom teeth extraction; hx appendectomy (age 9); hx rhinoplasty (1990); hx colonoscopy; hx cholecystectomy (2014); hx vascular access; hx maxillofacial; hx septoplasty (02/01/2018); and hx cataract removal (Bilateral, 12/2019).   Social History/Living Environment:       Social History     Socioeconomic History    Marital status: SINGLE     Spouse name: Not on file    Number of children: Not on file    Years of education: Not on file    Highest education level: Not on file   Occupational History    Occupation: not currently employed   Tobacco Use    Smoking status: Never Smoker    Smokeless tobacco: Never Used   Substance and Sexual Activity    Alcohol use: No     Alcohol/week: 0.0 standard drinks    Drug use: No     Comment: none    Sexual activity: Not on file   Other Topics Concern    Not on file Social History Narrative    Has a Master's degree    Lives alone     Social Determinants of Health     Financial Resource Strain:     Difficulty of Paying Living Expenses:    Food Insecurity:     Worried About Running Out of Food in the Last Year:     [address] in the Last Year:    Transportation Needs:     Lack of Transportation (Medical):  Lack of Transportation (Non-Medical):    Physical Activity:     Days of Exercise per Week:     Minutes of Exercise per Session:    Stress:     Feeling of Stress :    Social Connections:     Frequency of Communication with Friends and Family:     Frequency of Social Gatherings with Friends and Family:     Attends Hinduism Services:     Active Member of Clubs or Organizations:     Attends Club or Organization Meetings:     Marital Status:    Intimate Partner Violence:     Fear of Current or Ex-Partner:     Emotionally Abused:     Physically Abused:     Sexually Abused:        Prior Level of Function/Work/Activity:  Independent with activity without pain  Out of work   Dominant Side:         RIGHT   Ambulatory/Rehab Services H2 Model Falls Risk Assessment   Risk Factors:       No Risk Factors Identified Ability to Rise from Chair:       (0)  Ability to rise in a single movement   Falls Prevention Plan:       No modifications necessary   Total: (5 or greater = High Risk): 0   ©2010 [facility] [address] [place] [address] States Patent #[id number]. Federal Law prohibits the replication, distribution or use without written permission from [facility] [employer]   Current Medications:       Current Outpatient Medications:     montelukast (Singulair) 10 mg tablet, Take 10 mg by mouth nightly., Disp: , Rfl:     fexofenadine (Allegra Allergy) 180 mg tablet, Take  by mouth., Disp: , Rfl:     L-ORNITHINE PO, Take 100 g by mouth daily. L Aspartate 3 full teaspoons daily, Disp: , Rfl:     cholecalciferol, vitamin D3, (VITAMIN D3 PO), 5,000 Units. , Disp: , Rfl:     OTHER, 500 mg. Lactoferrin, Disp: , Rfl:     magnesium 200 mg tab, 200 mg. 3 caps, Disp: , Rfl:     MANGANESE PO, 10 mg daily. , Disp: , Rfl:     ascorbic acid, vitamin C, (Vitamin C) 1,000 mg tablet, , Disp: , Rfl:     calcium carbonate (CALCIUM 300 PO), , Disp: , Rfl:     Cyanocobalamin-Cobamamide (B12) 5,000-100 mcg lozg, , Disp: , Rfl:     OTHER, 1,800 mcg two (2) times a day. Methyl folate, Disp: , Rfl:     MOLYBDENUM, BULK,, 1,500 mcg daily. , Disp: , Rfl:     OTHER, 1,100 mg two (2) times a day. Nattokinese, Disp: , Rfl:     OTHER, Phospholipids, Disp: , Rfl:     OTHER, 500 mg.  Quercitin, Disp: , Rfl:     OTHER, Zinc/joan 15/1, Disp: , Rfl:     famotidine (PEPCID) 10 mg tablet, Take 20 mg by mouth two (2) times a day., Disp: , Rfl:     fluconazole (DIFLUCAN) 200 mg tablet, , Disp: , Rfl:     Lactobac 40-Bifido 3-S.thermop (Probiotic) 100 billion cell cap, , Disp: , Rfl:     levocarnitine HCl (ACETYL-L-CARNITINE), , Disp: , Rfl:     melatonin 1 mg subl, , Disp: , Rfl:     minocycline (Minocin) 100 mg capsule, , Disp: , Rfl:     fish oil-omega-3-DHA-EPA (Fish Oil) 300-1,000 mg capsule, , Disp: , Rfl:     organ concentrates (ADRENAL PO), , Disp: , Rfl:     prasterone, DHEA, (DHEA PO), , Disp: , Rfl:     resveratroL 250 mg cap, , Disp: , Rfl:     Saccharomyces boulardii (FLORASTOR) 250 mg capsule, , Disp: , Rfl:     Taurine 500 mg cap, , Disp: , Rfl:     tinidazole (TINDAMAX) 500 mg tablet, , Disp: , Rfl:     CoQ10, Ubiquinol, 200 mg cap, , Disp: , Rfl:     vitamin K2 100 mcg cap, , Disp: , Rfl:     Xylitol, Bulk, powd, , Disp: , Rfl:     tretinoin (RETIN-A) 0.05 % topical cream, APPLY THINLY TO FACE EVERY EVENING AS TOLERATED, Disp: , Rfl:     hydrOXYzine HCL (ATARAX) 50 mg tablet, TAKE 1/2 TO 1 TABLET BY MOUTH 30 MINUTES PRIOR TO SLEEP, Disp: , Rfl:     hydroquinone (ESOTERICA, MELQUIN) 4 % topical cream, APPLY TO DARK SPOTS 2 TIMES A DAY FOR 3 MONTHS, Disp: , Rfl:     estradioL (ESTRACE) 0.01 % (0.1 mg/gram) vaginal cream, Apply pea-sized amount (0.5 g) nightly x 2 weeks, then twice weekly, Disp: , Rfl:     timolol (TIMOPTIC) 0.5 % ophthalmic solution, INSTILL 1 DROP IN BOTH EYES DAILY; use day of surgery, Disp: , Rfl: 2    multivitamin (ONE A DAY) tablet, Take 1 Tab by mouth. Take 1 tab twice weekly, Disp: , Rfl:     mirtazapine (REMERON) 15 mg tablet, Take 3.2 mg by mouth nightly. Takes for pain and sleep, Disp: , Rfl:     rifabutin (MYCOBUTIN) 150 mg capsule, Take 1 Capsule by mouth two (2) times a day. 150mg MWF week 1 and MTW week 2, Disp: , Rfl:     gabapentin (NEURONTIN) 100 mg capsule, Take 300 mg by mouth nightly., Disp: , Rfl: 6    lorazepam (ATIVAN) 1 mg tablet, Take 0.25 mg by mouth nightly.  Indications: difficulty sleeping, Disp: , Rfl:    Date Last Reviewed:  10/5/2021   Number of Personal Factors/Comorbidities that affect the Plan of Care: 1-2: MODERATE COMPLEXITY   EXAMINATION:   Observation/Orthostatic Postural Assessment:          Pt has rounded shoulders and forward head lean        Pt guards L arm use to prevent pain        Slight right cervical tilt in sitting/laying  Palpation:          Increased tone on proximal wrist extensors        Superficial fascial tightness on wrist extensors distal to lateral epicondyle        TTP on superior border of lateral epicondyle near joint line        Crepitus in glenohumeral joint during shoulder flexion/external rotation        Decreased glenohumeral inferior/posterior glide  Cervical-tightness in paraspinals, suboccipitals, and SCM  ROM:          L shoulder ROM:   FE: 160 deg (not smooth) and some pain  ER: 70 deg        L elbow ROM          Cervical Rotation: 75% left and 80% right  Strength:          Wrist MMT:  Flexion: 5/5 B  Extension: 5/5 R; 3/5 L (with pain)  UD: 5/5 B  RD: 5/5 B (with pain on L)        Shoulder MMT:  L shoulder ER: 4/5 with slight pain, otherwise all other functions WNL  Strength Dynamometer (pounds)  R: 25 pounds  L: 10 pounds     Body Structures Involved:  1. Bones  2. Joints  3. Muscles  4. Ligaments Body Functions Affected:  1. Neuromusculoskeletal  2. Movement Related Activities and Participation Affected:  1. General Tasks and Demands  2. Self Care  3. Interpersonal Interactions and Relationships  4.  Community, Social and [place]   Number of elements (examined above) that affect the Plan of Care: 4+: HIGH COMPLEXITY   CLINICAL PRESENTATION:   Presentation: Stable and uncomplicated: LOW COMPLEXITY   CLINICAL DECISION MAKING:   Use of outcome tool(s) and clinical judgement create a POC that gives a: Clear prediction of patient's progress: LOW COMPLEXITY

## 2021-10-05 NOTE — PROGRESS NOTES
Eleonora Patel  : 1960  Primary: Bartolo Cough Medicare Advantage  Secondary:  [address]  at [facility]  [address] [place] [address]  [phone]   [phone]      OUTPATIENT PHYSICAL THERAPY: Daily Treatment Note 10/5/2021  Visit Count:  21    ICD-10: Treatment Diagnosis:   Lateral Epicondylitis, Left M77.12  Pain in joint, Left Elbow M25.522  Pain in joint, left shoulder, M25.512  Precautions/Allergies:   Latex, natural rubber; Dexamethasone; and Other medication   TREATMENT PLAN:  Effective Dates: 10/5/2021 TO 22). Frequency/Duration: 1 time every month for 3 months MEDICAL/REFERRING DIAGNOSIS:  Lateral epicondylitis, left elbow [M77.12]  DATE OF ONSET: 2020  REFERRING PHYSICIAN:  Dr. Rizwan Bose MD Orders: Evaluate and Treat  Return MD Appointment: TBD       Pre-treatment Symptoms/Complaints:  Pt reports that she is having more head and neck pain lately. She has an MRI coming up for those. She also found that she has had decreased bone density per past time. Pain: Initial: Pain Intensity 1: 5/10 Post Session:  1/10   Medications Last Reviewed:  10/5/2021  Updated Objective Findings:  Some suboccipital tightness right greater than left today. TREATMENT:     THERAPEUTIC EXERCISE: (0 minutes):  Exercises per grid below to improve mobility, strength and coordination. Required minimal visual, verbal and manual cues to promote proper body alignment, promote proper body posture and promote proper body mechanics. Progressed resistance, range, repetitions and complexity of movement as indicated.    Date:  10/5/2021   Activity/Exercise Parameters   Wrist extensor stretch  5 x 30 sec   B shoulder ER 3 x 10   Scapular setting 2 x 10   Pt ed Scapular position   Pronation/Supination (not today) With red band today x 15    Prone on elbows (not today) Scapular stabilization x 5 min   Sleeper stretch Left side lie 5 x 10 sec hold with self contract relax First rib mobilization With strap x 5 min     MANUAL THERAPY: (40 minutes): Joint mobilization and Soft tissue mobilization was utilized and necessary because of the patient's restricted joint motion, loss of articular motion and restricted motion of soft tissue. - superficial fascial mobilization of proximal wrist extensors immediately distal to the lateral epicondyle   - Manual stretch of the extensors into wrist flexion and radial deviation with full elbow extension   - 1st rib mobilization on Left  -Suboccipital release right with axial elongation   - soft tissue mobilization around clavicle, subscapularis, Latissimus Dorsi, wrist extensors, biceps, scalenes. - posterior/inferior glenohumeral glides   - radial glides into wrist flexion and supination   - Olecranon mobilization into trochlear notch   - scapular post depression/anterior elevation with post depression hold after contract/relax  -Cranial suture mobilization right side frontal and sagittal sutures    Modalities:   Ultrasound (0 min): 50% at 2 mHz for 8 min to right epicondyle. Treatment/Session Summary:    · Response to Treatment:  Pt symptoms are improving with time and mobilizations. Follow up in a month after further testing  · Communication/Consultation:  None today  · Equipment provided today:  None today  · Recommendations/Intent for next treatment session: Next visit will focus on shoulder ROM and function and isometric strengthening of wrist extensors.     Total Treatment Billable Duration:  40 minutes  PT Patient Time In/Time Out  Time In: 4249  Time Out: [address], PT    Future Appointments   Date Time Provider Jono Butcher   10/29/2021  9:20 AM Wu Weathers MD [id number] [place]   11/2/2021 11:00 AM Amberly Collier, PT [facility]   12/21/2021 10:40 AM Joan Schofield, MARIE [facility] PP

## 2021-10-12 ENCOUNTER — HOSPITAL ENCOUNTER (OUTPATIENT)
Dept: SLEEP MEDICINE | Age: 61
Discharge: HOME | End: 2021-10-12
Payer: MEDICARE

## 2021-10-12 PROCEDURE — 95806 SLEEP STUDY UNATT&RESP EFFT: CPT

## 2021-10-25 PROBLEM — R09.02 HYPOXEMIA: Status: ACTIVE | Noted: 2021-10-25

## 2021-10-31 PROBLEM — Z99.89 OSA ON CPAP: Status: ACTIVE | Noted: 2021-10-31

## 2021-10-31 PROBLEM — G25.81 RESTLESS LEG SYNDROME: Status: ACTIVE | Noted: 2021-10-31

## 2021-10-31 PROBLEM — M54.2 CERVICALGIA: Status: ACTIVE | Noted: 2021-10-31

## 2021-10-31 PROBLEM — M50.30 DDD (DEGENERATIVE DISC DISEASE), CERVICAL: Status: ACTIVE | Noted: 2021-10-31

## 2021-10-31 PROBLEM — M54.12 CERVICAL RADICULOPATHY: Status: ACTIVE | Noted: 2021-10-31

## 2021-10-31 PROBLEM — G47.33 OSA ON CPAP: Status: ACTIVE | Noted: 2021-10-31

## 2021-11-02 ENCOUNTER — HOSPITAL ENCOUNTER (OUTPATIENT)
Dept: PHYSICAL THERAPY | Age: 61
Discharge: HOME | End: 2021-11-02
Payer: MEDICARE

## 2021-11-02 PROCEDURE — 97140 MANUAL THERAPY 1/> REGIONS: CPT

## 2021-11-02 PROCEDURE — 97110 THERAPEUTIC EXERCISES: CPT

## 2021-11-02 NOTE — PROGRESS NOTES
Tonikael Patel  : 1960  Primary: Issac Narrow Medicare Advantage  Secondary:  [address]  at [facility]  [address] [place] [address]  [phone]   [phone]      OUTPATIENT PHYSICAL THERAPY: Daily Treatment Note 2021  Visit Count:  22    ICD-10: Treatment Diagnosis:   Lateral Epicondylitis, Left M77.12  Pain in joint, Left Elbow M25.522  Pain in joint, left shoulder, M25.512  Precautions/Allergies:   Latex, natural rubber; Dexamethasone; and Other medication   TREATMENT PLAN:  Effective Dates: 10/5/2021 TO 22). Frequency/Duration: 1 time every month for 3 months MEDICAL/REFERRING DIAGNOSIS:  Lateral epicondylitis, left elbow [M77.12]  DATE OF ONSET: 2020  REFERRING PHYSICIAN:  Dr. Surinder Stearns MD Orders: Evaluate and Treat  Return MD Appointment: TBD       Pre-treatment Symptoms/Complaints:  Pt reports that they saw more degeneration and compression in the cervical spine. She is going to see Dr [facility]LEG[facility] in February, but may see another orthopedic doctor before then. Pain: Initial: Pain Intensity 1: 3/10 Post Session:  1/10   Medications Last Reviewed:  2021  Updated Objective Findings:  Some suboccipital tightness right greater than left today. TREATMENT:     THERAPEUTIC EXERCISE: (10 minutes):  Exercises per grid below to improve mobility, strength and coordination. Required minimal visual, verbal and manual cues to promote proper body alignment, promote proper body posture and promote proper body mechanics. Progressed resistance, range, repetitions and complexity of movement as indicated.    Date:  2021   Activity/Exercise Parameters   Wrist extensor stretch  5 x 30 sec   B shoulder ER 3 x 10   Scapular setting 2 x 10   Pt ed Scapular position   Pronation/Supination (not today) With red band today x 15    Prone on elbows (not today) Scapular stabilization x 5 min   Sleeper stretch Left side lie 5 x 10 sec hold with self contract relax   First rib mobilization With strap x 5 min   Axial elongation With w-retractions x 10     MANUAL THERAPY: (30 minutes): Joint mobilization and Soft tissue mobilization was utilized and necessary because of the patient's restricted joint motion, loss of articular motion and restricted motion of soft tissue. - superficial fascial mobilization of proximal wrist extensors immediately distal to the lateral epicondyle   - Manual stretch of the extensors into wrist flexion and radial deviation with full elbow extension   - 1st rib mobilization on Left  -Suboccipital release right with axial elongation   - soft tissue mobilization around clavicle, subscapularis, Latissimus Dorsi, wrist extensors, biceps, scalenes. - posterior/inferior glenohumeral glides   - radial glides into wrist flexion and supination   - Olecranon mobilization into trochlear notch   - scapular post depression/anterior elevation with post depression hold after contract/relax  -Cranial suture mobilization right side frontal and sagittal sutures    Modalities:   Ultrasound (0 min): 50% at 2 mHz for 8 min to right epicondyle. Treatment/Session Summary:    · Response to Treatment:  Pt symptoms are improving with time and mobilizations. Worked on cervical stability and elongation  · Communication/Consultation:  None today  · Equipment provided today:  None today  · Recommendations/Intent for next treatment session: Next visit will focus on shoulder ROM and function and isometric strengthening of wrist extensors.     Total Treatment Billable Duration:  40 minutes  PT Patient Time In/Time Out  Time In: 1120  Time Out: [address] PT    Future Appointments   Date Time Provider Jono Butcher   11/30/2021  1:00 PM Roxi Davey PT [facility]   1/28/2022  9:20 AM Isreal Ward MD [id number] [place]   2/3/2022 11:00 AM Zachariah Schofield NP [facility] PP

## 2021-12-08 ENCOUNTER — HOSPITAL ENCOUNTER (OUTPATIENT)
Dept: PHYSICAL THERAPY | Age: 61
Discharge: HOME | End: 2021-12-08
Payer: MEDICARE

## 2021-12-08 PROCEDURE — 97140 MANUAL THERAPY 1/> REGIONS: CPT

## 2021-12-08 PROCEDURE — 97110 THERAPEUTIC EXERCISES: CPT

## 2021-12-08 NOTE — PROGRESS NOTES
Nataliya Patel  : 1960  Primary: Godfrey Murray Medicare Advantage  Secondary:  [address]  at [facility]  [address] [place] [address]  [phone]   [phone]      OUTPATIENT PHYSICAL THERAPY: Daily Treatment Note 2021  Visit Count:  23    ICD-10: Treatment Diagnosis:   Lateral Epicondylitis, Left M77.12  Pain in joint, Left Elbow M25.522  Pain in joint, left shoulder, M25.512  Precautions/Allergies:   Latex, natural rubber; Dexamethasone; and Other medication   TREATMENT PLAN:  Effective Dates: 10/5/2021 TO 22). Frequency/Duration: 1 time every month for 3 months MEDICAL/REFERRING DIAGNOSIS:  Lateral epicondylitis, left elbow [M77.12]  DATE OF ONSET: 2020  REFERRING PHYSICIAN:  Dr. Kael Domingo MD Orders: Evaluate and Treat  Return MD Appointment: TBD       Pre-treatment Symptoms/Complaints:  Pt reports that she has an appointment with Dr. Andrea Cotter in February, but in the meantime wanted her to follow up with POA who has not gotten back to her. She has good days and bad with the cervical spine. Pain: Initial: Pain Intensity 1: 4/10 Post Session:  1/10   Medications Last Reviewed:  2021  Updated Objective Findings:  Some suboccipital tightness right greater than left today. TREATMENT:     THERAPEUTIC EXERCISE: (10 minutes):  Exercises per grid below to improve mobility, strength and coordination. Required minimal visual, verbal and manual cues to promote proper body alignment, promote proper body posture and promote proper body mechanics. Progressed resistance, range, repetitions and complexity of movement as indicated.    Date:  2021   Activity/Exercise Parameters   Wrist extensor stretch (not today) 5 x 30 sec   B shoulder ER 3 x 10   Scapular setting 2 x 10   Pt ed Scapular position   Pronation/Supination (not today) With red band today x 15    Prone on elbows (not today) Scapular stabilization x 5 min   Sleeper stretch Left side lie 5 x 10 sec hold with self contract relax   First rib mobilization With strap x 5 min   Axial elongation With w-retractions x 10     MANUAL THERAPY: (28 minutes): Joint mobilization and Soft tissue mobilization was utilized and necessary because of the patient's restricted joint motion, loss of articular motion and restricted motion of soft tissue.   -Suboccipital release right with axial elongation   - soft tissue mobilization around clavicle, subscapularis, Latissimus Dorsi, wrist extensors, biceps, scalenes. - posterior/inferior glenohumeral glides   - scapular post depression/anterior elevation with post depression hold after contract/relax  -Cranial suture mobilization right side frontal and sagittal sutures    Modalities:   Ultrasound (0 min): 50% at 2 mHz for 8 min to right epicondyle. Treatment/Session Summary:    · Response to Treatment:  Pt symptoms are improving with time and mobilizations. She continues to work on postural exercises at this time  · Communication/Consultation:  None today  · Equipment provided today:  None today  · Recommendations/Intent for next treatment session: Next visit will focus on shoulder ROM and function and isometric strengthening of wrist extensors.     Total Treatment Billable Duration:  38 minutes  PT Patient Time In/Time Out  Time In: 4329  Time Out: 0900  Dat Howard PT    Future Appointments   Date Time Provider Jono Butcher   1/4/2022  9:00 AM Bairon Riley PT [facility]   1/28/2022  9:20 AM Morgan Veronica MD [id number]   2/25/2022  2:20 PM Karin Moreland NP [facility]

## 2022-01-04 ENCOUNTER — HOSPITAL ENCOUNTER (OUTPATIENT)
Dept: PHYSICAL THERAPY | Age: 62
Discharge: HOME | End: 2022-01-04
Payer: MEDICARE

## 2022-01-04 PROCEDURE — 97140 MANUAL THERAPY 1/> REGIONS: CPT

## 2022-01-04 NOTE — PROGRESS NOTES
Ileana Patel  : 1960  Primary: Evelyn Olson Medicare Advantage  Secondary:  [address]  at [address]  [phone]   [phone]      OUTPATIENT PHYSICAL THERAPY: Daily Treatment Note 2022  Visit Count:  24    ICD-10: Treatment Diagnosis:   Lateral Epicondylitis, Left M77.12  Pain in joint, Left Elbow M25.522  Pain in joint, left shoulder, M25.512  Cervicalgia, M54.2  Precautions/Allergies:   Latex, natural rubber; Dexamethasone; and Other medication   TREATMENT PLAN:  Effective Dates: 22 TO 22. Frequency/Duration: 1 time every month for 3 months MEDICAL/REFERRING DIAGNOSIS:  Lateral epicondylitis, left elbow [M77.12]  DATE OF ONSET: 2020  REFERRING PHYSICIAN:  Dr. Mauricio Nunez MD Orders: Evaluate and Treat  Return MD Appointment: TBD       Pre-treatment Symptoms/Complaints:  Pt reports that the neck is still the biggest issue with waking up with more pain. Pain: Initial: Pain Intensity 1: 4/10 Post Session:  1/10   Medications Last Reviewed:  2022  Updated Objective Findings:  Some suboccipital tightness right greater than left today. TREATMENT:     THERAPEUTIC EXERCISE: (0 minutes):  Exercises per grid below to improve mobility, strength and coordination. Required minimal visual, verbal and manual cues to promote proper body alignment, promote proper body posture and promote proper body mechanics. Progressed resistance, range, repetitions and complexity of movement as indicated.    Date:  2022   Activity/Exercise Parameters   Wrist extensor stretch (not today) 5 x 30 sec   B shoulder ER 3 x 10   Scapular setting 2 x 10   Pt ed Scapular position   Pronation/Supination (not today) With red band today x 15    Prone on elbows (not today) Scapular stabilization x 5 min   Sleeper stretch Left side lie 5 x 10 sec hold with self contract relax   First rib mobilization With strap x 5 min   Axial elongation With w-retractions x 10     MANUAL THERAPY: (55 minutes): Joint mobilization and Soft tissue mobilization was utilized and necessary because of the patient's restricted joint motion, loss of articular motion and restricted motion of soft tissue.   -Suboccipital release right with axial elongation   - soft tissue mobilization around clavicle, subscapularis, Latissimus Dorsi, wrist extensors, biceps, scalenes. - posterior/inferior glenohumeral glides   - scapular post depression/anterior elevation with post depression hold after contract/relax  -Cranial suture mobilization right side frontal and sagittal sutures    Modalities:   Ultrasound (0 min): 50% at 2 mHz for 8 min to right epicondyle. Treatment/Session Summary:    · Response to Treatment:  Pt symptoms are improving with time and mobilizations. Continue to progress cervical mobility  · Communication/Consultation:  None today  · Equipment provided today:  None today  · Recommendations/Intent for next treatment session: Next visit will focus on shoulder ROM and function and isometric strengthening of wrist extensors.     Total Treatment Billable Duration:  55 minutes  PT Patient Time In/Time Out  Time In: 1170  Time Out: Margie Hurley, PT    Future Appointments   Date Time Provider Jono Butcher   1/28/2022  9:20 AM Raulito Horner.MD [facility]   2/1/2022 11:00 AM Sasha Collier, PT [facility]   2/25/2022  2:20 PM Gisele Rodas NP [facility]

## 2022-01-04 NOTE — THERAPY RECERTIFICATION
Atul Patel  : 1960  Primary: Lisa Sherman Medicare Advantage  Secondary:  [address]  at [address]  [phone]   [phone]         OUTPATIENT PHYSICAL THERAPY:Recertification    ICD-10: Treatment Diagnosis:   Lateral Epicondylitis, Left M77.12  Pain in joint, Left Elbow M25.522  Pain in joint, left shoulder, M25.512  Cervicalgia, M54.2  Precautions/Allergies:   Latex, natural rubber; Dexamethasone; and Other medication   TREATMENT PLAN:  Effective Dates: 22 TO 22. Frequency/Duration: 1 time every month for 3 months MEDICAL/REFERRING DIAGNOSIS:  Lateral epicondylitis, left elbow [M77.12]  DATE OF ONSET: 2020  REFERRING PHYSICIAN:Dr. Sebastian Brantley MD Orders: Evaluate and Treat  Return MD Appointment: JERI   Merrill Correa has been seen for 24 visits from 20 to 2022 for left arm and elbow pain. Patient has performed therapeutic exercises, activities, and had manual therapy to increased strength, ROM and function. Patient has also used modalities for pain control in order to increase function. Patient has shown an increase in function per the DASH with scores of 19/55  Impairments. Her NDI score shows 20/50. She shows continued forward head posture that affects her neck tightness and stiffness. She is progressing with her UE function, but still needs treatment on the cervical dysfunction. Dorothy Joshi, PT, DPT, OCS, CFMT          INITIAL ASSESSMENT:  Ms. Erasmo Ortiz presents to the clinic with pain, strength, and functional deficits in the left elbow with signs and symptoms consistent with lateral epicondylitis. Pt shoulder ROM and functional impairments play a role in the elbow compensating to continue performing daily function and activities. Pt has marked weakness between left and right  strength that could be caused by a radial nerve compression at the elbow.  Pt has Lyme's disease which can affect treatment prognosis. Pt needs skilled PT to help address impairments and improve function as we progress towards goals. Aria Mack, PT, DPT, OCS, CFMT     PROBLEM LIST (Impacting functional limitations):  1. Decreased Strength  2. Decreased ADL/Functional Activities  3. Increased Pain  4. Decreased Activity Tolerance  5. Decreased Flexibility/Joint Mobility INTERVENTIONS PLANNED: (Treatment may consist of any combination of the following)  1. Cryotherapy  2. Electrical Stimulation  3. Home Exercise Program (HEP)  4. Manual Therapy  5. Neuromuscular Re-education/Strengthening  6. Range of Motion (ROM)  7. Therapeutic Activites  8. Therapeutic Exercise/Strengthening     GOALS: (Goals have been discussed and agreed upon with patient.)  Discharge Goals: Time Frame: 12 weeks  1. Pt will demonstrate independence with final HEP (ongoing)  2. Pt will demonstrate ability to  a bottle with affect UE without pain to assist with ADLs (MET-7/6/21)  3. Pt will report ability to play guitar without pain in the lateral epicondyle (MET-1/4/22)  4. Pt will report ability to perform yoga routines without interruption from forearm pain (ongoing with improvement)  5. Pt will improve DASH by 10 points to improve daily function(MET-10/5/21)  6. NEW GOAL:  7. Patient will show a greater than 5 point decrease on the NDI in order to show an increase in function. OUTCOME MEASURE:   Tool Used: Disabilities of the Arm, Shoulder and Hand (DASH) Questionnaire - Quick Version  Score:  Initial: 39/55  Most Recent: 19/55 (Date: 1/4/22)   Interpretation of Score: The DASH is designed to measure the activities of daily living in person's with upper extremity dysfunction or pain. Each section is scored on a 1-5 scale, 5 representing the greatest disability. The scores of each section are added together for a total score of 55.     Tool Used: Neck Disability Index (NDI)  Score:  Initial: 21/50  Most Recent: 20/50 (Date: 1/4/22 )   Interpretation of Score: The Neck Disability Index is a revised form of the Oswestry Low Back Pain Index and is designed to measure the activities of daily living in person's with neck pain. Each section is scored on a 0-5 scale, 5 representing the greatest disability. The scores of each section are added together for a total score of 50. MEDICAL NECESSITY:   · Patient is expected to demonstrate progress in strength, range of motion, coordination and functional technique to improve daily function and recreational tasks. · Patient demonstrates good rehab potential due to higher previous functional level. REASON FOR SERVICES/OTHER COMMENTS:  · Patient requires therapy to improve on limitations listed. Total Duration:  PT Patient Time In/Time Out  Time In: 9398  Time Out: 7908    Rehabilitation Potential For Stated Goals: Good  Regarding Rafe Rinne Beezer's therapy, I certify that the treatment plan above will be carried out by a therapist or under their direction. Thank you for this referral,  Charles Rader, PT     Referring Physician Signature: Dr. Bladimir Pisano _______________________________ Date _____________     PAIN/SUBJECTIVE:   Initial: Pain Intensity 1: 4 at worst Post Session:  3/10   HISTORY:   History of Injury/Illness (Reason for Referral):  Pt arrived to the clinic with pain in the left elbow. Pt states that she has been dealing with this pain since March of 2020, but did not know if it was just a result of her lyme's disease. Pt states that during the pandemic, her activity in yoga and playing the guitar increased considerably, which could have flared the condition. Pt reports that she is in a considerable amount of pain when she plays guitar or when performing closed chain yoga exercises. Pt states that she is R hand dominant, but sleeps on her L side which can increase symptoms.  Pt also states that she has fallen on her L shoulder in the past. Pt wants to return to PLOF without pain.  Past Medical History/Comorbidities:   Ms. Javier James  has a past medical history of Anemia, Arthritis, Cholecystitis (8/28/2014), Chronic pain, Glaucoma, History of skin cancer, Multiple thyroid nodules, Obstructive sleep apnea, Osteoarthritis of lumbar spine, Osteopenia, and Tennis elbow. She has no past medical history of Adverse effect of anesthesia, Aneurysm ([address] Asthma, Autoimmune disease ([address] CAD (coronary artery disease), Chronic kidney disease, Chronic obstructive pulmonary disease ([address] Coagulation disorder ([address] Diabetes ([address] Difficult intubation, Endocarditis, GERD (gastroesophageal reflux disease), Heart failure ([address] Hypertension, Liver disease, Malignant hyperthermia due to anesthesia, Morbid obesity ([address] Nicotine vapor product user, Non-nicotine vapor product user, Pseudocholinesterase deficiency, Psychiatric disorder, PUD (peptic ulcer disease), Rheumatic fever, Seizures ([address] Stroke ([address] or Thromboembolus ([address] Ms. Javier James  has a past surgical history that includes hx wisdom teeth extraction; hx appendectomy (age 9); hx rhinoplasty (1990); hx colonoscopy; hx cholecystectomy (2014); hx vascular access; hx maxillofacial; hx septoplasty (02/01/2018); and hx cataract removal (Bilateral, 12/2019).   Social History/Living Environment:       Social History     Socioeconomic History    Marital status: SINGLE     Spouse name: Not on file    Number of children: Not on file    Years of education: Not on file    Highest education level: Not on file   Occupational History    Occupation: not currently employed   Tobacco Use    Smoking status: Never Smoker    Smokeless tobacco: Never Used   Substance and Sexual Activity    Alcohol use: No     Alcohol/week: 0.0 standard drinks    Drug use: No     Comment: none    Sexual activity: Not on file   Other Topics Concern    Not on file   Social History Narrative    Has a Master's degree    Lives alone     Social Determinants of Health Financial Resource Strain: Low Risk     Difficulty of Paying Living Expenses: Not very hard   Food Insecurity: No Food Insecurity    Worried About Running Out of Food in the Last Year: Never true    Casandra of Food in the Last Year: Never true   Transportation Needs: No Transportation Needs    Lack of Transportation (Medical): No    Lack of Transportation (Non-Medical): No   Physical Activity:     Days of Exercise per Week: Not on file    Minutes of Exercise per Session: Not on file   Stress:     Feeling of Stress : Not on file   Social Connections:     Frequency of Communication with Friends and Family: Not on file    Frequency of Social Gatherings with Friends and Family: Not on file    Attends [employer] Services: Not on file    Active Member of [address] [place] [address] or Organizations: Not on file    Attends Club or Organization Meetings: Not on file    Marital Status: Not on file   Intimate Partner Violence:     Fear of Current or Ex-Partner: Not on file    Emotionally Abused: Not on file    Physically Abused: Not on file    Sexually Abused: Not on file   Housing Stability:     Unable to Pay for Housing in the Last Year: Not on file    Number of [place] in the Last Year: Not on file    Unstable Housing in the Last Year: Not on file       Prior Level of Function/Work/Activity:  Independent with activity without pain  Out of work   Dominant Side:         RIGHT   Ambulatory/Rehab Services H2 Model Falls Risk Assessment   Risk Factors:       No Risk Factors Identified Ability to Rise from Chair:       (0)  Ability to rise in a single movement   Falls Prevention Plan:       No modifications necessary   Total: (5 or greater = High Risk): 0   ©2010 [facility] [address] [place] [address] States Patent #[id number].  Federal Law prohibits the replication, distribution or use without written permission from [facility]   Current Medications:       Current Outpatient Medications:    nystatin (MYCOSTATIN) 100,000 unit/mL suspension, Take  by mouth., Disp: , Rfl:     OTHER, 1 Capsule by Not Applicable route. Levocarnitine HCL, Disp: , Rfl:     ammonium lactate (LAC-HYDRIN) 12 % lotion, , Disp: , Rfl:     clindamycin (CLEOCIN T) 1 % lotion, , Disp: , Rfl:     montelukast (Singulair) 10 mg tablet, Take 10 mg by mouth nightly., Disp: , Rfl:     fexofenadine (Allegra Allergy) 180 mg tablet, Take  by mouth., Disp: , Rfl:     L-ORNITHINE PO, Take 100 g by mouth daily. L Aspartate 3 full teaspoons daily, Disp: , Rfl:     cholecalciferol, vitamin D3, (VITAMIN D3 PO), 5,000 Units. , Disp: , Rfl:     OTHER, 500 mg. Lactoferrin, Disp: , Rfl:     magnesium 200 mg tab, 200 mg. 3 caps, Disp: , Rfl:     MANGANESE PO, 10 mg daily. , Disp: , Rfl:     ascorbic acid, vitamin C, (Vitamin C) 1,000 mg tablet, , Disp: , Rfl:     calcium carbonate (CALCIUM 300 PO), , Disp: , Rfl:     Cyanocobalamin-Cobamamide (B12) 5,000-100 mcg lozg, , Disp: , Rfl:     OTHER, 1,800 mcg two (2) times a day. Methyl folate, Disp: , Rfl:     MOLYBDENUM, BULK,, 1,500 mcg daily. , Disp: , Rfl:     OTHER, 1,100 mg two (2) times a day. Nattokinese, Disp: , Rfl:     OTHER, Phospholipids, Disp: , Rfl:     OTHER, 500 mg.  Quercitin, Disp: , Rfl:     OTHER, Zinc/joan 15/1, Disp: , Rfl:     famotidine (PEPCID) 10 mg tablet, Take 20 mg by mouth two (2) times a day., Disp: , Rfl:     Lactobac 40-Bifido 3-S.thermop (Probiotic) 100 billion cell cap, , Disp: , Rfl:     levocarnitine HCl (ACETYL-L-CARNITINE), , Disp: , Rfl:     melatonin 1 mg subl, , Disp: , Rfl:     minocycline (Minocin) 100 mg capsule, , Disp: , Rfl:     fish oil-omega-3-DHA-EPA (Fish Oil) 300-1,000 mg capsule, , Disp: , Rfl:     organ concentrates (ADRENAL PO), , Disp: , Rfl:     prasterone, DHEA, (DHEA PO), , Disp: , Rfl:     resveratroL 250 mg cap, , Disp: , Rfl:     Saccharomyces boulardii (FLORASTOR) 250 mg capsule, , Disp: , Rfl:     Taurine 500 mg cap, , Disp: , Rfl:     tinidazole (TINDAMAX) 500 mg tablet, , Disp: , Rfl:     CoQ10, Ubiquinol, 200 mg cap, , Disp: , Rfl:     vitamin K2 100 mcg cap, , Disp: , Rfl:     Xylitol, Bulk, powd, , Disp: , Rfl:     tretinoin (RETIN-A) 0.05 % topical cream, APPLY THINLY TO FACE EVERY EVENING AS TOLERATED, Disp: , Rfl:     hydrOXYzine HCL (ATARAX) 50 mg tablet, TAKE 1/2 TO 1 TABLET BY MOUTH 30 MINUTES PRIOR TO SLEEP, Disp: , Rfl:     hydroquinone (ESOTERICA, MELQUIN) 4 % topical cream, APPLY TO DARK SPOTS 2 TIMES A DAY FOR 3 MONTHS, Disp: , Rfl:     estradioL (ESTRACE) 0.01 % (0.1 mg/gram) vaginal cream, Apply pea-sized amount (0.5 g) nightly x 2 weeks, then twice weekly, Disp: , Rfl:     timolol (TIMOPTIC) 0.5 % ophthalmic solution, INSTILL 1 DROP IN BOTH EYES DAILY; use day of surgery, Disp: , Rfl: 2    multivitamin (ONE A DAY) tablet, Take 1 Tab by mouth. Take 1 tab twice weekly, Disp: , Rfl:     mirtazapine (REMERON) 15 mg tablet, Take 3.2 mg by mouth nightly. Takes for pain and sleep, Disp: , Rfl:     rifabutin (MYCOBUTIN) 150 mg capsule, Take 1 Capsule by mouth two (2) times a day. 150mg MWF week 1 and MTW week 2, Disp: , Rfl:     gabapentin (NEURONTIN) 100 mg capsule, Take 300 mg by mouth nightly., Disp: , Rfl: 6    lorazepam (ATIVAN) 1 mg tablet, Take 0.25 mg by mouth nightly.  Indications: difficulty sleeping, Disp: , Rfl:    Date Last Reviewed:  1/4/2022   Number of Personal Factors/Comorbidities that affect the Plan of Care: 1-2: MODERATE COMPLEXITY   EXAMINATION:   Observation/Orthostatic Postural Assessment:          Pt has rounded shoulders and forward head lean        Pt guards L arm use to prevent pain        Slight right cervical tilt in sitting/laying  -Increased forward head and rounded shoulders posture  Palpation:          Increased tone on proximal wrist extensors        Superficial fascial tightness on wrist extensors distal to lateral epicondyle        TTP on superior border of lateral epicondyle near joint line        Crepitus in glenohumeral joint during shoulder flexion/external rotation        Decreased glenohumeral inferior/posterior glide  Cervical-tightness in paraspinals, suboccipitals, and SCM  ROM:          L shoulder ROM:   FE: 160 deg (not smooth) and some pain  ER: 70 deg        L elbow ROM          Cervical Rotation: 75% left and 80% right  Strength:          Wrist MMT:  Flexion: 5/5 B  Extension: 5/5 R; 3/5 L (with pain)  UD: 5/5 B  RD: 5/5 B (with pain on L)        Shoulder MMT:  L shoulder ER: 4/5 with slight pain, otherwise all other functions WNL         Strength Dynamometer (pounds)  R: 25 pounds  L: 10 pounds     Body Structures Involved:  1. Bones  2. Joints  3. Muscles  4. Ligaments Body Functions Affected:  1. Neuromusculoskeletal  2. Movement Related Activities and Participation Affected:  1. General Tasks and Demands  2. Self Care  3. Interpersonal Interactions and Relationships  4.  Community, Social and [place]   Number of elements (examined above) that affect the Plan of Care: 4+: HIGH COMPLEXITY   CLINICAL PRESENTATION:   Presentation: Stable and uncomplicated: LOW COMPLEXITY   CLINICAL DECISION MAKING:   Use of outcome tool(s) and clinical judgement create a POC that gives a: Clear prediction of patient's progress: LOW COMPLEXITY

## 2022-01-28 PROBLEM — E78.5 HYPERLIPIDEMIA: Status: ACTIVE | Noted: 2022-01-28

## 2022-01-28 PROBLEM — Z12.11 SCREENING FOR COLON CANCER: Status: ACTIVE | Noted: 2022-01-28

## 2022-02-01 ENCOUNTER — HOSPITAL ENCOUNTER (OUTPATIENT)
Dept: PHYSICAL THERAPY | Age: 62
Discharge: HOME | End: 2022-02-01
Payer: MEDICARE

## 2022-02-01 PROCEDURE — 97110 THERAPEUTIC EXERCISES: CPT

## 2022-02-01 PROCEDURE — 97140 MANUAL THERAPY 1/> REGIONS: CPT

## 2022-02-01 NOTE — PROGRESS NOTES
Adela Conteh Viridianabonita  : 1960  Primary: [employer] Medicare Advantage  Secondary:  [address]  at [address]  [phone]   [phone]      OUTPATIENT PHYSICAL THERAPY: Daily Treatment Note 2022  Visit Count:  25    ICD-10: Treatment Diagnosis:   Lateral Epicondylitis, Left M77.12  Pain in joint, Left Elbow M25.522  Pain in joint, left shoulder, M25.512  Cervicalgia, M54.2  Precautions/Allergies:   Latex, natural rubber; Dexamethasone; and Other medication   TREATMENT PLAN:  Effective Dates: 22 TO 22. Frequency/Duration: 1 time every month for 3 months MEDICAL/REFERRING DIAGNOSIS:  Lateral epicondylitis, left elbow [M77.12]  DATE OF ONSET: 2020  REFERRING PHYSICIAN:  Dr. Venkatesh Pereira MD Orders: Evaluate and Treat  Return MD Appointment: TBD       Pre-treatment Symptoms/Complaints:  Pt reports that the neck is about the same and she has an appointment with Dr. Timoteo Flanagan soon. She is feeling some pain with walking lately in the left hip for some reason now  Pain: Initial: Pain Intensity 1: 4/10 Post Session:  1/10   Medications Last Reviewed:  2022  Updated Objective Findings:  Some suboccipital tightness right greater than left today. TREATMENT:     THERAPEUTIC EXERCISE: (10 minutes):  Exercises per grid below to improve mobility, strength and coordination. Required minimal visual, verbal and manual cues to promote proper body alignment, promote proper body posture and promote proper body mechanics. Progressed resistance, range, repetitions and complexity of movement as indicated.    Date:  2022   Activity/Exercise Parameters   Wrist extensor stretch (not today) 5 x 30 sec   B shoulder ER 3 x 10   Scapular setting 2 x 10   Pt ed Scapular position   Pronation/Supination (not today) With red band today x 15    Prone on elbows (not today)    Sleeper stretch Left side lie 5 x 10 sec hold with self contract relax First rib mobilization reviewed   Axial elongation With w-retractions x 10   Hip stretches Piriformis and gluts x 3 min     MANUAL THERAPY: (45 minutes): Joint mobilization and Soft tissue mobilization was utilized and necessary because of the patient's restricted joint motion, loss of articular motion and restricted motion of soft tissue.   -Suboccipital release right with axial elongation   - soft tissue mobilization around clavicle, subscapularis, Latissimus Dorsi, wrist extensors, biceps, scalenes. - posterior/inferior glenohumeral glides   - scapular post depression/anterior elevation with post depression hold after contract/relax  -Cranial suture mobilization right side frontal and sagittal sutures    Modalities:   Ultrasound (0 min): 50% at 2 mHz for 8 min to right epicondyle. Treatment/Session Summary:    · Response to Treatment:  Pt symptoms are improving with time and mobilizations. Follow up in one month  · Communication/Consultation:  None today  · Equipment provided today:  None today  · Recommendations/Intent for next treatment session: Next visit will focus on shoulder ROM and function and isometric strengthening of wrist extensors.     Total Treatment Billable Duration:  55 minutes  PT Patient Time In/Time Out  Time In: 1104  Time Out: [address] PT    Future Appointments   Date Time Provider Jono Butcher   2/25/2022  2:20 PM Adele Wong NP [facility]   3/1/2022 11:00 AM Cordelia Cowan PT [facility]   7/28/2022 10:20 AM MD Madeline Escamilla

## 2022-02-27 PROBLEM — Z12.11 SCREENING FOR COLON CANCER: Status: RESOLVED | Noted: 2022-01-28 | Resolved: 2022-02-27

## 2022-03-01 ENCOUNTER — HOSPITAL ENCOUNTER (OUTPATIENT)
Dept: PHYSICAL THERAPY | Age: 62
Discharge: HOME | End: 2022-03-01
Payer: MEDICARE

## 2022-03-01 PROCEDURE — 97110 THERAPEUTIC EXERCISES: CPT

## 2022-03-01 PROCEDURE — 97140 MANUAL THERAPY 1/> REGIONS: CPT

## 2022-03-01 NOTE — PROGRESS NOTES
Adela Patel  : 1960  Primary: Ron [employer] Medicare Advantage  Secondary:  [address]  at [address]  [phone]   [phone]      OUTPATIENT PHYSICAL THERAPY: Daily Treatment Note 3/1/2022  Visit Count:  26    ICD-10: Treatment Diagnosis:   Lateral Epicondylitis, Left M77.12  Pain in joint, Left Elbow M25.522  Pain in joint, left shoulder, M25.512  Cervicalgia, M54.2  Precautions/Allergies:   Latex, natural rubber; Dexamethasone; and Other medication   TREATMENT PLAN:  Effective Dates: 22 TO 22. Frequency/Duration: 1 time every month for 3 months MEDICAL/REFERRING DIAGNOSIS:  Lateral epicondylitis, left elbow [M77.12]  DATE OF ONSET: 2020  REFERRING PHYSICIAN:  Dr. Venkatesh Pereira MD Orders: Evaluate and Treat  Return MD Appointment: TBD       Pre-treatment Symptoms/Complaints:  Pt reports that the neck is about the same  And she did see Dr. VILLEGAS [facility] and also Dr. Florentino Samayoa about her neck. Per her xrays the doctors report there doesn't look to be anything surgical and that maybe she is having migraine type pain  Pain: Initial: Pain Intensity 1: 10 Post Session:  1/10   Medications Last Reviewed:  3/1/2022  Updated Objective Findings:  Some suboccipital tightness right greater than left today. TREATMENT:     THERAPEUTIC EXERCISE: (10 minutes):  Exercises per grid below to improve mobility, strength and coordination. Required minimal visual, verbal and manual cues to promote proper body alignment, promote proper body posture and promote proper body mechanics. Progressed resistance, range, repetitions and complexity of movement as indicated.    Date:  3/1/2022   Activity/Exercise Parameters   Wrist extensor stretch (not today) 5 x 30 sec   B shoulder ER 3 x 10   Scapular setting 2 x 10   Pt ed Scapular position   Pronation/Supination (not today) With red band today x 15    Prone on elbows (not today)    Sleeper stretch Left side lie 5 x 10 sec hold with self contract relax   First rib mobilization reviewed   Axial elongation With w-retractions x 10   Hip stretches Piriformis and gluts x 3 min and 1 min for hip flexors     MANUAL THERAPY: (45 minutes): Joint mobilization and Soft tissue mobilization was utilized and necessary because of the patient's restricted joint motion, loss of articular motion and restricted motion of soft tissue.   -Suboccipital release right with axial elongation   - soft tissue mobilization around clavicle, subscapularis, Latissimus Dorsi, wrist extensors, biceps, scalenes. - posterior/inferior glenohumeral glides   - scapular post depression/anterior elevation with post depression hold after contract/relax  -Cranial suture mobilization right side frontal and sagittal sutures    Modalities:   Ultrasound (0 min): 50% at 2 mHz for 8 min to right epicondyle. Treatment/Session Summary:    · Response to Treatment:  Pt symptoms are improving with time and mobilizations. She shows better control of posture and position, but still having pain from time to time  · Communication/Consultation:  None today  · Equipment provided today:  None today  · Recommendations/Intent for next treatment session: Next visit will focus on shoulder ROM and function and isometric strengthening of wrist extensors.     Total Treatment Billable Duration:  55 minutes  PT Patient Time In/Time Out  Time In: 1101  Time Out: [address] PT    Future Appointments   Date Time Provider Jono Butcher   3/2/2022 11:30 AM Altaf Singer MD [facility]   3/31/2022 11:00 AM Jamari Lorenzana PT [facility]   7/28/2022 10:20 AM MD Yazan Israel

## 2022-03-18 PROBLEM — G25.81 RESTLESS LEGS: Status: ACTIVE | Noted: 2021-09-16

## 2022-03-18 PROBLEM — E55.9 VITAMIN D DEFICIENCY: Status: ACTIVE | Noted: 2021-01-21

## 2022-03-18 PROBLEM — M54.12 CERVICAL RADICULOPATHY: Status: ACTIVE | Noted: 2021-10-31

## 2022-03-18 PROBLEM — G47.33 OSA ON CPAP: Status: ACTIVE | Noted: 2021-10-31

## 2022-03-18 PROBLEM — G25.81 RESTLESS LEG SYNDROME: Status: ACTIVE | Noted: 2021-10-31

## 2022-03-18 PROBLEM — M50.30 DDD (DEGENERATIVE DISC DISEASE), CERVICAL: Status: ACTIVE | Noted: 2021-10-31

## 2022-03-18 PROBLEM — Z99.89 OSA ON CPAP: Status: ACTIVE | Noted: 2021-10-31

## 2022-03-19 PROBLEM — F51.01 PRIMARY INSOMNIA: Status: ACTIVE | Noted: 2020-12-13

## 2022-03-19 PROBLEM — M77.12 LEFT TENNIS ELBOW: Status: ACTIVE | Noted: 2020-12-13

## 2022-03-19 PROBLEM — G47.34 SLEEP-RELATED HYPOXIA: Status: ACTIVE | Noted: 2021-09-16

## 2022-03-19 PROBLEM — N39.0 URINARY TRACT INFECTION WITHOUT HEMATURIA: Status: ACTIVE | Noted: 2020-12-18

## 2022-03-19 PROBLEM — M85.80 OSTEOPENIA DETERMINED BY X-RAY: Status: ACTIVE | Noted: 2017-06-08

## 2022-03-19 PROBLEM — R29.818 SUSPECTED SLEEP APNEA: Status: ACTIVE | Noted: 2021-07-31

## 2022-03-19 PROBLEM — M54.2 CERVICALGIA: Status: ACTIVE | Noted: 2021-10-31

## 2022-03-19 PROBLEM — Z98.890 S/P SINUS SURGERY: Status: ACTIVE | Noted: 2018-02-28

## 2022-03-19 PROBLEM — Z00.00 MEDICARE ANNUAL WELLNESS VISIT, SUBSEQUENT: Status: ACTIVE | Noted: 2020-12-11

## 2022-03-19 PROBLEM — R09.02 HYPOXEMIA: Status: ACTIVE | Noted: 2021-10-25

## 2022-03-19 PROBLEM — E78.5 HYPERLIPIDEMIA: Status: ACTIVE | Noted: 2022-01-28

## 2022-03-19 PROBLEM — M79.10 MYALGIA: Status: ACTIVE | Noted: 2020-12-13

## 2022-03-19 PROBLEM — J32.9 SINUSITIS: Status: ACTIVE | Noted: 2021-07-09

## 2022-03-19 PROBLEM — M85.80 OSTEOPENIA: Status: ACTIVE | Noted: 2019-05-07

## 2022-03-19 PROBLEM — J30.9 ALLERGIC RHINITIS DUE TO ALLERGEN: Status: ACTIVE | Noted: 2021-07-31

## 2022-03-19 PROBLEM — R30.0 BURNING WITH URINATION: Status: ACTIVE | Noted: 2020-12-18

## 2022-03-19 PROBLEM — G47.61 PLMD (PERIODIC LIMB MOVEMENT DISORDER): Status: ACTIVE | Noted: 2018-02-28

## 2022-03-19 PROBLEM — R00.1 BRADYCARDIA: Status: ACTIVE | Noted: 2021-07-31

## 2022-03-19 PROBLEM — M47.816 OSTEOARTHRITIS OF LUMBAR SPINE: Status: ACTIVE | Noted: 2019-05-07

## 2022-03-19 PROBLEM — J32.9 RECURRENT SINUS INFECTIONS: Status: ACTIVE | Noted: 2021-07-29

## 2022-03-19 PROBLEM — R00.0 TACHYCARDIA: Status: ACTIVE | Noted: 2020-12-13

## 2022-03-19 PROBLEM — R53.82 CHRONIC FATIGUE: Status: ACTIVE | Noted: 2020-01-13

## 2022-03-20 PROBLEM — J02.9 PHARYNGITIS: Status: ACTIVE | Noted: 2021-07-09

## 2022-03-20 PROBLEM — R00.2 PALPITATIONS: Status: ACTIVE | Noted: 2020-12-11

## 2022-03-20 PROBLEM — Z98.890 HX OF SINUS SURGERY: Status: ACTIVE | Noted: 2021-07-09

## 2022-03-20 PROBLEM — R40.0 DAYTIME SLEEPINESS: Status: ACTIVE | Noted: 2018-02-28

## 2022-03-31 ENCOUNTER — HOSPITAL ENCOUNTER (OUTPATIENT)
Dept: PHYSICAL THERAPY | Age: 62
Discharge: HOME | End: 2022-03-31
Payer: MEDICARE

## 2022-03-31 PROCEDURE — 97140 MANUAL THERAPY 1/> REGIONS: CPT

## 2022-03-31 NOTE — THERAPY RECERTIFICATION
Teresa Patel  : 1960  Primary: Maria Acalista Ramirezjean Medicare Advantage  Secondary:  [address]  at [facility]  [address] [place] [address]  [phone]   [phone]         OUTPATIENT PHYSICAL THERAPY:Recertification [id number]   ICD-10: Treatment Diagnosis:   Lateral Epicondylitis, Left M77.12  Pain in joint, Left Elbow M25.522  Pain in joint, left shoulder, M25.512  Cervicalgia, M54.2  Precautions/Allergies:   Latex, natural rubber; Dexamethasone; and Other medication   TREATMENT PLAN:  Effective Dates: 3/31/22 TO 22. Frequency/Duration: 1 time every month for 3 months MEDICAL/REFERRING DIAGNOSIS:  Lateral epicondylitis, left elbow [M77.12]  DATE OF ONSET: 2020  REFERRING PHYSICIAN:Dr. Coty Pulido MD Orders: Evaluate and Treat  Return MD Appointment: JERI Costadonald Banks has been seen for 24 visits from 20 to 3/31/2022 for left arm and elbow pain. Patient has performed therapeutic exercises, activities, and had manual therapy to increased strength, ROM and function. Patient has also used modalities for pain control in order to increase function. Patient has shown an increase in function per the DASH with scores of 20/55  Impairments. Her NDI score shows 12/50. She shows continued forward head posture that affects her neck tightness and stiffness. Some increased cranial tightness today. She is progressing with her UE function, but still needs treatment on the cervical dysfunction. Luis Angel Alu, PT, DPT, OCS, CFMT          INITIAL ASSESSMENT:  Ms. Ann Nolan presents to the clinic with pain, strength, and functional deficits in the left elbow with signs and symptoms consistent with lateral epicondylitis. Pt shoulder ROM and functional impairments play a role in the elbow compensating to continue performing daily function and activities.  Pt has marked weakness between left and right  strength that could be caused by a radial nerve compression at the elbow. Pt has Lyme's disease which can affect treatment prognosis. Pt needs skilled PT to help address impairments and improve function as we progress towards goals. Haylee Bowers, PT, DPT, OCS, CFMT     PROBLEM LIST (Impacting functional limitations):  1. Decreased Strength  2. Decreased ADL/Functional Activities  3. Increased Pain  4. Decreased Activity Tolerance  5. Decreased Flexibility/Joint Mobility INTERVENTIONS PLANNED: (Treatment may consist of any combination of the following)  1. Cryotherapy  2. Electrical Stimulation  3. Home Exercise Program (HEP)  4. Manual Therapy  5. Neuromuscular Re-education/Strengthening  6. Range of Motion (ROM)  7. Therapeutic Activites  8. Therapeutic Exercise/Strengthening     GOALS: (Goals have been discussed and agreed upon with patient.)  Discharge Goals: Time Frame: 12 weeks  1. Pt will demonstrate independence with final HEP (ongoing)  2. Pt will demonstrate ability to  a bottle with affect UE without pain to assist with ADLs (MET-7/6/21)  3. Pt will report ability to play guitar without pain in the lateral epicondyle (MET-1/4/22)  4. Pt will report ability to perform yoga routines without interruption from forearm pain (ongoing with improvement)  5. Pt will improve DASH by 10 points to improve daily function(MET-10/5/21)  6. NEW GOAL:  7. Patient will show a greater than 5 point decrease on the NDI in order to show an increase in function. (MET)  8. Patient will report working out two days a week in order to return to full activity    OUTCOME MEASURE:   Tool Used: Disabilities of the Arm, Shoulder and Hand (DASH) Questionnaire - Quick Version  Score:  Initial: 39/55  Most Recent: 20/55 (Date: 3/31/22)   Interpretation of Score: The DASH is designed to measure the activities of daily living in person's with upper extremity dysfunction or pain. Each section is scored on a 1-5 scale, 5 representing the greatest disability.   The scores of each section are added together for a total score of 55. Tool Used: Neck Disability Index (NDI)  Score:  Initial: 21/50  Most Recent: 12/50 (Date: 3/31/22 )   Interpretation of Score: The Neck Disability Index is a revised form of the Oswestry Low Back Pain Index and is designed to measure the activities of daily living in person's with neck pain. Each section is scored on a 0-5 scale, 5 representing the greatest disability. The scores of each section are added together for a total score of 50. MEDICAL NECESSITY:   · Patient is expected to demonstrate progress in strength, range of motion, coordination and functional technique to improve daily function and recreational tasks. · Patient demonstrates good rehab potential due to higher previous functional level. REASON FOR SERVICES/OTHER COMMENTS:  · Patient requires therapy to improve on limitations listed. Total Duration:  PT Patient Time In/Time Out  Time In: 1108  Time Out: 1200    Rehabilitation Potential For Stated Goals: Good  Regarding Lindsay Quintanaus therapy, I certify that the treatment plan above will be carried out by a therapist or under their direction. Thank you for this referral,  Leroy Busby, PT     Referring Physician Signature: Dr. Matt Luo _______________________________ Date _____________     PAIN/SUBJECTIVE:   Initial: Pain Intensity 1: 5 at worst Post Session:  3/10   HISTORY:   History of Injury/Illness (Reason for Referral):  Pt arrived to the clinic with pain in the left elbow. Pt states that she has been dealing with this pain since March of 2020, but did not know if it was just a result of her lyme's disease. Pt states that during the pandemic, her activity in yoga and playing the guitar increased considerably, which could have flared the condition. Pt reports that she is in a considerable amount of pain when she plays guitar or when performing closed chain yoga exercises.  Pt states that she is R hand dominant, but sleeps on her L side which can increase symptoms. Pt also states that she has fallen on her L shoulder in the past. Pt wants to return to PLOF without pain. Past Medical History/Comorbidities:   Ms. Angelika Wilson  has a past medical history of Anemia, Arthritis, Cholecystitis (8/28/2014), Chronic pain, Glaucoma, History of skin cancer, Multiple thyroid nodules, Obstructive sleep apnea, Osteoarthritis of lumbar spine, Osteopenia, and Tennis elbow. She has no past medical history of Adverse effect of anesthesia, Aneurysm ([address] Asthma, Autoimmune disease ([address] CAD (coronary artery disease), Chronic kidney disease, Chronic obstructive pulmonary disease ([address] Coagulation disorder ([address] Diabetes ([address] Difficult intubation, Endocarditis, GERD (gastroesophageal reflux disease), Heart failure ([address] Hypertension, Liver disease, Malignant hyperthermia due to anesthesia, Morbid obesity ([address] Nicotine vapor product user, Non-nicotine vapor product user, Pseudocholinesterase deficiency, Psychiatric disorder, PUD (peptic ulcer disease), Rheumatic fever, Seizures ([address] Stroke ([address] or Thromboembolus ([address] Ms. Angelika Wilson  has a past surgical history that includes hx wisdom teeth extraction; hx appendectomy (age 9); hx rhinoplasty (1990); hx colonoscopy; hx cholecystectomy (2014); hx vascular access; hx maxillofacial; hx septoplasty (02/01/2018); and hx cataract removal (Bilateral, 12/2019).   Social History/Living Environment:       Social History     Socioeconomic History    Marital status: SINGLE     Spouse name: Not on file    Number of children: Not on file    Years of education: Not on file    Highest education level: Not on file   Occupational History    Occupation: not currently employed   Tobacco Use    Smoking status: Never Smoker    Smokeless tobacco: Never Used   Substance and Sexual Activity    Alcohol use: No     Alcohol/week: 0.0 standard drinks    Drug use: No     Comment: none    Sexual activity: Not on file   Other Topics Concern    Not on file   Social History Narrative    Has a Master's degree    Lives alone     Social Determinants of Health     Financial Resource Strain: Low Risk     Difficulty of Paying Living Expenses: Not very hard   Food Insecurity: No Food Insecurity    Worried About Running Out of Food in the Last Year: Never true    Casandra of Food in the Last Year: Never true   Transportation Needs: No Transportation Needs    Lack of Transportation (Medical): No    Lack of Transportation (Non-Medical): No   Physical Activity:     Days of Exercise per Week: Not on file    Minutes of Exercise per Session: Not on file   Stress:     Feeling of Stress : Not on file   Social Connections:     Frequency of Communication with Friends and Family: Not on file    Frequency of Social Gatherings with Friends and Family: Not on file    Attends Zoroastrianism Services: Not on file    Active Member of [address] [place] [address] or Organizations: Not on file    Attends Club or Organization Meetings: Not on file    Marital Status: Not on file   Intimate Partner Violence:     Fear of Current or Ex-Partner: Not on file    Emotionally Abused: Not on file    Physically Abused: Not on file    Sexually Abused: Not on file   Housing Stability:     Unable to Pay for Housing in the Last Year: Not on file    Number of [place] in the Last Year: Not on file    Unstable Housing in the Last Year: Not on file       Prior Level of Function/Work/Activity:  Independent with activity without pain  Out of work   Dominant Side:         RIGHT   Ambulatory/Rehab Services H2 Model Falls Risk Assessment   Risk Factors:       No Risk Factors Identified Ability to Rise from Chair:       (0)  Ability to rise in a single movement   Falls Prevention Plan:       No modifications necessary   Total: (5 or greater = High Risk): 0   ©2010 [employer] [facility] States Patent #[id number].  Federal Law prohibits the replication, distribution or use without written permission from [facility]   Current Medications:       Current Outpatient Medications:     soybean, fermented (Nattokinase) 50 mg cap, Take 50 mg by mouth two (2) times a day., Disp: , Rfl:     nystatin (MYCOSTATIN) 100,000 unit/mL suspension, Take  by mouth., Disp: , Rfl:     OTHER, 1 Capsule by Not Applicable route. Levocarnitine HCL, Disp: , Rfl:     ammonium lactate (LAC-HYDRIN) 12 % lotion, , Disp: , Rfl:     clindamycin (CLEOCIN T) 1 % lotion, , Disp: , Rfl:     montelukast (Singulair) 10 mg tablet, Take 10 mg by mouth nightly., Disp: , Rfl:     fexofenadine (Allegra Allergy) 180 mg tablet, Take  by mouth., Disp: , Rfl:     L-ORNITHINE PO, Take 100 g by mouth daily. L Aspartate 3 full teaspoons daily, Disp: , Rfl:     cholecalciferol, vitamin D3, (VITAMIN D3 PO), 5,000 Units. , Disp: , Rfl:     OTHER, 500 mg. Lactoferrin, Disp: , Rfl:     magnesium 200 mg tab, 200 mg. 3 caps, Disp: , Rfl:     MANGANESE PO, 10 mg daily. , Disp: , Rfl:     ascorbic acid, vitamin C, (Vitamin C) 1,000 mg tablet, , Disp: , Rfl:     calcium carbonate (CALCIUM 300 PO), , Disp: , Rfl:     Cyanocobalamin-Cobamamide (B12) 5,000-100 mcg lozg, , Disp: , Rfl:     OTHER, 1,800 mcg two (2) times a day. Methyl folate, Disp: , Rfl:     MOLYBDENUM, BULK,, 1,500 mcg daily. , Disp: , Rfl:     OTHER, 1,100 mg two (2) times a day. Nattokinese, Disp: , Rfl:     OTHER, Phospholipids, Disp: , Rfl:     OTHER, 500 mg.  Quercitin, Disp: , Rfl:     OTHER, Zinc/joan 15/1, Disp: , Rfl:     famotidine (PEPCID) 10 mg tablet, Take 20 mg by mouth two (2) times a day., Disp: , Rfl:     Lactobac 40-Bifido 3-S.thermop (Probiotic) 100 billion cell cap, , Disp: , Rfl:     levocarnitine HCl (ACETYL-L-CARNITINE), , Disp: , Rfl:     melatonin 1 mg subl, , Disp: , Rfl:     minocycline (Minocin) 100 mg capsule, , Disp: , Rfl:     fish oil-omega-3-DHA-EPA (Fish Oil) 300-1,000 mg capsule, , Disp: , Rfl:     organ concentrates (ADRENAL PO), , Disp: , Rfl:     prasterone, DHEA, (DHEA PO), , Disp: , Rfl:     resveratroL 250 mg cap, , Disp: , Rfl:     Saccharomyces boulardii (FLORASTOR) 250 mg capsule, , Disp: , Rfl:     Taurine 500 mg cap, , Disp: , Rfl:     tinidazole (TINDAMAX) 500 mg tablet, , Disp: , Rfl:     CoQ10, Ubiquinol, 200 mg cap, , Disp: , Rfl:     vitamin K2 100 mcg cap, , Disp: , Rfl:     Xylitol, Bulk, powd, , Disp: , Rfl:     tretinoin (RETIN-A) 0.05 % topical cream, APPLY THINLY TO FACE EVERY EVENING AS TOLERATED, Disp: , Rfl:     hydrOXYzine HCL (ATARAX) 50 mg tablet, TAKE 1/2 TO 1 TABLET BY MOUTH 30 MINUTES PRIOR TO SLEEP, Disp: , Rfl:     hydroquinone (ESOTERICA, MELQUIN) 4 % topical cream, APPLY TO DARK SPOTS 2 TIMES A DAY FOR 3 MONTHS, Disp: , Rfl:     estradioL (ESTRACE) 0.01 % (0.1 mg/gram) vaginal cream, Apply pea-sized amount (0.5 g) nightly x 2 weeks, then twice weekly, Disp: , Rfl:     timolol (TIMOPTIC) 0.5 % ophthalmic solution, INSTILL 1 DROP IN BOTH EYES DAILY; use day of surgery, Disp: , Rfl: 2    multivitamin (ONE A DAY) tablet, Take 1 Tab by mouth. Take 1 tab twice weekly, Disp: , Rfl:     mirtazapine (REMERON) 15 mg tablet, Take 3.2 mg by mouth nightly. Takes for pain and sleep, Disp: , Rfl:     rifabutin (MYCOBUTIN) 150 mg capsule, Take 1 Capsule by mouth two (2) times a day. 150mg MWF week 1 and MTW week 2, Disp: , Rfl:     gabapentin (NEURONTIN) 100 mg capsule, Take 300 mg by mouth nightly., Disp: , Rfl: 6    lorazepam (ATIVAN) 1 mg tablet, Take 0.25 mg by mouth nightly.  Indications: difficulty sleeping, Disp: , Rfl:    Date Last Reviewed:  3/31/2022   Number of Personal Factors/Comorbidities that affect the Plan of Care: 1-2: MODERATE COMPLEXITY   EXAMINATION:   Observation/Orthostatic Postural Assessment:          Pt has rounded shoulders and forward head lean        Pt guards L arm use to prevent pain        Slight right cervical tilt in sitting/laying  -Increased forward head and rounded shoulders posture  Palpation:          Increased tone on proximal wrist extensors        Superficial fascial tightness on wrist extensors distal to lateral epicondyle        TTP on superior border of lateral epicondyle near joint line        Crepitus in glenohumeral joint during shoulder flexion/external rotation        Decreased glenohumeral inferior/posterior glide  Cervical-tightness in paraspinals, suboccipitals, and SCM  ROM:          L shoulder ROM:   FE: 160 deg (not smooth) and some pain  ER: 70 deg        L elbow ROM          Cervical Rotation: 75% left and 80% right  Strength:          Wrist MMT:  Flexion: 5/5 B  Extension: 5/5 R; 3/5 L (with pain)  UD: 5/5 B  RD: 5/5 B (with pain on L)        Shoulder MMT:  L shoulder ER: 4/5 with slight pain, otherwise all other functions WNL         Strength Dynamometer (pounds)  R: 25 pounds  L: 10 pounds     Body Structures Involved:  1. Bones  2. Joints  3. Muscles  4. Ligaments Body Functions Affected:  1. Neuromusculoskeletal  2. Movement Related Activities and Participation Affected:  1. General Tasks and Demands  2. Self Care  3. Interpersonal Interactions and Relationships  4.  Community, Social and [place]   Number of elements (examined above) that affect the Plan of Care: 4+: HIGH COMPLEXITY   CLINICAL PRESENTATION:   Presentation: Stable and uncomplicated: LOW COMPLEXITY   CLINICAL DECISION MAKING:   Use of outcome tool(s) and clinical judgement create a POC that gives a: Clear prediction of patient's progress: LOW COMPLEXITY

## 2022-03-31 NOTE — PROGRESS NOTES
Estefany Patel  : 1960  Primary: Ana Hodgkin Medicare Advantage  Secondary:  [address] at [facility]  [address] [place] [address]  [phone]   [phone]      OUTPATIENT PHYSICAL THERAPY: Daily Treatment Note 3/31/2022  Visit Count:  27    ICD-10: Treatment Diagnosis:   Lateral Epicondylitis, Left M77.12  Pain in joint, Left Elbow M25.522  Pain in joint, left shoulder, M25.512  Cervicalgia, M54.2  Precautions/Allergies:   Latex, natural rubber; Dexamethasone; and Other medication   TREATMENT PLAN:  Effective Dates: 3/31/22 TO 22. Frequency/Duration: 1 time every month for 3 months MEDICAL/REFERRING DIAGNOSIS:  Lateral epicondylitis, left elbow [M77.12]  DATE OF ONSET: 2020  REFERRING PHYSICIAN:  Dr. Kylah Multani MD Orders: Evaluate and Treat  Return MD Appointment: TBD       Pre-treatment Symptoms/Complaints:  Pt reports that the neck is about the same  She has those facial feelings still  Pain: Initial: Pain Intensity 1: 10 Post Session:  1/10   Medications Last Reviewed:  3/31/2022  Updated Objective Findings:  Some suboccipital tightness right greater than left today. TREATMENT:     THERAPEUTIC EXERCISE: (0 minutes):  Exercises per grid below to improve mobility, strength and coordination. Required minimal visual, verbal and manual cues to promote proper body alignment, promote proper body posture and promote proper body mechanics. Progressed resistance, range, repetitions and complexity of movement as indicated.    Date:  3/31/2022   Activity/Exercise Parameters   Wrist extensor stretch (not today) 5 x 30 sec   B shoulder ER 3 x 10   Scapular setting 2 x 10   Pt ed Scapular position   Pronation/Supination (not today) With red band today x 15    Prone on elbows (not today)    Sleeper stretch Left side lie 5 x 10 sec hold with self contract relax   First rib mobilization reviewed   Axial elongation With w-retractions x 10   Hip stretches Piriformis and gluts x 3 min and 1 min for hip flexors     MANUAL THERAPY: (45 minutes): Joint mobilization and Soft tissue mobilization was utilized and necessary because of the patient's restricted joint motion, loss of articular motion and restricted motion of soft tissue.   -Suboccipital release right with axial elongation   - soft tissue mobilization around clavicle, subscapularis, Latissimus Dorsi, wrist extensors, biceps, scalenes. - posterior/inferior glenohumeral glides   - scapular post depression/anterior elevation with post depression hold after contract/relax  -Cranial suture mobilization right side frontal and sagittal sutures and occiput    Modalities:   Ultrasound (0 min): 50% at 2 mHz for 8 min to right epicondyle. Treatment/Session Summary:    · Response to Treatment:  Pt symptoms are improving with time and mobilizations. Continue to follow one time a month  · Communication/Consultation:  None today  · Equipment provided today:  None today  · Recommendations/Intent for next treatment session: Next visit will focus on shoulder ROM and function and isometric strengthening of wrist extensors.     Total Treatment Billable Duration:  45 minutes  PT Patient Time In/Time Out  Time In: 1108  Time Out: [facility] [address] PT    Future Appointments   Date Time Provider Jono Butcher   4/28/2022 11:00 AM Shree Gardner PT [facility]   7/28/2022 10:20 AM Keely May.MD Yazmin

## 2022-04-28 ENCOUNTER — HOSPITAL ENCOUNTER (OUTPATIENT)
Dept: PHYSICAL THERAPY | Age: 62
Discharge: HOME | End: 2022-04-28
Payer: MEDICARE

## 2022-04-28 PROCEDURE — 97110 THERAPEUTIC EXERCISES: CPT

## 2022-04-28 PROCEDURE — 97140 MANUAL THERAPY 1/> REGIONS: CPT

## 2022-04-28 NOTE — PROGRESS NOTES
Constantino Justine Patel  : 1960  Primary: Rhett Malik Medicare Advantage  Secondary:  [address]  at [facility]  [address] [place] [address]  [phone]   [phone]      OUTPATIENT PHYSICAL THERAPY: Daily Treatment Note 2022  Visit Count:  28    ICD-10: Treatment Diagnosis:   Lateral Epicondylitis, Left M77.12  Pain in joint, Left Elbow M25.522  Pain in joint, left shoulder, M25.512  Cervicalgia, M54.2  Precautions/Allergies:   Latex, natural rubber; Dexamethasone; and Other medication   TREATMENT PLAN:  Effective Dates: 3/31/22 TO 22. Frequency/Duration: 1 time every month for 3 months MEDICAL/REFERRING DIAGNOSIS:  Lateral epicondylitis, left elbow [M77.12]  DATE OF ONSET: 2020  REFERRING PHYSICIAN:  Dr. Nehemiah Nesbitt MD Orders: Evaluate and Treat  Return MD Appointment: TBD       Pre-treatment Symptoms/Complaints:  Pt reports that the neck is about the same She is strying to keep her activity up, but some days it is diffficult  Pain: Initial: Pain Intensity 1: 4/10 Post Session:  1/10   Medications Last Reviewed:  2022  Updated Objective Findings:  Some suboccipital tightness right greater than left today. TREATMENT:     THERAPEUTIC EXERCISE: (10 minutes):  Exercises per grid below to improve mobility, strength and coordination. Required minimal visual, verbal and manual cues to promote proper body alignment, promote proper body posture and promote proper body mechanics. Progressed resistance, range, repetitions and complexity of movement as indicated.    Date:  2022   Activity/Exercise Parameters   Wrist extensor stretch (not today) 5 x 30 sec   B shoulder ER 3 x 10   Scapular setting 2 x 10   Pt ed Scapular position   Pronation/Supination (not today) With red band today x 15    Prone on elbows (not today)    Sleeper stretch Left side lie 5 x 10 sec hold with self contract relax   First rib mobilization reviewed   Axial elongation With w-retractions x 10   Hip stretches Piriformis and gluts x 3 min and 1 min for hip flexors     MANUAL THERAPY: (45 minutes): Joint mobilization and Soft tissue mobilization was utilized and necessary because of the patient's restricted joint motion, loss of articular motion and restricted motion of soft tissue.   -Suboccipital release right with axial elongation   - soft tissue mobilization around clavicle, subscapularis, Latissimus Dorsi, wrist extensors, biceps, scalenes. - posterior/inferior glenohumeral glides   - scapular post depression/anterior elevation with post depression hold after contract/relax  -Cranial suture mobilization right side frontal and sagittal sutures and occiput    Modalities:   Ultrasound (0 min): 50% at 2 mHz for 8 min to right epicondyle. Treatment/Session Summary:     · Response to Treatment:  Pt symptoms are improving with time and mobilizations. Continue to follow one time a month. She still has increase OA extension in sitting that we are working on with cues for awareness  · Communication/Consultation:  None today  · Equipment provided today:  None today  · Recommendations/Intent for next treatment session: Next visit will focus on shoulder ROM and function and isometric strengthening of wrist extensors.     Total Treatment Billable Duration:  55 minutes  PT Patient Time In/Time Out  Time In: 1102  Time Out: [facility], PT    Future Appointments   Date Time Provider Jono Butcher   5/31/2022 11:00 AM Candace Mejia PT [facility]   7/28/2022 10:20 AM Anton Frazier.MD Leo

## 2022-05-02 ENCOUNTER — HOSPITAL ENCOUNTER (OUTPATIENT)
Dept: PHYSICAL THERAPY | Age: 62
Setting detail: RECURRING SERIES
Discharge: HOME | End: 2022-05-05
Payer: MEDICARE

## 2022-05-31 ENCOUNTER — HOSPITAL ENCOUNTER (OUTPATIENT)
Dept: PHYSICAL THERAPY | Age: 62
Setting detail: RECURRING SERIES
Discharge: HOME | End: 2022-06-03
Payer: MEDICARE

## 2022-05-31 ENCOUNTER — APPOINTMENT (OUTPATIENT)
Dept: PHYSICAL THERAPY | Age: 62
End: 2022-05-31

## 2022-05-31 PROCEDURE — 97140 MANUAL THERAPY 1/> REGIONS: CPT

## 2022-05-31 NOTE — THERAPY RECERTIFICATION
[facility] Sample  MRN: [id number]  Berhane Arnett PT   Physical Therapist   RADIOLOGY   Therapy Recertification      Signed   Date of Service:  3/31/2022 11:00 AM          Reevaluation                     Morena Dean  : 1960  Primary: Pearl Reinoso Medicare Advantage  Secondary:  [address] at [facility]  [address] [place] [address]  [phone]   [phone]           OUTPATIENT PHYSICAL THERAPY:Recertification    ICD-10: Treatment Diagnosis:   Lateral Epicondylitis, Left M77.12  Pain in joint, Left Elbow M25.522  Pain in joint, left shoulder, M25.512  Cervicalgia, M54.2  Precautions/Allergies:   Latex, natural rubber; Dexamethasone; and Other medication   TREATMENT PLAN:  Effective Dates: 3/31/22 TO 22. Frequency/Duration: 1 time every month for 3 months MEDICAL/REFERRING DIAGNOSIS:  Lateral epicondylitis, left elbow [M77.12]  DATE OF ONSET: 2020  REFERRING PHYSICIAN:Dr. Vikki Cervantes MD Orders: Evaluate and Treat  Return MD Appointment: BECKY   Morena Dean has been seen for 24 visits from 20 to 3/31/2022 for left arm and elbow pain. Patient has performed therapeutic exercises, activities, and had manual therapy to increased strength, ROM and function. Patient has also used modalities for pain control in order to increase function. Patient has shown an increase in function per the DASH with scores of 20/55  Impairments. Her NDI score shows 12/50. She shows continued forward head posture that affects her neck tightness and stiffness. Some increased cranial tightness today. She is progressing with her UE function, but still needs treatment on the cervical dysfunction.     Carina Elliott, PT, DPT, OCS, CFMT              INITIAL ASSESSMENT:  Ms. Lico Herrera presents to the clinic with pain, strength, and functional deficits in the left elbow with signs and symptoms consistent with lateral epicondylitis.  Pt shoulder ROM and functional impairments play a role in the elbow compensating to continue performing daily function and activities. Pt has marked weakness between left and right  strength that could be caused by a radial nerve compression at the elbow. Pt has Lyme's disease which can affect treatment prognosis. Pt needs skilled PT to help address impairments and improve function as we progress towards goals. Дмитрий Araiza, PT, DPT, OCS, CFMT      PROBLEM LIST (Impacting functional limitations):  1. Decreased Strength  2. Decreased ADL/Functional Activities  3. Increased Pain  4. Decreased Activity Tolerance  5. Decreased Flexibility/Joint Mobility INTERVENTIONS PLANNED: (Treatment may consist of any combination of the following)  1. Cryotherapy  2. Electrical Stimulation  3. Home Exercise Program (HEP)  4. Manual Therapy  5. Neuromuscular Re-education/Strengthening  6. Range of Motion (ROM)  7. Therapeutic Activites  8. Therapeutic Exercise/Strengthening      GOALS: (Goals have been discussed and agreed upon with patient.)  Discharge Goals: Time Frame: 12 weeks  1. Pt will demonstrate independence with final HEP (ongoing)  2. Pt will demonstrate ability to  a bottle with affect UE without pain to assist with ADLs (MET-7/6/21)  3. Pt will report ability to play guitar without pain in the lateral epicondyle (MET-1/4/22)  4. Pt will report ability to perform yoga routines without interruption from forearm pain (ongoing with improvement)  5. Pt will improve DASH by 10 points to improve daily function(MET-10/5/21)  6. NEW GOAL:  7. Patient will show a greater than 5 point decrease on the NDI in order to show an increase in function. (MET)  8. Patient will report working out two days a week in order to return to full activity     OUTCOME MEASURE:   Tool Used: Disabilities of the Arm, Shoulder and Hand (DASH) Questionnaire - Quick Version  Score:  Initial: 39/55  Most Recent: 20/55 (Date: 3/31/22)   Interpretation of Score:  The DASH is designed to measure the activities of daily living in person's with upper extremity dysfunction or pain. Each section is scored on a 1-5 scale, 5 representing the greatest disability. The scores of each section are added together for a total score of 55. Tool Used: Neck Disability Index (NDI)  Score:  Initial: 21/50  Most Recent: 12/50 (Date: 3/31/22 )   Interpretation of Score: The Neck Disability Index is a revised form of the Oswestry Low Back Pain Index and is designed to measure the activities of daily living in person's with neck pain. Each section is scored on a 0-5 scale, 5 representing the greatest disability. The scores of each section are added together for a total score of 50.         MEDICAL NECESSITY:   · Patient is expected to demonstrate progress in strength, range of motion, coordination and functional technique to improve daily function and recreational tasks. · Patient demonstrates good rehab potential due to higher previous functional level. REASON FOR SERVICES/OTHER COMMENTS:  · Patient requires therapy to improve on limitations listed. Total Duration:  PT Patient Time In/Time Out  Time In: 1108  Time Out: 1200     Rehabilitation Potential For Stated Goals: Good  Regarding Dannie Barbyazmin therapy, I certify that the treatment plan above will be carried out by a therapist or under their direction. Thank you for this referral,  Jin Gracia, PT     Referring Physician Signature: Dr. Neva Garcia _______________________________ Date _____________               PAIN/SUBJECTIVE:   Initial: Pain Intensity 1: 5 at worst Post Session:  3/10   HISTORY:   History of Injury/Illness (Reason for Referral):  Pt arrived to the clinic with pain in the left elbow. Pt states that she has been dealing with this pain since March of 2020, but did not know if it was just a result of her lyme's disease.  Pt states that during the pandemic, her activity in yoga and playing the guitar increased considerably, which could have flared the condition. Pt reports that she is in a considerable amount of pain when she plays guitar or when performing closed chain yoga exercises. Pt states that she is R hand dominant, but sleeps on her L side which can increase symptoms. Pt also states that she has fallen on her L shoulder in the past. Pt wants to return to PLOF without pain. Past Medical History/Comorbidities:   Ms. Emily Richter  has a past medical history of Anemia, Arthritis, Cholecystitis (8/28/2014), Chronic pain, Glaucoma, History of skin cancer, Multiple thyroid nodules, Obstructive sleep apnea, Osteoarthritis of lumbar spine, Osteopenia, and Tennis elbow.     She has no past medical history of Adverse effect of anesthesia, Aneurysm ([address] Asthma, Autoimmune disease ([address] CAD (coronary artery disease), Chronic kidney disease, Chronic obstructive pulmonary disease ([address] Coagulation disorder ([address] Diabetes ([address] Difficult intubation, Endocarditis, GERD (gastroesophageal reflux disease), Heart failure ([address] Hypertension, Liver disease, Malignant hyperthermia due to anesthesia, Morbid obesity ([address] Nicotine vapor product user, Non-nicotine vapor product user, Pseudocholinesterase deficiency, Psychiatric disorder, PUD (peptic ulcer disease), Rheumatic fever, Seizures ([address] Stroke ([address] or Thromboembolus ([address] Ms. Emily Richter  has a past surgical history that includes hx wisdom teeth extraction; hx appendectomy (age 9); hx rhinoplasty (1990); hx colonoscopy; hx cholecystectomy (2014); hx vascular access; hx maxillofacial; hx septoplasty (02/01/2018); and hx cataract removal (Bilateral, 12/2019).   Social History/Living Environment:       Social History            Socioeconomic History    Marital status: SINGLE       Spouse name: Not on file    Number of children: Not on file    Years of education: Not on file    Highest education level: Not on file   Occupational History    Occupation: not currently employed rise in a single movement   Falls Prevention Plan:       No modifications necessary   Total: (5 or greater = High Risk): 0   ©2010 [facility]. All Rights Reserved. [facility] States Patent #[id number]. Federal Law prohibits the replication, distribution or use without written permission from [facility] [email or web address]   Current Medications:         Current Outpatient Medications:     soybean, fermented (Nattokinase) 50 mg cap, Take 50 mg by mouth two (2) times a day., Disp: , Rfl:     nystatin (MYCOSTATIN) 100,000 unit/mL suspension, Take  by mouth., Disp: , Rfl:     OTHER, 1 Capsule by Not Applicable route. Levocarnitine HCL, Disp: , Rfl:     ammonium lactate (LAC-HYDRIN) 12 % lotion, , Disp: , Rfl:     clindamycin (CLEOCIN T) 1 % lotion, , Disp: , Rfl:     montelukast (Singulair) 10 mg tablet, Take 10 mg by mouth nightly., Disp: , Rfl:     fexofenadine (Allegra Allergy) 180 mg tablet, Take  by mouth., Disp: , Rfl:     L-ORNITHINE PO, Take 100 g by mouth daily. L Aspartate 3 full teaspoons daily, Disp: , Rfl:     cholecalciferol, vitamin D3, (VITAMIN D3 PO), 5,000 Units. , Disp: , Rfl:     OTHER, 500 mg. Lactoferrin, Disp: , Rfl:     magnesium 200 mg tab, 200 mg. 3 caps, Disp: , Rfl:     MANGANESE PO, 10 mg daily. , Disp: , Rfl:     ascorbic acid, vitamin C, (Vitamin C) 1,000 mg tablet, , Disp: , Rfl:     calcium carbonate (CALCIUM 300 PO), , Disp: , Rfl:     Cyanocobalamin-Cobamamide (B12) 5,000-100 mcg lozg, , Disp: , Rfl:     OTHER, 1,800 mcg two (2) times a day. Methyl folate, Disp: , Rfl:     MOLYBDENUM, BULK,, 1,500 mcg daily. , Disp: , Rfl:     OTHER, 1,100 mg two (2) times a day. Nattokinese, Disp: , Rfl:     OTHER, Phospholipids, Disp: , Rfl:     OTHER, 500 mg.  Quercitin, Disp: , Rfl:     OTHER, Zinc/elva 15/1, Disp: , Rfl:     famotidine (PEPCID) 10 mg tablet, Take 20 mg by mouth two (2) times a day., Disp: , Rfl:     Lactobac 40-Bifido 3-S.thermop (Probiotic) 100 billion cell cap, , Disp: , Rfl:     levocarnitine HCl (ACETYL-L-CARNITINE), , Disp: , Rfl:     melatonin 1 mg subl, , Disp: , Rfl:     minocycline (Minocin) 100 mg capsule, , Disp: , Rfl:     fish oil-omega-3-DHA-EPA (Fish Oil) 300-1,000 mg capsule, , Disp: , Rfl:     organ concentrates (ADRENAL PO), , Disp: , Rfl:     prasterone, DHEA, (DHEA PO), , Disp: , Rfl:     resveratroL 250 mg cap, , Disp: , Rfl:     Saccharomyces boulardii (FLORASTOR) 250 mg capsule, , Disp: , Rfl:     Taurine 500 mg cap, , Disp: , Rfl:     tinidazole (TINDAMAX) 500 mg tablet, , Disp: , Rfl:     CoQ10, Ubiquinol, 200 mg cap, , Disp: , Rfl:     vitamin K2 100 mcg cap, , Disp: , Rfl:     Xylitol, Bulk, powd, , Disp: , Rfl:     tretinoin (RETIN-A) 0.05 % topical cream, APPLY THINLY TO FACE EVERY EVENING AS TOLERATED, Disp: , Rfl:     hydrOXYzine HCL (ATARAX) 50 mg tablet, TAKE 1/2 TO 1 TABLET BY MOUTH 30 MINUTES PRIOR TO SLEEP, Disp: , Rfl:     hydroquinone (ESOTERICA, MELQUIN) 4 % topical cream, APPLY TO DARK SPOTS 2 TIMES A DAY FOR 3 MONTHS, Disp: , Rfl:     estradioL (ESTRACE) 0.01 % (0.1 mg/gram) vaginal cream, Apply pea-sized amount (0.5 g) nightly x 2 weeks, then twice weekly, Disp: , Rfl:     timolol (TIMOPTIC) 0.5 % ophthalmic solution, INSTILL 1 DROP IN BOTH EYES DAILY; use day of surgery, Disp: , Rfl: 2    multivitamin (ONE A DAY) tablet, Take 1 Tab by mouth. Take 1 tab twice weekly, Disp: , Rfl:     mirtazapine (REMERON) 15 mg tablet, Take 3.2 mg by mouth nightly. Takes for pain and sleep, Disp: , Rfl:     rifabutin (MYCOBUTIN) 150 mg capsule, Take 1 Capsule by mouth two (2) times a day. 150mg MWF week 1 and MTW week 2, Disp: , Rfl:     gabapentin (NEURONTIN) 100 mg capsule, Take 300 mg by mouth nightly., Disp: , Rfl: 6    lorazepam (ATIVAN) 1 mg tablet, Take 0.25 mg by mouth nightly.  Indications: difficulty sleeping, Disp: , Rfl:    Date Last Reviewed:  3/31/2022   Number of Personal Factors/Comorbidities that affect the Plan of Care: 1-2: MODERATE COMPLEXITY   EXAMINATION:   Observation/Orthostatic Postural Assessment:          Pt has rounded shoulders and forward head lean        Pt guards L arm use to prevent pain        Slight right cervical tilt in sitting/laying  -Increased forward head and rounded shoulders posture  Palpation:          Increased tone on proximal wrist extensors        Superficial fascial tightness on wrist extensors distal to lateral epicondyle        TTP on superior border of lateral epicondyle near joint line        Crepitus in glenohumeral joint during shoulder flexion/external rotation        Decreased glenohumeral inferior/posterior glide  Cervical-tightness in paraspinals, suboccipitals, and SCM  ROM:          L shoulder ROM:   FE: 160 deg (not smooth) and some pain  ER: 70 deg        L elbow ROM          Cervical Rotation: 75% left and 80% right  Strength:          Wrist MMT:  Flexion: 5/5 B  Extension: 5/5 R; 3/5 L (with pain)  UD: 5/5 B  RD: 5/5 B (with pain on L)        Shoulder MMT:  L shoulder ER: 4/5 with slight pain, otherwise all other functions WNL         Strength Dynamometer (pounds)  R: 25 pounds  L: 10 pounds      Body Structures Involved:  1. Bones  2. Joints  3. Muscles  4. Ligaments Body Functions Affected:  1. Neuromusculoskeletal  2. Movement Related Activities and Participation Affected:  1. General Tasks and Demands  2. Self Care  3. Interpersonal Interactions and Relationships  4.  Community, Social and [place]   Number of elements (examined above) that affect the Plan of Care: 4+: HIGH COMPLEXITY   CLINICAL PRESENTATION:   Presentation: Stable and uncomplicated: LOW COMPLEXITY   CLINICAL DECISION MAKING:   Use of outcome tool(s) and clinical judgement create a POC that gives a: Clear prediction of patient's progress: LOW COMPLEXITY               Cosigned by: Deidra Page MD at 4/21/2022  4:05 PM

## 2022-05-31 NOTE — PROGRESS NOTES
Dilip Burdick  MRN: [id number]  Jaime Calvo PT   Physical Therapist   Specialty:  Physical Therapy   Progress Notes      Signed   Date of Service:  2022 11:02 AM                    Trina Jacobsen  : 1960  Primary: Prashant Morales Medicare Advantage  Secondary:  [address] at [facility]  [address] [place] [address]  [phone]   [phone]       OUTPATIENT PHYSICAL THERAPY: Daily Treatment Note 2022  Visit Count:  28     ICD-10: Treatment Diagnosis:   Lateral Epicondylitis, Left M77.12  Pain in joint, Left Elbow M25.522  Pain in joint, left shoulder, M25.512  Cervicalgia, M54.2  Precautions/Allergies:   Latex, natural rubber; Dexamethasone; and Other medication   TREATMENT PLAN:  Effective Dates: 3/31/22 TO 22. Frequency/Duration: 1 time every month for 3 months MEDICAL/REFERRING DIAGNOSIS:  Lateral epicondylitis, left elbow [M77.12]  DATE OF ONSET: 2020  REFERRING PHYSICIAN:  Dr. Melody Fields MD Orders: Evaluate and Treat  Return MD Appointment: TBD         Pre-treatment Symptoms/Complaints:  Pt reports that the neck is doing a little better as well as the hip lately. She reports that she still has some issues with sleeping, but has been more active lately  Pain: Initial: Pain Intensity 1: 4/10 Post Session:  1/10   Medications Last Reviewed:  2022  Updated Objective Findings:  Some suboccipital tightness right greater than left today. TREATMENT:      THERAPEUTIC EXERCISE: (0 minutes):  Exercises per grid below to improve mobility, strength and coordination. Required minimal visual, verbal and manual cues to promote proper body alignment, promote proper body posture and promote proper body mechanics.   Progressed resistance, range, repetitions and complexity of movement as indicated.    Date:  2022   Activity/Exercise Parameters   Wrist extensor stretch (not today) 5 x 30 sec   B shoulder ER 3 x 10   Scapular setting 2 x 10   Pt ed Scapular position   Pronation/Supination (not today) With red band today x 15    Prone on elbows (not today)     Sleeper stretch Left side lie 5 x 10 sec hold with self contract relax   First rib mobilization reviewed   Axial elongation With w-retractions x 10   Hip stretches Piriformis and gluts x 3 min and 1 min for hip flexors      MANUAL THERAPY: (45 minutes): Joint mobilization and Soft tissue mobilization was utilized and necessary because of the patient's restricted joint motion, loss of articular motion and restricted motion of soft tissue.   -Suboccipital release right with axial elongation   - soft tissue mobilization around clavicle, subscapularis, Latissimus Dorsi, wrist extensors, biceps, scalenes. - posterior/inferior glenohumeral glides   - scapular post depression/anterior elevation with post depression hold after contract/relax  -Cranial suture mobilization right side frontal and sagittal sutures and occiput     Modalities:   Ultrasound (0 min): 50% at 2 mHz for 8 min to right epicondyle.     Treatment/Session Summary:     · Response to Treatment:  Pt symptoms show some steady improvement.  Will discuss plan of care next session  · Communication/Consultation:  None today  · Equipment provided today:  None today  · Recommendations/Intent for next treatment session: Next visit will focus on shoulder ROM and function and isometric strengthening of wrist extensors.     Total Treatment Billable Duration:  45 minutes  PT Patient Time In/Time Out  Time In: 1110  Time Out: [address] PT            Future Appointments   Date Time Provider Hawa Reyes   5/31/2022 11:00 AM Susan Simon PT [facility]   7/28/2022 10:20 AM Chloé Omer MD [facility]

## 2022-06-21 ENCOUNTER — PATIENT MESSAGE (OUTPATIENT)
Dept: PRIMARY CARE CLINIC | Facility: CLINIC | Age: 62
End: 2022-06-21

## 2022-07-07 ENCOUNTER — HOSPITAL ENCOUNTER (OUTPATIENT)
Dept: PHYSICAL THERAPY | Age: 62
Setting detail: RECURRING SERIES
Discharge: HOME | End: 2022-07-10
Payer: MEDICARE

## 2022-07-07 PROCEDURE — 97140 MANUAL THERAPY 1/> REGIONS: CPT

## 2022-07-07 NOTE — THERAPY DISCHARGE
Miriam Jameson  MRN: [id number]  Tye Kaba, PT   Physical Therapist   RADIOLOGY   Therapy Recertification      Signed   Date of Service:  3/31/2022 11:00 AM          Reevaluation                     Tangela Rios  : 1960  Primary: Colonashleigh Young Medicare Advantage  Secondary:  [address] [facility] at [facility]  [address] [place] [address]  [phone]   [phone]           OUTPATIENT PHYSICAL THERAPY:Recertification/Discharge 3/31/2022   ICD-10: Treatment Diagnosis:   Lateral Epicondylitis, Left M77.12  Pain in joint, Left Elbow M25.522  Pain in joint, left shoulder, M25.512  Cervicalgia, M54.2  Precautions/Allergies:   Latex, natural rubber; Dexamethasone; and Other medication   TREATMENT PLAN:  Effective Dates: 22 TO 22. Re-cert and discharge today MEDICAL/REFERRING DIAGNOSIS:  Lateral epicondylitis, left elbow [M77.12]  DATE OF ONSET: 2020  REFERRING PHYSICIAN:Dr. Mone Winters MD Orders: Evaluate and Treat  Return MD Appointment: BECKY   Tangela Rios has been seen for 30 visits from 20 to 22 for left arm and elbow pain. Patient has performed therapeutic exercises, activities, and had manual therapy to increased strength, ROM and function. Patient has shown an increase in function per the DASH with scores of 19/55 and her NDI score shows 5/50. Patient has been doing well in maintaining her progress and has had a plateau with her therapy. She has reached her goals for therapy at this time and will be discharged. Please re-order therapy if further is needed. Thank you for the referral.       Jesse Zelaya, PT, DPT, OCS, CFMT              INITIAL ASSESSMENT:  Ms. Kenna Gallegos presents to the clinic with pain, strength, and functional deficits in the left elbow with signs and symptoms consistent with lateral epicondylitis.  Pt shoulder ROM and functional impairments play a role in the elbow compensating to continue performing daily extremity dysfunction or pain. Each section is scored on a 1-5 scale, 5 representing the greatest disability. The scores of each section are added together for a total score of 55. Tool Used: Neck Disability Index (NDI)  Score:  Initial: 21/50  Most Recent: 5/50 (Date: 7/7/22 )   Interpretation of Score: The Neck Disability Index is a revised form of the Oswestry Low Back Pain Index and is designed to measure the activities of daily living in person's with neck pain. Each section is scored on a 0-5 scale, 5 representing the greatest disability. The scores of each section are added together for a total score of 50.         ·   Total Duration:  PT Patient Time In/Time Out  Time In: 1012  Time Out: 1100     Rehabilitation Potential For Stated Goals: Good  Regarding [employer] therapy, I certify that the treatment plan above will be carried out by a therapist or under their direction. Thank you for this referral,  Gracie Thompson, PT     Referring Physician Signature: Dr. George Sparks _______________________________ Date _____________               PAIN/SUBJECTIVE:   Initial: Pain Intensity 1: 3 at worst Post Session:  2/10   HISTORY:   History of Injury/Illness (Reason for Referral):  Pt arrived to the clinic with pain in the left elbow. Pt states that she has been dealing with this pain since March of 2020, but did not know if it was just a result of her lyme's disease. Pt states that during the pandemic, her activity in yoga and playing the guitar increased considerably, which could have flared the condition. Pt reports that she is in a considerable amount of pain when she plays guitar or when performing closed chain yoga exercises. Pt states that she is R hand dominant, but sleeps on her L side which can increase symptoms. Pt also states that she has fallen on her L shoulder in the past. Pt wants to return to PLOF without pain.   Past Medical History/Comorbidities:   Ms. Shabbir Vega  has a past medical history of Anemia, Arthritis, Cholecystitis (8/28/2014), Chronic pain, Glaucoma, History of skin cancer, Multiple thyroid nodules, Obstructive sleep apnea, Osteoarthritis of lumbar spine, Osteopenia, and Tennis elbow.     She has no past medical history of Adverse effect of anesthesia, Aneurysm ([address] Asthma, Autoimmune disease ([address] CAD (coronary artery disease), Chronic kidney disease, Chronic obstructive pulmonary disease ([address] Coagulation disorder ([address] Diabetes ([address] Difficult intubation, Endocarditis, GERD (gastroesophageal reflux disease), Heart failure ([address] Hypertension, Liver disease, Malignant hyperthermia due to anesthesia, Morbid obesity ([address] Nicotine vapor product user, Non-nicotine vapor product user, Pseudocholinesterase deficiency, Psychiatric disorder, PUD (peptic ulcer disease), Rheumatic fever, Seizures ([address] Stroke ([address] or Thromboembolus ([address] Ms. Rony Blankenship  has a past surgical history that includes hx wisdom teeth extraction; hx appendectomy (age 9); hx rhinoplasty (1990); hx colonoscopy; hx cholecystectomy (2014); hx vascular access; hx maxillofacial; hx septoplasty (02/01/2018); and hx cataract removal (Bilateral, 12/2019). Social History/Living Environment:       Social History            Socioeconomic History    Marital status: SINGLE       Spouse name: Not on file    Number of children: Not on file    Years of education: Not on file    Highest education level: Not on file   Occupational History    Occupation: not currently employed   Tobacco Use    Smoking status: Never Smoker    Smokeless tobacco: Never Used   Substance and Sexual Activity    Alcohol use: No       Alcohol/week: 0.0 standard drinks    Drug use:  No       Comment: none    Sexual activity: Not on file   Other Topics Concern    Not on file   Social History Narrative     Has a Master's degree     Lives alone      Social Determinants of Health          Financial Resource Strain: Low Risk     Difficulty of Paying Living Expenses: Not very hard   Food Insecurity: No Food Insecurity    Worried About Running Out of Food in the Last Year: Never true    Ran Out of Food in the Last Year: Never true   Transportation Needs: No Transportation Needs    Lack of Transportation (Medical): No    Lack of Transportation (Non-Medical): No   Physical Activity:     Days of Exercise per Week: Not on file    Minutes of Exercise per Session: Not on file   Stress:     Feeling of Stress : Not on file   Social Connections:     Frequency of Communication with Friends and Family: Not on file    Frequency of Social Gatherings with Friends and Family: Not on file    Attends [employer] Services: Not on file    Active Member of [address] [place] [address] or Organizations: Not on file    Attends Club or Organization Meetings: Not on file    Marital Status: Not on file   Intimate Partner Violence:     Fear of Current or Ex-Partner: Not on file    Emotionally Abused: Not on file    Physically Abused: Not on file    Sexually Abused: Not on file   Housing Stability:     Unable to Pay for Housing in the Last Year: Not on file    Number of [place] in the Last Year: Not on file    Unstable Housing in the Last Year: Not on file         Prior Level of Function/Work/Activity:  Independent with activity without pain  Out of work   Dominant Side:         RIGHT   Ambulatory/Rehab Services H2 Model Falls Risk Assessment   Risk Factors:       No Risk Factors Identified Ability to Rise from Chair:       (0)  Ability to rise in a single movement   Falls Prevention Plan:       No modifications necessary   Total: (5 or greater = High Risk): 0   ©2010 [facility] [place] [address] [place] [address] States Patent #[id number].  Federal Law prohibits the replication, distribution or use without written permission from [facility] [employer]   Current Medications:         Current Outpatient Medications:     soybean, fermented (Nattokinase) 50 mg cap, Take 50 mg by mouth two (2) times a day., Disp: , Rfl:     nystatin (MYCOSTATIN) 100,000 unit/mL suspension, Take  by mouth., Disp: , Rfl:     OTHER, 1 Capsule by Not Applicable route. Levocarnitine HCL, Disp: , Rfl:     ammonium lactate (LAC-HYDRIN) 12 % lotion, , Disp: , Rfl:     clindamycin (CLEOCIN T) 1 % lotion, , Disp: , Rfl:     montelukast (Singulair) 10 mg tablet, Take 10 mg by mouth nightly., Disp: , Rfl:     fexofenadine (Allegra Allergy) 180 mg tablet, Take  by mouth., Disp: , Rfl:     L-ORNITHINE PO, Take 100 g by mouth daily. L Aspartate 3 full teaspoons daily, Disp: , Rfl:     cholecalciferol, vitamin D3, (VITAMIN D3 PO), 5,000 Units. , Disp: , Rfl:     OTHER, 500 mg. Lactoferrin, Disp: , Rfl:     magnesium 200 mg tab, 200 mg. 3 caps, Disp: , Rfl:     MANGANESE PO, 10 mg daily. , Disp: , Rfl:     ascorbic acid, vitamin C, (Vitamin C) 1,000 mg tablet, , Disp: , Rfl:     calcium carbonate (CALCIUM 300 PO), , Disp: , Rfl:     Cyanocobalamin-Cobamamide (B12) 5,000-100 mcg lozg, , Disp: , Rfl:     OTHER, 1,800 mcg two (2) times a day. Methyl folate, Disp: , Rfl:     MOLYBDENUM, BULK,, 1,500 mcg daily. , Disp: , Rfl:     OTHER, 1,100 mg two (2) times a day. Nattokinese, Disp: , Rfl:     OTHER, Phospholipids, Disp: , Rfl:     OTHER, 500 mg.  Quercitin, Disp: , Rfl:     OTHER, Zinc/elva 15/1, Disp: , Rfl:     famotidine (PEPCID) 10 mg tablet, Take 20 mg by mouth two (2) times a day., Disp: , Rfl:     Lactobac 40-Bifido 3-S.thermop (Probiotic) 100 billion cell cap, , Disp: , Rfl:     levocarnitine HCl (ACETYL-L-CARNITINE), , Disp: , Rfl:     melatonin 1 mg subl, , Disp: , Rfl:     minocycline (Minocin) 100 mg capsule, , Disp: , Rfl:     fish oil-omega-3-DHA-EPA (Fish Oil) 300-1,000 mg capsule, , Disp: , Rfl:     organ concentrates (ADRENAL PO), , Disp: , Rfl:     prasterone, DHEA, (DHEA PO), , Disp: , Rfl:     resveratroL 250 mg cap, , Disp: , Rfl:     Saccharomyces boulardii (FLORASTOR) 250 mg capsule, , Disp: , Rfl:     Taurine 500 mg cap, , Disp: , Rfl:     tinidazole (TINDAMAX) 500 mg tablet, , Disp: , Rfl:     CoQ10, Ubiquinol, 200 mg cap, , Disp: , Rfl:     vitamin K2 100 mcg cap, , Disp: , Rfl:     Xylitol, Bulk, powd, , Disp: , Rfl:     tretinoin (RETIN-A) 0.05 % topical cream, APPLY THINLY TO FACE EVERY EVENING AS TOLERATED, Disp: , Rfl:     hydrOXYzine HCL (ATARAX) 50 mg tablet, TAKE 1/2 TO 1 TABLET BY MOUTH 30 MINUTES PRIOR TO SLEEP, Disp: , Rfl:     hydroquinone (ESOTERICA, MELQUIN) 4 % topical cream, APPLY TO DARK SPOTS 2 TIMES A DAY FOR 3 MONTHS, Disp: , Rfl:     estradioL (ESTRACE) 0.01 % (0.1 mg/gram) vaginal cream, Apply pea-sized amount (0.5 g) nightly x 2 weeks, then twice weekly, Disp: , Rfl:     timolol (TIMOPTIC) 0.5 % ophthalmic solution, INSTILL 1 DROP IN BOTH EYES DAILY; use day of surgery, Disp: , Rfl: 2    multivitamin (ONE A DAY) tablet, Take 1 Tab by mouth. Take 1 tab twice weekly, Disp: , Rfl:     mirtazapine (REMERON) 15 mg tablet, Take 3.2 mg by mouth nightly. Takes for pain and sleep, Disp: , Rfl:     rifabutin (MYCOBUTIN) 150 mg capsule, Take 1 Capsule by mouth two (2) times a day. 150mg MWF week 1 and MTW week 2, Disp: , Rfl:     gabapentin (NEURONTIN) 100 mg capsule, Take 300 mg by mouth nightly., Disp: , Rfl: 6    lorazepam (ATIVAN) 1 mg tablet, Take 0.25 mg by mouth nightly.  Indications: difficulty sleeping, Disp: , Rfl:    Date Last Reviewed:  3/31/2022   Number of Personal Factors/Comorbidities that affect the Plan of Care: 1-2: MODERATE COMPLEXITY   EXAMINATION:   Observation/Orthostatic Postural Assessment:          Pt has rounded shoulders and forward head lean        Pt guards L arm use to prevent pain        Slight right cervical tilt in sitting/laying  -Increased forward head and rounded shoulders posture  Palpation:          Increased tone on proximal wrist extensors        Superficial fascial tightness on wrist extensors distal to lateral

## 2022-07-07 NOTE — PROGRESS NOTES
Sarah Gutiérrez  MRN: [id number]  Aly Walters, MARA   Physical Therapist   Specialty:  Physical Therapy   Progress Notes      Signed   Date of Service:  2022 11:02 AM                    Fariha Walls  : 1960  Primary: Alex Oh Medicare Advantage  Secondary:  [address] at [facility]  [address] [place] [address]  [phone]   [phone]       OUTPATIENT PHYSICAL THERAPY: Daily Treatment Note 2022  Visit Count:  28     ICD-10: Treatment Diagnosis:   Lateral Epicondylitis, Left M77.12  Pain in joint, Left Elbow M25.522  Pain in joint, left shoulder, M25.512  Cervicalgia, M54.2  Precautions/Allergies:   Latex, natural rubber; Dexamethasone; and Other medication   TREATMENT PLAN:  Effective Dates: 22. Frequency/Duration: 1 time  MEDICAL/REFERRING DIAGNOSIS:  Lateral epicondylitis, left elbow [M77.12]  DATE OF ONSET: 2020  REFERRING PHYSICIAN:  Dr. Padmini Dial MD Orders: Evaluate and Treat  Return MD Appointment: TBD         Pre-treatment Symptoms/Complaints:  Pt reports that the neck has been ok and overall maintained well so far. She feels like she has had a plateau  Pain: Initial: Pain Intensity 1: 4/10 Post Session:  1/10   Medications Last Reviewed:  22  Updated Objective Findings:  Some suboccipital tightness right greater than left today. TREATMENT:      THERAPEUTIC EXERCISE: (0 minutes):  Exercises per grid below to improve mobility, strength and coordination. Required minimal visual, verbal and manual cues to promote proper body alignment, promote proper body posture and promote proper body mechanics.   Progressed resistance, range, repetitions and complexity of movement as indicated.    Date:  2022   Activity/Exercise Parameters   Wrist extensor stretch (not today) 5 x 30 sec   B shoulder ER 3 x 10   Scapular setting 2 x 10   Pt ed Scapular position   Pronation/Supination (not today) With red band today x 15 Prone on elbows (not today)     Sleeper stretch Left side lie 5 x 10 sec hold with self contract relax   First rib mobilization reviewed   Axial elongation With w-retractions x 10   Hip stretches Piriformis and gluts x 3 min and 1 min for hip flexors      MANUAL THERAPY: (45 minutes): Joint mobilization and Soft tissue mobilization was utilized and necessary because of the patient's restricted joint motion, loss of articular motion and restricted motion of soft tissue.   -Suboccipital release right with axial elongation   - soft tissue mobilization around clavicle, subscapularis, Latissimus Dorsi, wrist extensors, biceps, scalenes. - posterior/inferior glenohumeral glides   - scapular post depression/anterior elevation with post depression hold after contract/relax  -Cranial suture mobilization right side frontal and sagittal sutures and occiput     Modalities:   Ultrasound (0 min): 50% at 2 mHz for 8 min to right epicondyle.     Treatment/Session Summary:     · Response to Treatment:  Pt symptoms show some steady improvement.  Will discuss plan of care next session  · Communication/Consultation:  None today  · Equipment provided today:  None today  · Recommendations/Intent for next treatment session: Next visit will focus on shoulder ROM and function and isometric strengthening of wrist extensors.     Total Treatment Billable Duration:  45 minutes  PT Patient Time In/Time Out  Time In: 1012  Time Out: [address] [facility], PT

## 2022-07-21 ENCOUNTER — PATIENT MESSAGE (OUTPATIENT)
Dept: PRIMARY CARE CLINIC | Facility: CLINIC | Age: 62
End: 2022-07-21

## 2022-07-26 ENCOUNTER — TELEPHONE (OUTPATIENT)
Dept: SLEEP MEDICINE | Age: 62
End: 2022-07-26

## 2022-07-28 ENCOUNTER — HOSPITAL ENCOUNTER (OUTPATIENT)
Dept: LAB | Age: 62
Discharge: HOME | End: 2022-07-31

## 2022-07-28 PROCEDURE — 88305 TISSUE EXAM BY PATHOLOGIST: CPT

## 2022-07-28 NOTE — TELEPHONE ENCOUNTER
From: Ellie Cosby  To: Dr. Tutu Douglas: 7/21/2022 4:16 PM EDT  Subject: Need referral to sleep doc for follow up    I can't get through to them. The CPAP company has said that I need to see that provider again for insurance purposes and also b/c I am struggling with cpap/it is not yet effective for me. Can you please refer so they will call me to schedule and apt? Thank you. Also PCP apt conflict with colonoscopy 7/28 - still showing up as scheduled with you, although I have notified previously of need to change apt. Thank you.

## 2022-07-31 NOTE — PROGRESS NOTES
[facility], [address] [place] [address] Court, [address] [facility]  [phone]    Patient ID:  Name: Ellie Cosby  MRN: [id number]  AGE: 57 y.o.  : 1960          Office Visit 2022    CHIEF COMPLAINT:    Chief Complaint   Patient presents with    Sleep Apnea       HISTORY OF PRESENT ILLNESS:    Pt is a 57 y.o. female  with a history of MAXIMILIAN, chronic Lyme's disease, chronic back pain, and chronic fatigue. Pt had a PSG/HST on 10/12/21 with an AHI of 5.5/hr with desaturations to 86%. Pt is on CPAP 5-10 cm H2O. Pt is seen today for follow up. Pt reports that she is having trouble tolerating PAP therapy. She is using a dreamwear full face mask and she finds that she is waking up frequently to readjust her mask. She also finds that she is not rested in the morning. She is not sleeping well. She is frustrated and finds the thought of wearing a CPAP for the rest of her life daunting. She has been seeing Dr. Rivera Cleary, her dentist, and she is being evaluated for Vivos. This is an alternative treatment for MAXIMILIAN. She has had extensive xrays completed but is waiting on the procedure because it can cost upwards of $8,000 and she is on disability. She wants to make sure her insurance would cover this first.   She is also interested in a second opinion to ask if another dentist thinks this is a good idea. Pt is agreeable to see Dr. Candie Sanches to discuss this further. Pt reports that her mouth is very dry in the mornings. She has adjusted her humidity and temperature without improvement. She thinks she is a mouth breather. We discussed a trial of a new mask with a chin strap. She is agreeable. We reviewed her sleep apnea and her very mild MAXIMILIAN. We discussed positional therapy as well. Pt will try to sleep on her left side if possible.  I will also try decreasing her pressure to see if this helps with her mask leak and with her AHI as currently, her AHI is higher with treatment of MAXIMILIAN then it is without treatment. Pt reports that she had previously been followed by Dr. Ronnie Desouza, ENT, but he retired. She has had extensive sinus surgery in the past. She has been told by her dentist that she needs a phrenectomy. She saw Dr. Cande Lorenzo and didn't care for his personality per her report. She is scheduled to see Dr. Gena Ratliff next month for a second opinion. We discussed that he would be great to evaluate for any ENT treatment options for her mild MAXIMILIAN. She mentions that when she is getting ready for bed, she will feel like her airway is closing off even though she is awake. She does ask about speech therapy but was advised that we don't typically refer to ST for MAXIMILIAN. Pt has used APAP 43/46 days with an average use of 5 hrs and 49 mins per night. Pt has a mask leak of 22.3L/min with an AHI of 6.9/hr. I will decrease her pressure to 4-8cm H2O and have ordered a mask fit appt with a chin strap. ALLERGIES:   Allergies   Allergen Reactions    Dexamethasone Other (See Comments)     Infectious disease MD, Dr. Michel Dutta states that steroids adversely affect Lyme disease; do not use with anesthesia. Only use during an emergency.           Past Medical History:   Past Medical History:   Diagnosis Date    Anemia     Arthritis     Cholecystitis 8/28/2014    Chronic pain     Glaucoma     History of skin cancer     left lower leg    Multiple thyroid nodules     Obstructive sleep apnea     Osteoarthritis of lumbar spine     Osteopenia     Tennis elbow          Problem List:   Patient Active Problem List   Diagnosis    MAXIMILIAN on CPAP    Vitamin D deficiency    Restless legs    Glaucoma    DDD (degenerative disc disease), cervical    Restless leg syndrome    Cervical radiculopathy    Lyme disease    Osteopenia determined by x-ray    PLMD (periodic limb movement disorder)    Multiple thyroid nodules    Bradycardia    Burning with urination    Sinusitis    S/P sinus surgery    Primary insomnia    Tachycardia    Medicare annual wellness visit, subsequent    Left tennis elbow    Urinary tract infection without hematuria    Hypoxemia    Chronic fatigue    Suspected sleep apnea    Hyperlipidemia    Sleep-related hypoxia    Cervicalgia    Osteoarthritis of lumbar spine    Osteopenia    Myalgia    Allergic rhinitis due to allergen    Recurrent sinus infections    Hx of sinus surgery    Pharyngitis    Daytime sleepiness    Palpitations         Surgical History:   Past Surgical History:   Procedure Laterality Date    APPENDECTOMY  age 9    CATARACT REMOVAL Bilateral 12/2019    CHOLECYSTECTOMY  2014    [address]    SEPTOPLASTY  02/01/2018    WISDOM TOOTH EXTRACTION         Pulmonary Studies: No flowsheet data found.       Social History:   Social History     Socioeconomic History    Marital status: Single     Spouse name: Not on file    Number of children: Not on file    Years of education: Not on file    Highest education level: Not on file   Occupational History    Not on file   Tobacco Use    Smoking status: Never    Smokeless tobacco: Never   Substance and Sexual Activity    Alcohol use: No     Alcohol/week: 0.0 standard drinks    Drug use: No    Sexual activity: Not on file   Other Topics Concern    Not on file   Social History Narrative    Has a Master's degree  Lives alone       Social Determinants of Health     Financial Resource Strain: Not on file   Food Insecurity: Not on file   Transportation Needs: Not on file   Physical Activity: Not on file   Stress: Not on file   Social Connections: Not on file   Intimate Partner Violence: Not on file   Housing Stability: Not on file         Family History:   Family History   Problem Relation Age of Onset    High Cholesterol Mother     Diabetes Mother     Dementia Mother     Prostate Cancer Father         prostate met to bone, liver tumor    Colon Cancer Paternal Grandmother     Thyroid Disease Neg Hx     Thyroid Cancer Neg Hx     Colon Cancer (36.1 °C)   SpO2: 98%       Physical Exam:  GENERAL APPEARANCE:   The patient is normal weight and in no respiratory distress. on RA   HEENT:   PERRL. Conjunctivae unremarkable. Nasal mucosa is without epistaxis, exudate, or polyps. Gums and dentition are unremarkable. NECK/LYMPHATIC:   Symmetrical with no elevation of jugular venous pulsation. Trachea midline. No thyroid enlargement. No cervical adenopathy. LUNGS:   Normal respiratory effort with symmetrical lung expansion. Breath sounds clear. HEART:   There is a regular rate and rhythm. No murmur, rub, or gallop. There is no edema in the lower extremities. ABDOMEN:   Soft and non-tender. No hepatosplenomegaly. Bowel sounds are normal.     NEURO:   The patient is alert and oriented to person, place, and time. Memory appears intact and mood is normal.  No gross sensorimotor deficits are present. ASSESSMENT:         Diagnosis Orders   1. MAXIMILIAN (obstructive sleep apnea) -pt's PAP pressure was decreased to 4-8cm H2O and I have ordered a mask fit and chin strap for her to try. I will also refer her to [facility] for a second opinion. I will make sure that Dr. Zee Nichols has my note to review prior to her appt next month. External Referral To Dentistry    [facility]      2. Difficulty using continuous positive airway pressure (CPAP) device -pt is having a hard time. Using PAP therapy. She has very mild MAXIMILIAN and I think that is not helping. I have recommended positional therapy vs an oral appliance as an alternate treatment. External Referral To Dentistry             Medical Decision Making:       Orders:   Orders Placed This Encounter   Procedures    External Referral To Dentistry    [facility]        Dr. Naye Tripathi was the onsite physician. He was available to answer any questions.      Plan:  Follow up in the sleep center in 3 months with MD    Time spent in preparation, chart review, imaging review and direct patient care was 42 minutes         Mitesh Cortés [place]  8/2/2022,    Electronically signed

## 2022-08-02 ENCOUNTER — OFFICE VISIT (OUTPATIENT)
Dept: SLEEP MEDICINE | Age: 62
End: 2022-08-02
Payer: MEDICARE

## 2022-08-02 VITALS
HEIGHT: 67 IN | OXYGEN SATURATION: 98 % | DIASTOLIC BLOOD PRESSURE: 82 MMHG | SYSTOLIC BLOOD PRESSURE: 124 MMHG | BODY MASS INDEX: 21.35 KG/M2 | WEIGHT: 136 LBS | TEMPERATURE: 97 F | HEART RATE: 51 BPM | RESPIRATION RATE: 14 BRPM

## 2022-08-02 DIAGNOSIS — G47.33 OSA (OBSTRUCTIVE SLEEP APNEA): Primary | ICD-10-CM

## 2022-08-02 DIAGNOSIS — Z78.9 DIFFICULTY USING CONTINUOUS POSITIVE AIRWAY PRESSURE (CPAP) DEVICE: ICD-10-CM

## 2022-08-02 PROCEDURE — 99215 OFFICE O/P EST HI 40 MIN: CPT | Performed by: PHYSICIAN ASSISTANT

## 2022-08-02 RX ORDER — MINOCYCLINE HYDROCHLORIDE 100 MG/1
CAPSULE ORAL
COMMUNITY
Start: 2020-01-01

## 2022-08-02 RX ORDER — VALACYCLOVIR HYDROCHLORIDE 1 G/1
TABLET, FILM COATED ORAL DAILY
COMMUNITY
Start: 2022-08-01

## 2022-08-02 ASSESSMENT — SLEEP AND FATIGUE QUESTIONNAIRES
HOW LIKELY ARE YOU TO NOD OFF OR FALL ASLEEP WHILE SITTING AND READING: 0
HOW LIKELY ARE YOU TO NOD OFF OR FALL ASLEEP IN A CAR, WHILE STOPPED FOR A FEW MINUTES IN TRAFFIC: 0
HOW LIKELY ARE YOU TO NOD OFF OR FALL ASLEEP WHILE SITTING INACTIVE IN A PUBLIC PLACE: 0
ESS TOTAL SCORE: 1
HOW LIKELY ARE YOU TO NOD OFF OR FALL ASLEEP WHEN YOU ARE A PASSENGER IN A CAR FOR AN HOUR WITHOUT A BREAK: 0
HOW LIKELY ARE YOU TO NOD OFF OR FALL ASLEEP WHILE SITTING QUIETLY AFTER LUNCH WITHOUT ALCOHOL: 0
HOW LIKELY ARE YOU TO NOD OFF OR FALL ASLEEP WHILE WATCHING TV: 0
HOW LIKELY ARE YOU TO NOD OFF OR FALL ASLEEP WHILE LYING DOWN TO REST IN THE AFTERNOON WHEN CIRCUMSTANCES PERMIT: 1
HOW LIKELY ARE YOU TO NOD OFF OR FALL ASLEEP WHILE SITTING AND TALKING TO SOMEONE: 0

## 2022-08-02 NOTE — PATIENT INSTRUCTIONS
Positional therapy was recommended. Some people have sleep apnea primarily when sleeping on their back. This is called the supine position. Their breathing returns to normal when they sleep on their side. Positional therapy may involve wearing a special device around your waist or back. It keeps you sleeping in the side position. A tennis ball or ping pong ball placed in a sew-in pocket on the back of a t-shirt is helpful to assist with sleeping on your right or left side. The Zzoma device is an assistive device used for positional therapy. Wedge pillows can also be useful.

## 2022-08-19 ENCOUNTER — OFFICE VISIT (OUTPATIENT)
Dept: PRIMARY CARE CLINIC | Facility: CLINIC | Age: 62
End: 2022-08-19
Payer: MEDICARE

## 2022-08-19 VITALS
BODY MASS INDEX: 20.88 KG/M2 | WEIGHT: 133 LBS | HEIGHT: 67 IN | OXYGEN SATURATION: 96 % | DIASTOLIC BLOOD PRESSURE: 53 MMHG | TEMPERATURE: 97 F | SYSTOLIC BLOOD PRESSURE: 119 MMHG | HEART RATE: 50 BPM

## 2022-08-19 DIAGNOSIS — G47.33 OBSTRUCTIVE SLEEP APNEA (ADULT) (PEDIATRIC): ICD-10-CM

## 2022-08-19 DIAGNOSIS — R00.1 BRADYCARDIA, UNSPECIFIED: ICD-10-CM

## 2022-08-19 DIAGNOSIS — M54.2 CERVICALGIA: ICD-10-CM

## 2022-08-19 DIAGNOSIS — M85.80 OSTEOPENIA, UNSPECIFIED LOCATION: ICD-10-CM

## 2022-08-19 DIAGNOSIS — S73.102S HIP SPRAIN, LEFT, SEQUELA: ICD-10-CM

## 2022-08-19 DIAGNOSIS — J30.89 OTHER ALLERGIC RHINITIS: ICD-10-CM

## 2022-08-19 DIAGNOSIS — E55.9 VITAMIN D DEFICIENCY, UNSPECIFIED: ICD-10-CM

## 2022-08-19 DIAGNOSIS — E78.5 HYPERLIPIDEMIA, UNSPECIFIED HYPERLIPIDEMIA TYPE: Primary | ICD-10-CM

## 2022-08-19 DIAGNOSIS — Z86.19 HX OF LYME DISEASE: ICD-10-CM

## 2022-08-19 PROCEDURE — 99214 OFFICE O/P EST MOD 30 MIN: CPT | Performed by: FAMILY MEDICINE

## 2022-08-19 RX ORDER — AMMONIUM LACTATE 12 G/100G
LOTION TOPICAL
COMMUNITY
Start: 2021-10-27

## 2022-08-19 RX ORDER — TAURINE 500 MG
CAPSULE ORAL
COMMUNITY

## 2022-08-19 RX ORDER — CHLORAL HYDRATE 500 MG
1 CAPSULE ORAL DAILY
COMMUNITY
Start: 2013-01-01

## 2022-08-19 RX ORDER — AMPICILLIN TRIHYDRATE 250 MG
CAPSULE ORAL
COMMUNITY

## 2022-08-19 RX ORDER — TINIDAZOLE 500 MG/1
500 TABLET ORAL
COMMUNITY

## 2022-08-19 RX ORDER — CLINDAMYCIN PHOSPHATE 10 UG/ML
LOTION TOPICAL
COMMUNITY
Start: 2021-10-27

## 2022-08-19 RX ORDER — MAGNESIUM 200 MG
600 TABLET ORAL 2 TIMES DAILY
COMMUNITY
Start: 2020-01-01

## 2022-08-19 RX ORDER — SACCHAROMYCES BOULARDII 250 MG
250 CAPSULE ORAL DAILY
COMMUNITY

## 2022-08-19 RX ORDER — MULTIVIT WITH MINERALS/LUTEIN
1000 TABLET ORAL DAILY
COMMUNITY

## 2022-08-19 RX ORDER — XYLITOL
POWDER (GRAM) MISCELLANEOUS
COMMUNITY

## 2022-08-19 SDOH — ECONOMIC STABILITY: FOOD INSECURITY: WITHIN THE PAST 12 MONTHS, YOU WORRIED THAT YOUR FOOD WOULD RUN OUT BEFORE YOU GOT MONEY TO BUY MORE.: NEVER TRUE

## 2022-08-19 SDOH — ECONOMIC STABILITY: FOOD INSECURITY: WITHIN THE PAST 12 MONTHS, THE FOOD YOU BOUGHT JUST DIDN'T LAST AND YOU DIDN'T HAVE MONEY TO GET MORE.: NEVER TRUE

## 2022-08-19 SDOH — ECONOMIC STABILITY: TRANSPORTATION INSECURITY
IN THE PAST 12 MONTHS, HAS LACK OF TRANSPORTATION KEPT YOU FROM MEETINGS, WORK, OR FROM GETTING THINGS NEEDED FOR DAILY LIVING?: NO

## 2022-08-19 SDOH — ECONOMIC STABILITY: TRANSPORTATION INSECURITY
IN THE PAST 12 MONTHS, HAS THE LACK OF TRANSPORTATION KEPT YOU FROM MEDICAL APPOINTMENTS OR FROM GETTING MEDICATIONS?: NO

## 2022-08-19 ASSESSMENT — LIFESTYLE VARIABLES
HOW OFTEN DO YOU HAVE A DRINK CONTAINING ALCOHOL: NEVER
HOW MANY STANDARD DRINKS CONTAINING ALCOHOL DO YOU HAVE ON A TYPICAL DAY: PATIENT DOES NOT DRINK

## 2022-08-19 ASSESSMENT — PATIENT HEALTH QUESTIONNAIRE - PHQ9
SUM OF ALL RESPONSES TO PHQ QUESTIONS 1-9: 0
2. FEELING DOWN, DEPRESSED OR HOPELESS: 0
SUM OF ALL RESPONSES TO PHQ9 QUESTIONS 1 & 2: 0
SUM OF ALL RESPONSES TO PHQ QUESTIONS 1-9: 0
1. LITTLE INTEREST OR PLEASURE IN DOING THINGS: 0
SUM OF ALL RESPONSES TO PHQ QUESTIONS 1-9: 0
SUM OF ALL RESPONSES TO PHQ QUESTIONS 1-9: 0

## 2022-08-19 ASSESSMENT — SOCIAL DETERMINANTS OF HEALTH (SDOH): HOW HARD IS IT FOR YOU TO PAY FOR THE VERY BASICS LIKE FOOD, HOUSING, MEDICAL CARE, AND HEATING?: NOT HARD AT ALL

## 2022-08-19 NOTE — PROGRESS NOTES
[address] [facility], [address]  Office : [phone]  Fax : [phone]      Subjective: The patient is a 57 y.o. female  who presents for f/u on multiple chronic medical conditions-good compliance with medications -pt here to get routeine labs and need refill on meds. no cardiopulmonary symptoms  Hyprlipidemia-stable on diet  CHRONIC FATIGUE from hx of lymes disease--sees specialist in [facility] twice a year-on pulse abx and several supplements  Insomnia-on meds as needed  Osteopenia--on vit d /ca supplement  Left tennis elbow--sees ortho  Palpitations-sees cardiologist-so far work up neg  Dsmpulmdrcs-jgu-qhw appt with spine clinic soon-ddd also causing chronic headaches  Right sided headache-on valtrex now-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  Chronic left hip pain/arthritis vs sprain-doing PT-helps      Patient Active Problem List   Diagnosis Code    Lyme disease A69.20    Glaucoma H40.9    PLMD (periodic limb movement disorder) G47.61    Daytime sleepiness R40.0    S/P sinus surgery Z98.890    Osteoarthritis of lumbar spine M47.816    Osteopenia M85.80    Multiple thyroid nodules E04.2    Chronic fatigue R53.82    Medicare annual wellness visit, subsequent Z00.00    Palpitations R00.2    Tachycardia R00.0    Primary insomnia F51.01    Left tennis elbow M77.12    Myalgia M79.10    Burning with urination R30.0    Urinary tract infection without hematuria N39.0    Vitamin D deficiency E55.9    Hx of sinus surgery Z98.890    Pharyngitis J02.9    Sinusitis J32.9    Osteopenia determined by x-ray M85.80    Recurrent sinus infections J32.9    Suspected sleep apnea R29.818    Bradycardia R00.1    Allergic rhinitis due to allergen J30.9    Sleep-related hypoxia G47.34    Restless legs G25.81    Hypoxemia R09.02    Cervicalgia M54.2    DDD (degenerative disc disease), cervical M50.30    Cervical radiculopathy M54.12    MAXIMILIAN on CPAP G47.33, Z99.89    Restless leg syndrome G25.81 Screening for colon cancer Z12.11    Hyperlipidemia E78.5       Past Medical History:   Diagnosis Date    Anemia     Arthritis     Cholecystitis 8/28/2014    Chronic pain     Glaucoma     History of skin cancer     left lower leg    Multiple thyroid nodules     Obstructive sleep apnea     Osteoarthritis of lumbar spine     Osteopenia     Tennis elbow        Past Surgical History:   Procedure Laterality Date    HX APPENDECTOMY  age 9    HX CATARACT REMOVAL Bilateral 12/2019    HX CHOLECYSTECTOMY  2014    HX COLONOSCOPY      HX MAXILLOFACIAL      HX RHINOPLASTY  1990    HX SEPTOPLASTY  02/01/2018    HX VASCULAR ACCESS      port for antibiotics/ \"chronic Lyme disease\"    HX WISDOM TEETH EXTRACTION         Social History     Socioeconomic History    Marital status: SINGLE     Spouse name: Not on file    Number of children: Not on file    Years of education: Not on file    Highest education level: Not on file   Occupational History    Occupation: not currently employed   Tobacco Use    Smoking status: Never Smoker    Smokeless tobacco: Never Used   Substance and Sexual Activity    Alcohol use: No     Alcohol/week: 0.0 standard drinks    Drug use: No     Comment: none    Sexual activity: Not on file   Other Topics Concern    Not on file   Social History Narrative    Has a Master's degree    Lives alone     Social Determinants of Health     Financial Resource Strain: Low Risk     Difficulty of Paying Living Expenses: Not very hard   Food Insecurity: No Food Insecurity    Worried About Running Out of Food in the Last Year: Never true    [address] in the Last Year: Never true   Transportation Needs: No Transportation Needs    Lack of Transportation (Medical): No    Lack of Transportation (Non-Medical):  No   Physical Activity:     Days of Exercise per Week: Not on file    Minutes of Exercise per Session: Not on file   Stress:     Feeling of Stress : Not on file   Social Connections:     Frequency of Communication with Friends and Family: Not on file    Frequency of Social Gatherings with Friends and Family: Not on file    Attends [employer] Services: Not on file    Active Member of Clubs or Organizations: Not on file    Attends Club or Organization Meetings: Not on file    Marital Status: Not on file   Intimate Partner Violence:     Fear of Current or Ex-Partner: Not on file    Emotionally Abused: Not on file    Physically Abused: Not on file    Sexually Abused: Not on file   Housing Stability:     Unable to Pay for Housing in the Last Year: Not on file    Number of [place] in the Last Year: Not on file    Unstable Housing in the Last Year: Not on file       Allergies   Allergen Reactions    Latex, Natural Rubber Rash    Dexamethasone Other (comments)     Infectious disease MD, Dr. Mariella Daniels states that steroids adversely affect Lyme disease; do not use with anesthesia. Only use during an emergency. Other Medication Itching     IV anitbiotic~can't remember which one~she had to premedicate with Benadryl       Current Outpatient Medications   Medication Sig Dispense Refill    soybean, fermented (Nattokinase) 50 mg cap Take 50 mg by mouth two (2) times a day. nystatin (MYCOSTATIN) 100,000 unit/mL suspension Take  by mouth. OTHER 1 Capsule by Not Applicable route. Levocarnitine HCL      ammonium lactate (LAC-HYDRIN) 12 % lotion       clindamycin (CLEOCIN T) 1 % lotion       montelukast (Singulair) 10 mg tablet Take 10 mg by mouth nightly. fexofenadine (Allegra Allergy) 180 mg tablet Take  by mouth. L-ORNITHINE PO Take 100 g by mouth daily. L Aspartate  3 full teaspoons daily      cholecalciferol, vitamin D3, (VITAMIN D3 PO) 5,000 Units. OTHER 500 mg. Lactoferrin      magnesium 200 mg tab 200 mg. 3 caps      MANGANESE PO 10 mg daily.       ascorbic acid, vitamin C, (Vitamin C) 1,000 mg tablet       calcium carbonate (CALCIUM 300 PO)       Cyanocobalamin-Cobamamide (B12) 5,000-100 mcg lozg       OTHER 1,800 mcg two (2) times a day. Methyl folate      MOLYBDENUM, BULK, 1,500 mcg daily. OTHER 1,100 mg two (2) times a day. Nattokinese      OTHER Phospholipids      OTHER 500 mg. Quercitin      OTHER Zinc/elva 15/1      famotidine (PEPCID) 10 mg tablet Take 20 mg by mouth two (2) times a day. Lactobac 40-Bifido 3-S.thermop (Probiotic) 100 billion cell cap       levocarnitine HCl (ACETYL-L-CARNITINE)       melatonin 1 mg subl       minocycline (Minocin) 100 mg capsule       fish oil-omega-3-DHA-EPA (Fish Oil) 300-1,000 mg capsule       organ concentrates (ADRENAL PO)       prasterone, DHEA, (DHEA PO)       resveratroL 250 mg cap       Saccharomyces boulardii (FLORASTOR) 250 mg capsule       Taurine 500 mg cap       tinidazole (TINDAMAX) 500 mg tablet       CoQ10, Ubiquinol, 200 mg cap       vitamin K2 100 mcg cap       Xylitol, Bulk, powd       tretinoin (RETIN-A) 0.05 % topical cream APPLY THINLY TO FACE EVERY EVENING AS TOLERATED      hydrOXYzine HCL (ATARAX) 50 mg tablet TAKE 1/2 TO 1 TABLET BY MOUTH 30 MINUTES PRIOR TO SLEEP      hydroquinone (ESOTERICA, MELQUIN) 4 % topical cream APPLY TO DARK SPOTS 2 TIMES A DAY FOR 3 MONTHS      estradioL (ESTRACE) 0.01 % (0.1 mg/gram) vaginal cream Apply pea-sized amount (0.5 g) nightly x 2 weeks, then twice weekly      timolol (TIMOPTIC) 0.5 % ophthalmic solution INSTILL 1 DROP IN BOTH EYES DAILY; use day of surgery  2    multivitamin (ONE A DAY) tablet Take 1 Tab by mouth. Take 1 tab twice weekly      mirtazapine (REMERON) 15 mg tablet Take 3.2 mg by mouth nightly. Takes for pain and sleep      rifabutin (MYCOBUTIN) 150 mg capsule Take 1 Capsule by mouth two (2) times a day. 150mg MWF week 1 and MTW week 2      gabapentin (NEURONTIN) 100 mg capsule Take 300 mg by mouth nightly. 6    lorazepam (ATIVAN) 1 mg tablet Take 0.25 mg by mouth nightly. Indications: difficulty sleeping           Review of Systems   Constitutional: Positive for malaise/fatigue. HENT: Negative. Eyes: Negative. Respiratory: Negative. Gastrointestinal: Negative. Genitourinary: Negative. Musculoskeletal: Positive for joint pain, myalgias and neck pain. Skin: Negative. Neurological: Negative. Endo/Heme/Allergies: Negative. Psychiatric/Behavioral: The patient has insomnia. Objective:    Visit Vitals  /65 (BP 1 Location: Left upper arm, BP Patient Position: Sitting, BP Cuff Size: Adult)   Pulse (!) 56   Temp 97 °F (36.1 °C) (Temporal)   Resp 16   Ht 5' 7\" (1.702 m)   Wt 140 lb (63.5 kg)   SpO2 97%   BMI 21.93 kg/m²       Physical Exam  Constitutional:       Appearance: She is well-developed. HENT:      Head: Normocephalic and atraumatic. Right Ear: External ear normal.      Left Ear: External ear normal.      Nose: Nose normal.   Eyes:      Conjunctiva/sclera: Conjunctivae normal.      Pupils: Pupils are equal, round, and reactive to light. Cardiovascular:      Rate and Rhythm: Normal rate and regular rhythm. Heart sounds: Normal heart sounds. Pulmonary:      Effort: Pulmonary effort is normal.      Breath sounds: Normal breath sounds. Abdominal:      General: Bowel sounds are normal.      Palpations: Abdomen is soft. Musculoskeletal:         General: Normal range of motion. Cervical back: Normal range of motion and neck supple. Skin:     General: Skin is warm and dry. Neurological:      Mental Status: She is alert and oriented to person, place, and time. Deep Tendon Reflexes: Reflexes are normal and symmetric. Psychiatric:         Behavior: Behavior normal.         Thought Content: Thought content normal.         Judgment: Judgment normal.           . RESULTRCNTNC(15W    ASSESSMENT/PLAN:    1. Hyperlipidemia, unspecified hyperlipidemia type  -     TSH; Future  -     T4, Free; Future  -     Lipid Panel; Future  2. Osteopenia, unspecified location  Overview:  dexa 9/2021  On vit d supplement    Orders:  -     TSH;  Future  -     T4, Free; Future  -     Lipid Panel; Future  3. Hx of Lyme disease  Comments:  [place]  Orders:  -     TSH; Future  -     T4, Free; Future  -     Lipid Panel; Future  4. Vitamin D deficiency, unspecified  Overview:  On daily supplement      Orders:  -     TSH; Future  -     T4, Free; Future  -     Lipid Panel; Future  5. Other allergic rhinitis  -     TSH; Future  -     T4, Free; Future  -     Lipid Panel; Future  6. Cervicalgia  Overview:  MRI c spine done at [facility]-scanned in system-MEDIA  mild spinal stenisis      Orders:  -     TSH; Future  -     T4, Free; Future  -     Lipid Panel; Future  7. Obstructive sleep apnea (adult) (pediatric)  -     TSH; Future  -     T4, Free; Future  -     Lipid Panel; Future  8. Bradycardia, unspecified  Overview:  asymptomatic      Orders:  -     TSH; Future  -     T4, Free; Future  -     Lipid Panel; Future  9. Hip sprain, left, sequela  Comments:  refused x-ray  supportive care  sees PT  Overview:  refused x-ray  supportive care  sees PT     Orders Placed This Encounter   Procedures    TSH     Standing Status:   Future     Standing Expiration Date:   8/19/2023    T4, Free     Standing Status:   Future     Standing Expiration Date:   8/19/2023    Lipid Panel     Standing Status:   Future     Standing Expiration Date:   8/19/2023     Order Specific Question:   Is Patient Fasting?/# of Hours     Answer:   0    No orders of the defined types were placed in this encounter. Results for orders placed or performed in visit on 09/06/21    MAMMOGRAPHY   Result Value Ref Range    Mammography, External         XR Results (maximum last 3): Results from [place] encounter on 12/13/16    XR SPINE LUMB MIN 4 V    Narrative  History: Low back pain without injury. Degenerative disc disease. Pain. CERVICAL SPINE 8 VIEWS: The cervical vertebrae are normal in height and  alignment. Degenerative disc disease is present at C5-C6.  There is no  significant subluxation on flexion or extension views. Oblique images  demonstrate appropriate overlap of the articular facets. There is osseous  narrowing of the left neural foramen at C5-C6. LUMBAR SPINE 5 VIEWS: The lumbar vertebrae are normal in height and alignment. There are no pars defects. Facet degenerative change is present at L5-S1. Degenerative disc disease is present with osteophyte formation anteriorly at  L2-L3 and L3-L4. Impression  IMPRESSION:    1. Degenerative disc disease with left foraminal stenosis at C5-C6. 2. Mild lumbar degenerative disc disease at L2-L3 and L3-L4. XR SPINE CERV W OBL/FLEX/EXT MIN 6 V COMP    Narrative  History: Low back pain without injury. Degenerative disc disease. Pain. CERVICAL SPINE 8 VIEWS: The cervical vertebrae are normal in height and  alignment. Degenerative disc disease is present at C5-C6. There is no  significant subluxation on flexion or extension views. Oblique images  demonstrate appropriate overlap of the articular facets. There is osseous  narrowing of the left neural foramen at C5-C6. LUMBAR SPINE 5 VIEWS: The lumbar vertebrae are normal in height and alignment. There are no pars defects. Facet degenerative change is present at L5-S1. Degenerative disc disease is present with osteophyte formation anteriorly at  L2-L3 and L3-L4. Impression  IMPRESSION:    1. Degenerative disc disease with left foraminal stenosis at C5-C6. 2. Mild lumbar degenerative disc disease at L2-L3 and L3-L4. Results from Office Visit encounter on 09/28/16    XR CHEST PA LAT      CT Results (maximum last 3): Results from [place] encounter on 12/27/17    CT MAXILLOFACIAL WO CONT    Narrative  CT sinuses    INDICATION: Chronic sinusitis, presurgical planning    Multiple high resolution axial images were obtained through the paranasal  sinuses. Coronal reformats were also evaluated. Radiation dose reduction  techniques were used for this study:   All CT scans performed at this facility  use one or all of the following: Automated exposure control, adjustment of the  mA and/or kVp according to patient's size, iterative reconstruction. FINDINGS: The sinuses are clear. No masses or fluid collections are seen. There  is no significant mucoperiosteal thickening. The maxillary ostia are widely  patent. There are no bony lesions. There is no significant septal deviation. Impression  IMPRESSION: Unremarkable CT of the sinuses      Results from Abstract encounter on 11/08/17    CT HEAD W CONT      Results from Abstract encounter on 10/25/17    CT SINUSES WO CONT      MRI Results (maximum last 3): Results from Abstract encounter on 12/23/21    MRI BRAIN W WO CONT      MRI CERV SPINE W WO CONT      Nuclear Medicine Results (maximum last 3): No results found for this or any previous visit. US Results (maximum last 3):      DEXA Results (maximum last 3): LETICIA Results (maximum last 3): Results from Abstract encounter on 11/09/15    LETICIA MAMMOGRAM SCREEN BILAT      IR Results (maximum last 3): No results found for this or any previous visit. VAS/US Results (maximum last 3): No results found for this or any previous visit. PET Results (maximum last 3): No results found for this or any previous visit. I  have done counseling/anticipatory guidance and  Done risk factor reduction interventions discussion today . We discussed the expected course, resolution and complications of the diagnosis(es) in detail. Medication risks, benefits, costs, interactions, and alternatives were discussed as indicated. I advised her to contact the office if her condition worsens, changes or fails to improve as anticipated. She expressed understanding with the diagnosis(es) and plan. Follow-up and Dispositions    Return in about 6 months         Kaykay Fraser MD

## 2022-08-22 ENCOUNTER — PATIENT MESSAGE (OUTPATIENT)
Dept: PRIMARY CARE CLINIC | Facility: CLINIC | Age: 62
End: 2022-08-22

## 2022-08-22 NOTE — TELEPHONE ENCOUNTER
From: Clementine Miramontes  To: Dr. Jones Lias: 8/22/2022 4:15 PM EDT  Subject: Not really a question    Hi Dr. Johnie Hart -   ID/lyme doc recomendations after bloodwork include:     copper continues to be low - try liposomal copper supplement  zinc low normal= try liposomal zinc supplement  low Co10 = continue Ubiquinol 300mg daily     FYI - they checked TSH:  TSH   1.92  Reference Range: 0.40-4.50 mIU/L  T4, FREE   1.0  Reference Range: 0.8-1.8 ng/dL

## 2023-01-12 RX ORDER — IVERMECTIN 3 MG/1
TABLET ORAL WEEKLY
COMMUNITY

## 2023-01-12 RX ORDER — FAMOTIDINE 20 MG/1
20 TABLET, FILM COATED ORAL 2 TIMES DAILY
COMMUNITY

## 2023-01-12 NOTE — PERIOP NOTE
Phone pre-assessment completed. Verified name &  . Order to obtain consent not found in EHR. Type 1b surgery,  assessment complete. Orders not received. Labs per surgeon: [facility] per anesthesia protocol: HGB ON ARRIVAL      Medical/surgical history questions answered at their best of ability. All prior to admission medications reviewed and documented in [facility]. Instructed to take ONLY THE FOLLOWING MEDICATIONS ON THE DAY OF SURGERY according to anesthesia guidelines with sips of water: ativan, allegra, famotidine . VERBALIZES UNDERSTANDING TO HOLD ALL VITAMINS AND SUPPLEMENTS 7 DAYS PRIOR TO SURGERY DATE (with exception to Renal Vitamins) and NSAIDS 5 DAYS PRIOR TO SURGERY DATE PER ANESTHESIA PROTOCOL. Instructed on the following:    > Arrive at [facility], time of arrival to be called the day before by 1700  > NPO after midnight including gum, mints, and ice chips  > Responsible adult must drive patient to the hospital, stay during surgery, and patient will need supervision 24 hours after anesthesia  > Use antibacterial soap in shower the night before surgery and on the morning of surgery  > All piercings must be removed prior to arrival.    > Leave all valuables (money and jewelry) at home but bring insurance card and ID on DOS.   > You may be required to pay a deductible or co-pay on the day of your procedure. You can pre-pay by calling [phone] if your surgery is at the [facility] or [phone] if your surgery is at the [facility]. > Do not wear make-up, nail polish, lotions, cologne, perfumes, powders, or oil on skin. Artificial nails are not permitted. Teach back successful and demonstrates knowledge of instruction. You will received a call from the pre-op nurse by 5 pm on the business day prior to the scheduled procedure. If you have not spoken with a nurse, please check your voicemail.  If you have not received an arrival time by 5 pm, please call [phone].

## 2023-01-13 ENCOUNTER — OFFICE VISIT (OUTPATIENT)
Dept: PRIMARY CARE CLINIC | Facility: CLINIC | Age: 63
End: 2023-01-13
Payer: MEDICARE

## 2023-01-13 VITALS
BODY MASS INDEX: 21.19 KG/M2 | HEIGHT: 67 IN | WEIGHT: 135 LBS | OXYGEN SATURATION: 95 % | HEART RATE: 55 BPM | DIASTOLIC BLOOD PRESSURE: 65 MMHG | TEMPERATURE: 97 F | SYSTOLIC BLOOD PRESSURE: 122 MMHG

## 2023-01-13 DIAGNOSIS — N39.0 URINARY TRACT INFECTION WITHOUT HEMATURIA, SITE UNSPECIFIED: Primary | ICD-10-CM

## 2023-01-13 DIAGNOSIS — Z86.19 HX OF LYME DISEASE: ICD-10-CM

## 2023-01-13 DIAGNOSIS — E78.5 HYPERLIPIDEMIA, UNSPECIFIED HYPERLIPIDEMIA TYPE: ICD-10-CM

## 2023-01-13 DIAGNOSIS — M85.80 OSTEOPENIA, UNSPECIFIED LOCATION: ICD-10-CM

## 2023-01-13 DIAGNOSIS — E55.9 VITAMIN D DEFICIENCY, UNSPECIFIED: ICD-10-CM

## 2023-01-13 DIAGNOSIS — R35.0 FREQUENT URINATION: ICD-10-CM

## 2023-01-13 LAB
BILIRUBIN, URINE, POC: NEGATIVE
BLOOD URINE, POC: NEGATIVE
GLUCOSE URINE, POC: NEGATIVE
KETONES, URINE, POC: NEGATIVE
LEUKOCYTE ESTERASE, URINE, POC: NEGATIVE
NITRITE, URINE, POC: NEGATIVE
PH, URINE, POC: 5.5 (ref 4.6–8)
PROTEIN,URINE, POC: NEGATIVE
SPECIFIC GRAVITY, URINE, POC: 1 (ref 1–1.03)
URINALYSIS CLARITY, POC: CLEAR
URINALYSIS COLOR, POC: YELLOW
UROBILINOGEN, POC: NORMAL

## 2023-01-13 PROCEDURE — 81003 URINALYSIS AUTO W/O SCOPE: CPT | Performed by: FAMILY MEDICINE

## 2023-01-13 PROCEDURE — 99214 OFFICE O/P EST MOD 30 MIN: CPT | Performed by: FAMILY MEDICINE

## 2023-01-13 RX ORDER — CIPROFLOXACIN 500 MG/1
500 TABLET, FILM COATED ORAL 2 TIMES DAILY
Qty: 14 TABLET | Refills: 0 | Status: SHIPPED | OUTPATIENT
Start: 2023-01-13 | End: 2023-01-20

## 2023-01-13 ASSESSMENT — PATIENT HEALTH QUESTIONNAIRE - PHQ9
SUM OF ALL RESPONSES TO PHQ QUESTIONS 1-9: 0
1. LITTLE INTEREST OR PLEASURE IN DOING THINGS: 0
SUM OF ALL RESPONSES TO PHQ QUESTIONS 1-9: 0
SUM OF ALL RESPONSES TO PHQ9 QUESTIONS 1 & 2: 0
2. FEELING DOWN, DEPRESSED OR HOPELESS: 0
SUM OF ALL RESPONSES TO PHQ QUESTIONS 1-9: 0
SUM OF ALL RESPONSES TO PHQ QUESTIONS 1-9: 0

## 2023-01-13 NOTE — PROGRESS NOTES
[address] [facility], [address]  Office : [phone]  Fax : [phone]      Subjective: The patient is a 57 y.o. female  who presents for f/u on 3 days hx of urinary frequency and dysuria  Pt denies abdominal pain/N/V/fever or chills  Pt took leftover cipro for a day and feels much better -still has symptoms  No blood in urine    multiple chronic medical conditions-good compliance with medications -pt here to get routeine labs and need refill on meds. no cardiopulmonary symptoms  Hyprlipidemia-stable on diet  Vitamin D deficiency-on supplement  CHRONIC FATIGUE from hx of lymes disease--sees specialist in 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  Chronic left hip pain/arthritis vs sprain-doing PT-helps      Patient Active Problem List   Diagnosis Code    Lyme disease A69.20    Glaucoma H40.9    PLMD (periodic limb movement disorder) G47.61    Daytime sleepiness R40.0    S/P sinus surgery Z98.890    Osteoarthritis of lumbar spine M47.816    Osteopenia M85.80    Multiple thyroid nodules E04.2    Chronic fatigue R53.82    Medicare annual wellness visit, subsequent Z00.00    Palpitations R00.2    Tachycardia R00.0    Primary insomnia F51.01    Left tennis elbow M77.12    Myalgia M79.10    Burning with urination R30.0    Urinary tract infection without hematuria N39.0    Vitamin D deficiency E55.9    Hx of sinus surgery Z98.890    Pharyngitis J02.9    Sinusitis J32.9    Osteopenia determined by x-ray M85.80    Recurrent sinus infections J32.9    Suspected sleep apnea R29.818    Bradycardia R00.1    Allergic rhinitis due to allergen J30.9    Sleep-related hypoxia G47.34    Restless legs G25.81    Hypoxemia R09.02    Cervicalgia M54.2    DDD (degenerative disc disease), cervical M50.30    Cervical radiculopathy M54.12    MAXIMILIAN on CPAP G47.33, Z99.89    Restless leg syndrome G25.81    Screening for colon cancer Z12.11    Hyperlipidemia E78.5       Past Medical History:   Diagnosis Date    Anemia     Arthritis     Cholecystitis 8/28/2014    Chronic pain     Glaucoma     History of skin cancer     left lower leg    Multiple thyroid nodules     Obstructive sleep apnea     Osteoarthritis of lumbar spine     Osteopenia     Tennis elbow        Past Surgical History:   Procedure Laterality Date    HX APPENDECTOMY  age 9    HX CATARACT REMOVAL Bilateral 12/2019    HX CHOLECYSTECTOMY  2014    HX COLONOSCOPY      HX MAXILLOFACIAL      HX RHINOPLASTY  1990    HX SEPTOPLASTY  02/01/2018    HX VASCULAR ACCESS      port for antibiotics/ \"chronic Lyme disease\"    HX WISDOM TEETH EXTRACTION         Social History     Socioeconomic History    Marital status: SINGLE     Spouse name: Not on file    Number of children: Not on file    Years of education: Not on file    Highest education level: Not on file   Occupational History    Occupation: not currently employed   Tobacco Use    Smoking status: Never Smoker    Smokeless tobacco: Never Used   Substance and Sexual Activity    Alcohol use: No     Alcohol/week: 0.0 standard drinks    Drug use: No     Comment: none    Sexual activity: Not on file   Other Topics Concern    Not on file   Social History Narrative    Has a Master's degree    Lives alone     Social Determinants of Health     Financial Resource Strain: Low Risk     Difficulty of Paying Living Expenses: Not very hard   Food Insecurity: No Food Insecurity    Worried About Running Out of Food in the Last Year: Never true    Ran Out of Food in the Last Year: Never true   Transportation Needs: No Transportation Needs    Lack of Transportation (Medical): No    Lack of Transportation (Non-Medical): No   Physical Activity:     Days of Exercise per Week: Not on file    Minutes of Exercise per Session: Not on file   Stress:     Feeling of Stress : Not on file   Social Connections:     Frequency of Communication with Friends and Family: Not on file    Frequency of Social Gatherings with Friends and Family: Not on file    Attends Denominational Services: Not on file    Active Member of Clubs or Organizations: Not on file    Attends Club or Organization Meetings: Not on file    Marital Status: Not on file   Intimate Partner Violence:     Fear of Current or Ex-Partner: Not on file    Emotionally Abused: Not on file    Physically Abused: Not on file    Sexually Abused: Not on file   Housing Stability:     Unable to Pay for Housing in the Last Year: Not on file    Number of [place] in the Last Year: Not on file    Unstable Housing in the Last Year: Not on file       Allergies   Allergen Reactions    Latex, Natural Rubber Rash    Dexamethasone Other (comments)     Infectious disease MD, Dr. Carlson All states that steroids adversely affect Lyme disease; do not use with anesthesia. Only use during an emergency. Other Medication Itching     IV anitbiotic~can't remember which one~she had to premedicate with Benadryl       Current Outpatient Medications   Medication Sig Dispense Refill    soybean, fermented (Nattokinase) 50 mg cap Take 50 mg by mouth two (2) times a day. nystatin (MYCOSTATIN) 100,000 unit/mL suspension Take  by mouth. OTHER 1 Capsule by Not Applicable route. Levocarnitine HCL      ammonium lactate (LAC-HYDRIN) 12 % lotion       clindamycin (CLEOCIN T) 1 % lotion       montelukast (Singulair) 10 mg tablet Take 10 mg by mouth nightly. fexofenadine (Allegra Allergy) 180 mg tablet Take  by mouth. L-ORNITHINE PO Take 100 g by mouth daily.  L Aspartate  3 full teaspoons daily      cholecalciferol, vitamin D3, (VITAMIN D3 PO) 5,000 Units. OTHER 500 mg. Lactoferrin      magnesium 200 mg tab 200 mg. 3 caps      MANGANESE PO 10 mg daily. ascorbic acid, vitamin C, (Vitamin C) 1,000 mg tablet       calcium carbonate (CALCIUM 300 PO)       Cyanocobalamin-Cobamamide (B12) 5,000-100 mcg lozg       OTHER 1,800 mcg two (2) times a day. Methyl folate      MOLYBDENUM, BULK, 1,500 mcg daily. OTHER 1,100 mg two (2) times a day. Nattokinese      OTHER Phospholipids      OTHER 500 mg. Quercitin      OTHER Zinc/elva 15/1      famotidine (PEPCID) 10 mg tablet Take 20 mg by mouth two (2) times a day. Lactobac 40-Bifido 3-S.thermop (Probiotic) 100 billion cell cap       levocarnitine HCl (ACETYL-L-CARNITINE)       melatonin 1 mg subl       minocycline (Minocin) 100 mg capsule       fish oil-omega-3-DHA-EPA (Fish Oil) 300-1,000 mg capsule       organ concentrates (ADRENAL PO)       prasterone, DHEA, (DHEA PO)       resveratroL 250 mg cap       Saccharomyces boulardii (FLORASTOR) 250 mg capsule       Taurine 500 mg cap       tinidazole (TINDAMAX) 500 mg tablet       CoQ10, Ubiquinol, 200 mg cap       vitamin K2 100 mcg cap       Xylitol, Bulk, powd       tretinoin (RETIN-A) 0.05 % topical cream APPLY THINLY TO FACE EVERY EVENING AS TOLERATED      hydrOXYzine HCL (ATARAX) 50 mg tablet TAKE 1/2 TO 1 TABLET BY MOUTH 30 MINUTES PRIOR TO SLEEP      hydroquinone (ESOTERICA, MELQUIN) 4 % topical cream APPLY TO DARK SPOTS 2 TIMES A DAY FOR 3 MONTHS      estradioL (ESTRACE) 0.01 % (0.1 mg/gram) vaginal cream Apply pea-sized amount (0.5 g) nightly x 2 weeks, then twice weekly      timolol (TIMOPTIC) 0.5 % ophthalmic solution INSTILL 1 DROP IN BOTH EYES DAILY; use day of surgery  2    multivitamin (ONE A DAY) tablet Take 1 Tab by mouth. Take 1 tab twice weekly      mirtazapine (REMERON) 15 mg tablet Take 3.2 mg by mouth nightly. Takes for pain and sleep      rifabutin (MYCOBUTIN) 150 mg capsule Take 1 Capsule by mouth two (2) times a day.  150mg MWF week 1 and MTW week 2      gabapentin (NEURONTIN) 100 mg capsule Take 300 mg by mouth nightly. 6    lorazepam (ATIVAN) 1 mg tablet Take 0.25 mg by mouth nightly. Indications: difficulty sleeping           Review of Systems   Constitutional: Positive for malaise/fatigue. HENT: Negative. Eyes: Negative. Respiratory: Negative. Gastrointestinal: Negative. Genitourinary: dysuria   Musculoskeletal: Positive for joint pain, myalgias and neck pain. Skin: Negative. Neurological: Negative. Endo/Heme/Allergies: Negative. Psychiatric/Behavioral: The patient has insomnia. Objective:    Visit Vitals  Vitals:    01/13/23 1024   BP: 122/65   Pulse: 55   Temp: 97 °F (36.1 °C)   SpO2: 95%         Physical Exam  Constitutional:       Appearance: She is well-developed. HENT:      Head: Normocephalic and atraumatic. Right Ear: External ear normal.      Left Ear: External ear normal.      Nose: Nose normal.   Eyes:      Conjunctiva/sclera: Conjunctivae normal.      Pupils: Pupils are equal, round, and reactive to light. Cardiovascular:      Rate and Rhythm: Normal rate and regular rhythm. Heart sounds: Normal heart sounds. Pulmonary:      Effort: Pulmonary effort is normal.      Breath sounds: Normal breath sounds. Abdominal:      General: Bowel sounds are normal.      Palpations: Abdomen is soft. Musculoskeletal:         General: Normal range of motion. Cervical back: Normal range of motion and neck supple. Skin:     General: Skin is warm and dry. Neurological:      Mental Status: She is alert and oriented to person, place, and time. Deep Tendon Reflexes: Reflexes are normal and symmetric. Psychiatric:         Behavior: Behavior normal.         Thought Content: Thought content normal.         Judgment: Judgment normal.           . RESULTRCNTNC(15W    ASSESSMENT/PLAN:    1.  Urinary tract infection without hematuria, site unspecified  Overview:  Increase fluids to 2 liters daily of mostly water. Monitor temperature. Return if no better in 48 hours. To ER with high fever greater than 103, vomiting, abdominal pain,   diarrhea, dehydration, not voiding greater than 6 hours, severe back or   flank pain. Orders:  -     ciprofloxacin (CIPRO) 500 MG tablet; Take 1 tablet by mouth 2 times daily for 7 days, Disp-14 tablet, R-0Normal  2. Frequent urination  -     AMB POC URINALYSIS DIP STICK AUTO W/O MICRO  3. Vitamin D deficiency, unspecified  Overview:  On daily supplement      Orders:  -     Vitamin D 25 Hydroxy; Future  4. Hyperlipidemia, unspecified hyperlipidemia type  Overview:  dietcontrolled  5. Hx of Lyme disease  Overview:  sees specialist in [facility] twice a year-on pulse abx and several supplements  Pt takes monthly abx for a week and off for a month  6. Osteopenia, unspecified location  Overview:  dexa 9/2021  On vit d supplement     Orders Placed This Encounter   Procedures    Lipid Panel     Standing Status:   Future     Standing Expiration Date:   1/13/2024     Order Specific Question:   Is Patient Fasting?/# of Hours     Answer:   0    Vitamin D 25 Hydroxy     Standing Status:   Future     Standing Expiration Date:   1/13/2024    AMB POC URINALYSIS DIP STICK AUTO W/O MICRO      Orders Placed This Encounter   Medications    ciprofloxacin (CIPRO) 500 MG tablet     Sig: Take 1 tablet by mouth 2 times daily for 7 days     Dispense:  14 tablet     Refill:  0        Results for orders placed or performed in visit on 09/06/21    MAMMOGRAPHY   Result Value Ref Range    Mammography, External         XR Results (maximum last 3): Results from [place] encounter on 12/13/16    XR SPINE LUMB MIN 4 V    Narrative  History: Low back pain without injury. Degenerative disc disease. Pain. CERVICAL SPINE 8 VIEWS: The cervical vertebrae are normal in height and  alignment. Degenerative disc disease is present at C5-C6.  There is no  significant subluxation on flexion or extension views. Oblique images  demonstrate appropriate overlap of the articular facets. There is osseous  narrowing of the left neural foramen at C5-C6. LUMBAR SPINE 5 VIEWS: The lumbar vertebrae are normal in height and alignment. There are no pars defects. Facet degenerative change is present at L5-S1. Degenerative disc disease is present with osteophyte formation anteriorly at  L2-L3 and L3-L4. Impression  IMPRESSION:    1. Degenerative disc disease with left foraminal stenosis at C5-C6. 2. Mild lumbar degenerative disc disease at L2-L3 and L3-L4. XR SPINE CERV W OBL/FLEX/EXT MIN 6 V COMP    Narrative  History: Low back pain without injury. Degenerative disc disease. Pain. CERVICAL SPINE 8 VIEWS: The cervical vertebrae are normal in height and  alignment. Degenerative disc disease is present at C5-C6. There is no  significant subluxation on flexion or extension views. Oblique images  demonstrate appropriate overlap of the articular facets. There is osseous  narrowing of the left neural foramen at C5-C6. LUMBAR SPINE 5 VIEWS: The lumbar vertebrae are normal in height and alignment. There are no pars defects. Facet degenerative change is present at L5-S1. Degenerative disc disease is present with osteophyte formation anteriorly at  L2-L3 and L3-L4. Impression  IMPRESSION:    1. Degenerative disc disease with left foraminal stenosis at C5-C6. 2. Mild lumbar degenerative disc disease at L2-L3 and L3-L4. Results from Office Visit encounter on 09/28/16    XR CHEST PA LAT      CT Results (maximum last 3): Results from [place] encounter on 12/27/17    CT MAXILLOFACIAL WO CONT    Narrative  CT sinuses    INDICATION: Chronic sinusitis, presurgical planning    Multiple high resolution axial images were obtained through the paranasal  sinuses. Coronal reformats were also evaluated. Radiation dose reduction  techniques were used for this study:   All CT scans performed at this facility  use one or all of the following: Automated exposure control, adjustment of the  mA and/or kVp according to patient's size, iterative reconstruction. FINDINGS: The sinuses are clear. No masses or fluid collections are seen. There  is no significant mucoperiosteal thickening. The maxillary ostia are widely  patent. There are no bony lesions. There is no significant septal deviation. Impression  IMPRESSION: Unremarkable CT of the sinuses      Results from Abstract encounter on 11/08/17    CT HEAD W CONT      Results from Abstract encounter on 10/25/17    CT SINUSES WO CONT      MRI Results (maximum last 3): Results from Abstract encounter on 12/23/21    MRI BRAIN W WO CONT      MRI CERV SPINE W WO CONT      Nuclear Medicine Results (maximum last 3): No results found for this or any previous visit. US Results (maximum last 3):      DEXA Results (maximum last 3): LETICIA Results (maximum last 3): Results from Abstract encounter on 11/09/15    LETICIA MAMMOGRAM SCREEN BILAT      IR Results (maximum last 3): No results found for this or any previous visit. VAS/US Results (maximum last 3): No results found for this or any previous visit. PET Results (maximum last 3): No results found for this or any previous visit. We discussed the expected course, resolution and complications of the diagnosis(es) in detail. Medication risks, benefits, costs, interactions, and alternatives were discussed as indicated. I advised her to contact the office if her condition worsens, changes or fails to improve as anticipated. She expressed understanding with the diagnosis(es) and plan. Follow-up and Dispositions    Return in about 6 months + dilia PEREZ in 2/2023         Kaykay Major MD

## 2023-01-16 ENCOUNTER — HOSPITAL ENCOUNTER (OUTPATIENT)
Age: 63
Setting detail: OUTPATIENT SURGERY
Discharge: HOME | End: 2023-01-16
Attending: OTOLARYNGOLOGY | Admitting: OTOLARYNGOLOGY
Payer: MEDICARE

## 2023-01-16 ENCOUNTER — ANESTHESIA (OUTPATIENT)
Dept: SURGERY | Age: 63
End: 2023-01-16
Payer: MEDICARE

## 2023-01-16 ENCOUNTER — ANESTHESIA EVENT (OUTPATIENT)
Dept: SURGERY | Age: 63
End: 2023-01-16
Payer: MEDICARE

## 2023-01-16 VITALS
DIASTOLIC BLOOD PRESSURE: 63 MMHG | HEART RATE: 57 BPM | OXYGEN SATURATION: 100 % | SYSTOLIC BLOOD PRESSURE: 131 MMHG | RESPIRATION RATE: 18 BRPM | TEMPERATURE: 97.6 F | BODY MASS INDEX: 21.19 KG/M2 | HEIGHT: 67 IN | WEIGHT: 135 LBS

## 2023-01-16 PROCEDURE — 6370000000 HC RX 637 (ALT 250 FOR IP): Performed by: OTOLARYNGOLOGY

## 2023-01-16 PROCEDURE — 3600000012 HC SURGERY LEVEL 2 ADDTL 15MIN: Performed by: OTOLARYNGOLOGY

## 2023-01-16 PROCEDURE — 7100000010 HC PHASE II RECOVERY - FIRST 15 MIN: Performed by: OTOLARYNGOLOGY

## 2023-01-16 PROCEDURE — 3600000002 HC SURGERY LEVEL 2 BASE: Performed by: OTOLARYNGOLOGY

## 2023-01-16 PROCEDURE — 2500000003 HC RX 250 WO HCPCS: Performed by: NURSE ANESTHETIST, CERTIFIED REGISTERED

## 2023-01-16 PROCEDURE — 2709999900 HC NON-CHARGEABLE SUPPLY: Performed by: OTOLARYNGOLOGY

## 2023-01-16 PROCEDURE — 6360000002 HC RX W HCPCS: Performed by: NURSE ANESTHETIST, CERTIFIED REGISTERED

## 2023-01-16 PROCEDURE — 3700000000 HC ANESTHESIA ATTENDED CARE: Performed by: OTOLARYNGOLOGY

## 2023-01-16 PROCEDURE — 2720000010 HC SURG SUPPLY STERILE: Performed by: OTOLARYNGOLOGY

## 2023-01-16 PROCEDURE — 6360000002 HC RX W HCPCS: Performed by: ANESTHESIOLOGY

## 2023-01-16 PROCEDURE — 7100000011 HC PHASE II RECOVERY - ADDTL 15 MIN: Performed by: OTOLARYNGOLOGY

## 2023-01-16 PROCEDURE — 3700000001 HC ADD 15 MINUTES (ANESTHESIA): Performed by: OTOLARYNGOLOGY

## 2023-01-16 PROCEDURE — 2500000003 HC RX 250 WO HCPCS: Performed by: OTOLARYNGOLOGY

## 2023-01-16 PROCEDURE — 7100000000 HC PACU RECOVERY - FIRST 15 MIN: Performed by: OTOLARYNGOLOGY

## 2023-01-16 PROCEDURE — 7100000001 HC PACU RECOVERY - ADDTL 15 MIN: Performed by: OTOLARYNGOLOGY

## 2023-01-16 PROCEDURE — 6370000000 HC RX 637 (ALT 250 FOR IP): Performed by: ANESTHESIOLOGY

## 2023-01-16 PROCEDURE — 2580000003 HC RX 258: Performed by: ANESTHESIOLOGY

## 2023-01-16 RX ORDER — OXYCODONE HYDROCHLORIDE 5 MG/1
5 TABLET ORAL
Status: DISCONTINUED | OUTPATIENT
Start: 2023-01-16 | End: 2023-01-16 | Stop reason: HOSPADM

## 2023-01-16 RX ORDER — PROPOFOL 10 MG/ML
INJECTION, EMULSION INTRAVENOUS PRN
Status: DISCONTINUED | OUTPATIENT
Start: 2023-01-16 | End: 2023-01-16 | Stop reason: SDUPTHER

## 2023-01-16 RX ORDER — ROCURONIUM BROMIDE 10 MG/ML
INJECTION, SOLUTION INTRAVENOUS PRN
Status: DISCONTINUED | OUTPATIENT
Start: 2023-01-16 | End: 2023-01-16

## 2023-01-16 RX ORDER — KETOROLAC TROMETHAMINE 30 MG/ML
INJECTION, SOLUTION INTRAMUSCULAR; INTRAVENOUS PRN
Status: DISCONTINUED | OUTPATIENT
Start: 2023-01-16 | End: 2023-01-16 | Stop reason: SDUPTHER

## 2023-01-16 RX ORDER — EPHEDRINE SULFATE/0.9% NACL/PF 50 MG/5 ML
SYRINGE (ML) INTRAVENOUS PRN
Status: DISCONTINUED | OUTPATIENT
Start: 2023-01-16 | End: 2023-01-16 | Stop reason: SDUPTHER

## 2023-01-16 RX ORDER — LIDOCAINE HYDROCHLORIDE 20 MG/ML
INJECTION, SOLUTION EPIDURAL; INFILTRATION; INTRACAUDAL; PERINEURAL PRN
Status: DISCONTINUED | OUTPATIENT
Start: 2023-01-16 | End: 2023-01-16 | Stop reason: SDUPTHER

## 2023-01-16 RX ORDER — IPRATROPIUM BROMIDE AND ALBUTEROL SULFATE 2.5; .5 MG/3ML; MG/3ML
1 SOLUTION RESPIRATORY (INHALATION)
Status: DISCONTINUED | OUTPATIENT
Start: 2023-01-16 | End: 2023-01-16 | Stop reason: HOSPADM

## 2023-01-16 RX ORDER — HYDROMORPHONE HCL 110MG/55ML
0.5 PATIENT CONTROLLED ANALGESIA SYRINGE INTRAVENOUS EVERY 5 MIN PRN
Status: DISCONTINUED | OUTPATIENT
Start: 2023-01-16 | End: 2023-01-16 | Stop reason: HOSPADM

## 2023-01-16 RX ORDER — LIDOCAINE HYDROCHLORIDE AND EPINEPHRINE 10; 10 MG/ML; UG/ML
INJECTION, SOLUTION INFILTRATION; PERINEURAL PRN
Status: DISCONTINUED | OUTPATIENT
Start: 2023-01-16 | End: 2023-01-16 | Stop reason: HOSPADM

## 2023-01-16 RX ORDER — PROCHLORPERAZINE EDISYLATE 5 MG/ML
5 INJECTION INTRAMUSCULAR; INTRAVENOUS
Status: COMPLETED | OUTPATIENT
Start: 2023-01-16 | End: 2023-01-16

## 2023-01-16 RX ORDER — SODIUM CHLORIDE 0.9 % (FLUSH) 0.9 %
5-40 SYRINGE (ML) INJECTION PRN
Status: DISCONTINUED | OUTPATIENT
Start: 2023-01-16 | End: 2023-01-16 | Stop reason: HOSPADM

## 2023-01-16 RX ORDER — LIDOCAINE HYDROCHLORIDE 10 MG/ML
1 INJECTION, SOLUTION INFILTRATION; PERINEURAL
Status: DISCONTINUED | OUTPATIENT
Start: 2023-01-16 | End: 2023-01-16 | Stop reason: HOSPADM

## 2023-01-16 RX ORDER — HALOPERIDOL 5 MG/ML
1 INJECTION INTRAMUSCULAR
Status: DISCONTINUED | OUTPATIENT
Start: 2023-01-16 | End: 2023-01-16 | Stop reason: HOSPADM

## 2023-01-16 RX ORDER — GLYCOPYRROLATE 0.2 MG/ML
INJECTION INTRAMUSCULAR; INTRAVENOUS PRN
Status: DISCONTINUED | OUTPATIENT
Start: 2023-01-16 | End: 2023-01-16 | Stop reason: SDUPTHER

## 2023-01-16 RX ORDER — MIDAZOLAM HYDROCHLORIDE 2 MG/2ML
2 INJECTION, SOLUTION INTRAMUSCULAR; INTRAVENOUS
Status: DISCONTINUED | OUTPATIENT
Start: 2023-01-16 | End: 2023-01-16 | Stop reason: HOSPADM

## 2023-01-16 RX ORDER — SODIUM CHLORIDE 0.9 % (FLUSH) 0.9 %
5-40 SYRINGE (ML) INJECTION EVERY 12 HOURS SCHEDULED
Status: DISCONTINUED | OUTPATIENT
Start: 2023-01-16 | End: 2023-01-16 | Stop reason: HOSPADM

## 2023-01-16 RX ORDER — SODIUM CHLORIDE 9 MG/ML
INJECTION, SOLUTION INTRAVENOUS PRN
Status: DISCONTINUED | OUTPATIENT
Start: 2023-01-16 | End: 2023-01-16 | Stop reason: HOSPADM

## 2023-01-16 RX ORDER — ACETAMINOPHEN 500 MG
1000 TABLET ORAL ONCE
Status: COMPLETED | OUTPATIENT
Start: 2023-01-16 | End: 2023-01-16

## 2023-01-16 RX ORDER — ONDANSETRON 2 MG/ML
INJECTION INTRAMUSCULAR; INTRAVENOUS PRN
Status: DISCONTINUED | OUTPATIENT
Start: 2023-01-16 | End: 2023-01-16 | Stop reason: SDUPTHER

## 2023-01-16 RX ORDER — SODIUM CHLORIDE, SODIUM LACTATE, POTASSIUM CHLORIDE, CALCIUM CHLORIDE 600; 310; 30; 20 MG/100ML; MG/100ML; MG/100ML; MG/100ML
INJECTION, SOLUTION INTRAVENOUS CONTINUOUS
Status: DISCONTINUED | OUTPATIENT
Start: 2023-01-16 | End: 2023-01-16 | Stop reason: HOSPADM

## 2023-01-16 RX ORDER — SUCCINYLCHOLINE CHLORIDE 20 MG/ML
INJECTION INTRAMUSCULAR; INTRAVENOUS PRN
Status: DISCONTINUED | OUTPATIENT
Start: 2023-01-16 | End: 2023-01-16 | Stop reason: SDUPTHER

## 2023-01-16 RX ORDER — APREPITANT 40 MG/1
40 CAPSULE ORAL
Status: DISCONTINUED | OUTPATIENT
Start: 2023-01-16 | End: 2023-01-16 | Stop reason: HOSPADM

## 2023-01-16 RX ORDER — FENTANYL CITRATE 50 UG/ML
100 INJECTION, SOLUTION INTRAMUSCULAR; INTRAVENOUS
Status: DISCONTINUED | OUTPATIENT
Start: 2023-01-16 | End: 2023-01-16 | Stop reason: HOSPADM

## 2023-01-16 RX ORDER — OXYMETAZOLINE HYDROCHLORIDE 0.05 G/100ML
SPRAY NASAL PRN
Status: DISCONTINUED | OUTPATIENT
Start: 2023-01-16 | End: 2023-01-16 | Stop reason: HOSPADM

## 2023-01-16 RX ADMIN — PROCHLORPERAZINE EDISYLATE 5 MG: 5 INJECTION INTRAMUSCULAR; INTRAVENOUS at 11:07

## 2023-01-16 RX ADMIN — ONDANSETRON 4 MG: 2 INJECTION INTRAMUSCULAR; INTRAVENOUS at 09:59

## 2023-01-16 RX ADMIN — Medication 140 MG: at 09:54

## 2023-01-16 RX ADMIN — ACETAMINOPHEN 1000 MG: 500 TABLET, FILM COATED ORAL at 08:49

## 2023-01-16 RX ADMIN — Medication 10 MG: at 10:03

## 2023-01-16 RX ADMIN — FENTANYL CITRATE 100 MCG: 50 INJECTION INTRAMUSCULAR; INTRAVENOUS at 09:50

## 2023-01-16 RX ADMIN — Medication 10 MG: at 10:01

## 2023-01-16 RX ADMIN — KETOROLAC TROMETHAMINE 30 MG: 30 INJECTION, SOLUTION INTRAMUSCULAR; INTRAVENOUS at 10:10

## 2023-01-16 RX ADMIN — LIDOCAINE HYDROCHLORIDE 100 MG: 20 INJECTION, SOLUTION EPIDURAL; INFILTRATION; INTRACAUDAL; PERINEURAL at 09:54

## 2023-01-16 RX ADMIN — PROPOFOL 150 MG: 10 INJECTION, EMULSION INTRAVENOUS at 09:54

## 2023-01-16 RX ADMIN — GLYCOPYRROLATE 0.2 MG: 0.2 INJECTION, SOLUTION INTRAMUSCULAR; INTRAVENOUS at 10:05

## 2023-01-16 RX ADMIN — SODIUM CHLORIDE, POTASSIUM CHLORIDE, SODIUM LACTATE AND CALCIUM CHLORIDE: 600; 310; 30; 20 INJECTION, SOLUTION INTRAVENOUS at 08:49

## 2023-01-16 RX ADMIN — Medication 10 MG: at 10:05

## 2023-01-16 ASSESSMENT — PAIN - FUNCTIONAL ASSESSMENT: PAIN_FUNCTIONAL_ASSESSMENT: 0-10

## 2023-01-16 NOTE — OP NOTE
[facility]  OPERATIVE REPORT    Name:  Eleno Pool  MR#:  [id number]  :  1960  ACCOUNT #:  [de-identified]  DATE OF SERVICE:  2023    PREOPERATIVE DIAGNOSES:  Inferior turbinate hypertrophy, nasal obstruction, obstructive sleep apnea, acquired deformity of the nose. POSTOPERATIVE DIAGNOSES:  Inferior turbinate hypertrophy, nasal obstruction, obstructive sleep apnea, acquired deformity of the nose. PROCEDURE PERFORMED:  Bilateral submucosal resection of the inferior turbinates and bilateral radiofrequency ablation of septal swells and internal nasal valve areas bilaterally. SURGEON:  Abran Short. DO. Graciela    ASSISTANT:  None. ANESTHESIA:  General.    COMPLICATIONS:  None. SPECIMENS REMOVED:  None. IMPLANTS:  None. ESTIMATED BLOOD LOSS:  20 mL. HISTORY:  This is a 60-year-old female, who came to see us in the office because of obstructive sleep apnea. She was diagnosed with mouth sleep apnea in , and she was put on a CPAP. She had a terrible time with the CPAP. She was not tolerating it at all. So, she has looked for other ways to help her sleep apnea without using the CPAP. She has visited her dentist and she wants to do a Vivos appliance and also told her to consider a frenectomy. She is also having an issue the runny nose, maxillary pressure and pain. She feels like her throat is collapsing at times. She has a history of recurrent infections and chronic allergies. The infection has cleared up after having sinus surgery. She also has a history of Lyme disease and has ear issues and neck pain, especially on the right. So, physical exam reveals a straight nasal septum. She has severe inferior turbinate hypertrophy bilaterally with approximately 90% blockage bilaterally due to nothing, but the nasal turbinates.   She also has bilateral internal nasal valve collapse and she also has bilateral septal swells, so she has positive Martita and positive modified Martita test bilaterally.  Since she has failed medical therapy, it was my recommendation she undergo a turbinate reduction and radiofrequency ablation of the septal swells and internal nasal valve.  Procedure, risks and benefits were discussed with her in the office.  All questions were answered, and she is agreeable to the surgery itself.    PROCEDURE:  The patient was identified in the preoperative waiting area, taken back to the operating room where she underwent general anesthesia.  Looking up the nose again, could see the 90% obstruction bilaterally.  I did use 1% lidocaine with epinephrine and injected a total of 2 mL into the inferior turbinates.  I then also injected the internal nasal valve and septal swells with another 2 mL of 1% lidocaine with epinephrine, and that was done bilaterally.  Afrin-soaked pledgets were placed in the inside of the nose, left there for a couple of minutes, these were then removed.  Turbinate reduction was done first.  A knife was used to make small stab incision in the anterior portion of the inferior turbinates.  A Hyden was used to create a small pocket between the bone, the inferior turbinate and mucosa medially and a microdebrider with a turbinate blade was used to reduce the prominent bone and tissue of the inferior turbinates bilaterally.  Boies elevator was used to medialize and lateralize the inferior turbinates and this gave the patient a widely patent airway.  She had a least 1 cm space between the septum and the inferior turbinate now, anterior to posterior.  I then took the VivAer probe and I was able to treat three areas on the nasal valve bilaterally for 30 seconds each; three areas on the left and two areas on the right and then in the junction between the internal nasal valve and head of the inferior turbinate for 15 seconds each.  I also did one area per side on the septal swells anteriorly.  In doing all that, this gave the patient a widely patent  internal and external nasal valve area to go along the more open nasal cavity. Some blood was suctioned from the nose and nasopharynx and the patient was awakened and taken to the postop recovery room in a stable condition.         Gena Cabello DO      [id number]  D:  01/16/2023 10:30  T:  01/16/2023 14:08  JOB #:  [id number]

## 2023-01-16 NOTE — ANESTHESIA PRE PROCEDURE
Department of Anesthesiology  Preprocedure Note       Name:  Emanuel Morris   Age:  60 y.o.  :  1960                                          MRN:  [id number]         Date:  2023      Surgeon: Marika Mcclain):  Susana Lefort, DO    Procedure: Procedure(s):  BILATERAL SMRITS  BILATERAL RADIO FREQUENCY REDUCTION OF NASAL SWELL BODIES, RADIOFREQUNECY REPAIR OF NASAL VALVE  CALLAPSE    Medications prior to admission:   Prior to Admission medications    Medication Sig Start Date End Date Taking?  Authorizing Provider   famotidine (PEPCID) 20 MG tablet Take 20 mg by mouth 2 times daily   Yes Historical Provider, MD   ivermectin 3 MG tablet Take by mouth once a week 13 mg on the day prior to surgery- Pt states per her MD as \"covid preventative\"   Yes Historical Provider, MD   Ciprofloxacin (CIPRO PO) Take by mouth daily    Historical Provider, MD   ciprofloxacin (CIPRO) 500 MG tablet Take 1 tablet by mouth 2 times daily for 7 days 23  Mk Will MD   Probiotic Product (PROBIOTIC-10 PO) Take 1 tablet by mouth daily 13   Historical Provider, MD   magnesium 200 MG TABS tablet Take 600 mg by mouth in the morning and at bedtime 20   Historical Provider, MD   Omega-3 Fatty Acids (FISH OIL) 1000 MG CAPS Take 1 caplet by mouth daily 13   Historical Provider, MD   cyanocobalamin 1000 MCG tablet Take 1,000 mcg by mouth daily 13   Historical Provider, MD   vitamin D-3 (CHOLECALCIFEROL) 125 MCG (5000 UT) TABS Take 5,000 Units by mouth daily 21   Historical Provider, MD   CALCIUM PO Take 300 mg by mouth daily 13   Historical Provider, MD   Levocarnitine 500 MG TABS Take 1 caplet by mouth daily    Historical Provider, MD   MOLYBDENUM PO Take 1,500 mcg by mouth daily 13   Historical Provider, MD   Misc Natural Products (ADRENAL PO) daily 20   Historical Provider, MD   Prasterone, DHEA, 10 MG CAPS Take 20 mg by mouth daily    Historical Provider, MD   Xylitol POWD by Other route    Historical Provider, MD [facility] Lactoferrin-every 5 weeks    Historical Provider, MD   Ascorbic Acid (VITAMIN C) 1000 MG tablet Take 1,000 mg by mouth daily    Historical ProviderMD M[facility]ORD Zinc/elva 15/1    Historical Provider, MD [facility] Take 500 mg by mouth daily Quercitin    Historical Provider, MD [facility] Phospholipids    Historical Provider, MD [facility] Take 50 mg by mouth in the morning and at bedtime Nattokinase    Historical Provider, MD   Levomefolate Glucosamine (METHYLFOLATE PO) Take 1,800 mcg by mouth every morning    Historical Provider, MD   ammonium lactate (LAC-HYDRIN) 12 % lotion  10/27/21   Historical Provider, MD   clindamycin (CLEOCIN T) 1 % lotion Apply topically as needed 10/27/21   Historical Provider, MD   saccharomyces boulardii (FLORASTOR) 250 MG capsule Take 250 mg by mouth daily    Historical Provider, MD   Taurine 500 MG CAPS Take 500 mg by mouth daily    Historical Provider, MD   tinidazole (TINDAMAX) 500 MG tablet Take 500 mg by mouth daily (with breakfast)    Historical Provider, MD   Coenzyme Q10 (COQ10) 200 MG CAPS Take 1 tablet by mouth daily    Historical Provider, MD   Menaquinone-7 (VITAMIN K2 PO) Take 100 mcg by mouth    Historical Provider, MD   NONFORMULARY Take 250 mg by mouth daily Resveratrol    Historical Provider, MD   minocycline (MINOCIN;DYNACIN) 100 MG capsule as needed 1/1/20   Historical Provider, MD   valACYclovir (VALTREX) 1 g tablet Take 500 mg by mouth daily 8/1/22   Historical Provider, MD   L-ORNITHINE PO Take by mouth at bedtime 100 grams  Aspartate 3 full teaspoons daily    Ar Automatic Reconciliation   MANGANESE PO Take 10 mg by mouth daily 7/9/21   Ar Automatic Reconciliation   estradiol (ESTRACE) 0.1 MG/GM vaginal cream Apply pea-sized amount (0.5 g) nightly x 2 weeks, then twice weekly 9/1/20   Ar Automatic Reconciliation   fexofenadine (ALLEGRA) 180 MG tablet Take 180 mg by mouth daily    Ar Automatic Reconciliation   gabapentin (NEURONTIN) 100 MG capsule Take 300 mg by mouth at bedtime. 10/5/15   Ar Automatic Reconciliation   hydroquinone 4 % cream Apply 1 Units topically as needed APPLY TO DARK SPOTS 11/9/20   Ar Automatic Reconciliation   hydrOXYzine (ATARAX) 50 MG tablet TAKE 1/2 TO 1 TABLET BY MOUTH 30 MINUTES PRIOR TO SLEEP 12/3/20   Ar Automatic Reconciliation   LORazepam (ATIVAN) 1 MG tablet Take 0.25 mg by mouth as needed.    Ar Automatic Reconciliation   mirtazapine (REMERON) 15 MG tablet Take 7.5 mg by mouth nightly    Ar Automatic Reconciliation   montelukast (SINGULAIR) 10 MG tablet Take 10 mg by mouth nightly    Ar Automatic Reconciliation   nystatin (MYCOSTATIN) 505399 UNIT/ML suspension Take by mouth as needed Every five weeks    Ar Automatic Reconciliation   rifabutin (MYCOBUTIN) 150 MG capsule Take 1 capsule by mouth 2 times daily Every 5 weeks 8/12/15   Ar Automatic Reconciliation   timolol (TIMOPTIC) 0.5 % ophthalmic solution Place 1 drop into both eyes daily INSTILL 1 DROP IN BOTH EYES DAILY; use day of surgery 8/29/17   Ar Automatic Reconciliation   tretinoin (RETIN-A) 0.05 % cream APPLY THINLY TO FACE EVERY EVENING AS TOLERATED 10/30/20   Ar Automatic Reconciliation       Current medications:    Current Facility-Administered Medications   Medication Dose Route Frequency Provider Last Rate Last Admin   • lidocaine 1 % injection 1 mL  1 mL IntraDERmal Once PRN Michoacano Pisano MD       • fentaNYL (SUBLIMAZE) injection 100 mcg  100 mcg IntraVENous Once PRN Michoacano Pisano MD       • lactated ringers infusion   IntraVENous Continuous Michoacano Pisano  mL/hr at 01/16/23 0905 NoRateChange at 01/16/23 0905   • sodium chloride flush 0.9 % injection 5-40 mL  5-40 mL IntraVENous 2 times per day Michoacano Pisano MD       • sodium chloride flush 0.9 % injection 5-40 mL  5-40 mL IntraVENous PRN Michoacano Pisano MD       • 0.9 % sodium chloride infusion   IntraVENous PRN Michoacano Pisano MD       •  midazolam PF (VERSED) injection 2 mg  2 mg IntraVENous Once PRN Abimael Nguyen MD        aprepitant (EMEND) capsule 40 mg  40 mg Oral NOW Abimael Nguyen MD           Allergies: Allergies   Allergen Reactions    Latex Rash    Dexamethasone Other (See Comments)     Infectious disease MD, Dr. Jayson Mcclain states that steroids adversely affect Lyme disease; do not use with anesthesia. Only use during an emergency.        Problem List:    Patient Active Problem List   Diagnosis Code    Obstructive sleep apnea (adult) (pediatric) G47.33    Vitamin D deficiency, unspecified E55.9    Restless legs G25.81    Glaucoma H40.9    DDD (degenerative disc disease), cervical M50.30    Restless leg syndrome G25.81    Cervical radiculopathy M54.12    Lyme disease A69.20    Osteopenia determined by x-ray M85.80    PLMD (periodic limb movement disorder) G47.61    Multiple thyroid nodules E04.2    Bradycardia, unspecified R00.1    Burning with urination R30.0    Sinusitis J32.9    S/P sinus surgery Z98.890    Primary insomnia F51.01    Tachycardia R00.0    Medicare annual wellness visit, subsequent Z00.00    Left tennis elbow M77.12    Urinary tract infection without hematuria N39.0    Hypoxemia R09.02    Chronic fatigue R53.82    Suspected sleep apnea R29.818    Hyperlipidemia E78.5    Sleep-related hypoxia G47.34    Cervicalgia M54.2    Osteoarthritis of lumbar spine M47.816    Osteopenia M85.80    Myalgia M79.10    Other allergic rhinitis J30.89    Recurrent sinus infections J32.9    Hx of sinus surgery Z98.890    Pharyngitis J02.9    Daytime sleepiness R40.0    Palpitations R00.2    Hx of Lyme disease Z86.19    Hip sprain, left, sequela S73.102S       Past Medical History:        Diagnosis Date    Anemia     denies hx of blood transfusions    Anesthesia complication     STEROIDS GIVEN DURING SURGERY IS CONTRAINDICATED WITH LYME DISEASE    Arthritis     Cholecystitis 8/28/2014    Chronic pain  Glaucoma     History of skin cancer     left lower leg    Lyme disease 2010    TREATED EVERY 5 WEEKS    Multiple thyroid nodules     Obstructive sleep apnea     no cpap, can not tolerate    Osteoarthritis of lumbar spine     Osteopenia     Tennis elbow        Past Surgical History:        Procedure Laterality Date    APPENDECTOMY  age 9   [de-identified] CATARACT REMOVAL Bilateral 12/2019    CHOLECYSTECTOMY  2014    COLONOSCOPY      MAXILLOFACIAL      RHINOPLASTY  1990    SEPTOPLASTY  02/01/2018    WISDOM TOOTH EXTRACTION         Social History:    Social History     Tobacco Use    Smoking status: Never    Smokeless tobacco: Never   Substance Use Topics    Alcohol use: No     Alcohol/week: 0.0 standard drinks                                Counseling given: Not Answered      Vital Signs (Current):   Vitals:    01/12/23 1040 01/16/23 0819   BP:  (!) 124/57   Pulse:  55   Resp:  18   Temp:  97.9 °F (36.6 °C)   TempSrc:  Oral   SpO2:  97%   Weight: 135 lb (61.2 kg) 135 lb (61.2 kg)   Height: 5' 6.5\" (1.689 m)                                               BP Readings from Last 3 Encounters:   01/16/23 (!) 124/57   01/13/23 122/65   08/19/22 (!) 119/53       NPO Status: Time of last liquid consumption: 2200                        Time of last solid consumption: 2200                        Date of last liquid consumption: 01/15/23                        Date of last solid food consumption: 01/15/23    BMI:   Wt Readings from Last 3 Encounters:   01/16/23 135 lb (61.2 kg)   01/13/23 135 lb (61.2 kg)   08/19/22 133 lb (60.3 kg)     Body mass index is 21.46 kg/m². CBC: No results found for: WBC, RBC, HGB, HCT, MCV, RDW, PLT    CMP: No results found for: NA, K, CL, CO2, BUN, CREATININE, GFRAA, AGRATIO, LABGLOM, GLUCOSE, GLU, PROT, CALCIUM, BILITOT, ALKPHOS, AST, ALT    POC Tests: No results for input(s): POCGLU, POCNA, POCK, POCCL, POCBUN, POCHEMO, POCHCT in the last 72 hours.     Coags: No results found for: PROTIME, INR, APTT    HCG (If Applicable): No results found for: PREGTESTUR, PREGSERUM, HCG, HCGQUANT     ABGs: No results found for: PHART, PO2ART, BIV7RUP, VYI4WZA, BEART, L4QEJGTG     Type & Screen (If Applicable):  No results found for: LABABO, LABRH    Drug/Infectious Status (If Applicable):  No results found for: HIV, HEPCAB    COVID-19 Screening (If Applicable): No results found for: COVID19        Anesthesia Evaluation  Patient summary reviewed and Nursing notes reviewed   history of anesthetic complications (easy mask vent; grade 3-4 DL in past; success with glidescope): history of difficult intubation. Airway: Mallampati: III          Dental: normal exam         Pulmonary:normal exam    (+) sleep apnea: on noncompliant,                             Cardiovascular:  Exercise tolerance: good (>4 METS),                     Neuro/Psych:   (+) neuromuscular disease (chronic lyme disease):,             GI/Hepatic/Renal: Neg GI/Hepatic/Renal ROS            Endo/Other: Negative Endo/Other ROS                    Abdominal:             Vascular: negative vascular ROS. Other Findings:           Anesthesia Plan      general     ASA 2       Induction: intravenous. Anesthetic plan and risks discussed with patient.                         Navin Rojas MD   1/16/2023

## 2023-01-16 NOTE — ANESTHESIA PROCEDURE NOTES
Airway  Date/Time: 1/16/2023 10:00 AM  Urgency: elective    Airway not difficult    General Information and Staff    Patient location during procedure: OR  Performed: resident/CRNA     Indications and Patient Condition  Indications for airway management: anesthesia  Spontaneous Ventilation: absent  Sedation level: deep  Preoxygenated: yes  Patient position: sniffing  MILS not maintained throughout  Mask difficulty assessment: vent by bag mask    Final Airway Details  Final airway type: endotracheal airway      Successful airway: ETT  Cuffed: yes   Successful intubation technique: video laryngoscopy  Facilitating devices/methods: intubating stylet  Endotracheal tube insertion site: oral  Blade size: #3  ETT size (mm): 6.5  Cormack-Lehane Classification: grade I - full view of glottis  Placement verified by: chest auscultation and capnometry   Measured from: lips  ETT to lips (cm): 23  Number of attempts at approach: 1  Ventilation between attempts: bag mask

## 2023-01-16 NOTE — DISCHARGE INSTRUCTIONS
Things to Remember After Sinus Surgery    1. Sleep and rest with head elevated on several pillows to decrease swelling and pressure. 2. You will have a 2 X 2 gauze under your nose to absorb the bloody discharge you will have the first 2 to 3 days after surgery. If you saturate the gauze more than 3 times in 30 minutes, please notify the office. 3. Numbness to the front teeth after nasal surgery is normal. The feeling usually returns in 6 to 8 weeks. 4. Clean the end of your nose with hydrogen peroxide. Do not try to clean inside. It may cause bleeding. 5. Do not blow your nose until you have had your first post-op appointment ( at least one week after surgery). 6. For a mild pain and a low-grade temperature, you may take Tylenol. No aspirin, Motrin, Advil, or Aleve for at least 3 weeks after nasal surgery. 7. If the nasal surgery requires an exterior cast and the cast falls off, please notify the office during office hours. 8. Nasal congestion after surgery is normal and will resolve after the splints and/or packing are removed. ACTIVITY   Bathe or shower as directed by your doctor. Avoid strenuous work and becoming overheated. Activity as directed by your doctor   Avoid bending over. DIET   Clear, cool liquids the first day; then soft diet the second day   Advance to regular diet on third day, unless your doctor orders otherwise. No milk products or foods with red color dyes   If nausea and vomiting continues, call your doctor. PAIN   Take pain medication as directed by your doctor. Call your doctor if pain is NOT relieved by medication. DO NOT take aspirin or blood thinners until directed by your doctor. FOLLOW-UP PHONE [facility] will be made by nursing staff   If you have any problems, call your doctor as needed. CALL YOUR DOCTOR IF   Bleeding is expected the first few days and should gradually clear.    Temperature of 10 1°F or above   Green or yellow discharge from nose   Stiff neck, changes in vision or mental status, confusion, or excessive drowsiness    After general anesthesia or intravenous sedation, for 24 hours or while taking prescription Narcotics:  Limit your activities  A responsible adult needs to be with you for the next 24 hours  Do not drive and operate hazardous machinery  Do not make important personal or business decisions  Do not drink alcoholic beverages  If you have not urinated within 8 hours after discharge, please contact your surgeon on call. If you have sleep apnea and have a CPAP machine, please use it for all naps and sleeping. Please use caution when taking narcotics and any of your home medications that may cause drowsiness. *  Please give a list of your current medications to your Primary Care Provider. *  Please update this list whenever your medications are discontinued, doses are      changed, or new medications (including over-the-counter products) are added. *  Please carry medication information at all times in case of emergency situations. These are general instructions for a healthy lifestyle:  No smoking/ No tobacco products/ Avoid exposure to second hand smoke  Surgeon General's Warning:  Quitting smoking now greatly reduces serious risk to your health. Obesity, smoking, and sedentary lifestyle greatly increases your risk for illness  A healthy diet, regular physical exercise & weight monitoring are important for maintaining a healthy lifestyle    You may be retaining fluid if you have a history of heart failure or if you experience any of the following symptoms:  Weight gain of 3 pounds or more overnight or 5 pounds in a week, increased swelling in our hands or feet or shortness of breath while lying flat in bed. Please call your doctor as soon as you notice any of these symptoms; do not wait until your next office visit.

## 2023-02-22 SDOH — ECONOMIC STABILITY: FOOD INSECURITY: WITHIN THE PAST 12 MONTHS, THE FOOD YOU BOUGHT JUST DIDN'T LAST AND YOU DIDN'T HAVE MONEY TO GET MORE.: NEVER TRUE

## 2023-02-22 SDOH — ECONOMIC STABILITY: INCOME INSECURITY: HOW HARD IS IT FOR YOU TO PAY FOR THE VERY BASICS LIKE FOOD, HOUSING, MEDICAL CARE, AND HEATING?: NOT VERY HARD

## 2023-02-22 SDOH — ECONOMIC STABILITY: FOOD INSECURITY: WITHIN THE PAST 12 MONTHS, YOU WORRIED THAT YOUR FOOD WOULD RUN OUT BEFORE YOU GOT MONEY TO BUY MORE.: NEVER TRUE

## 2023-02-22 SDOH — ECONOMIC STABILITY: HOUSING INSECURITY
IN THE LAST 12 MONTHS, WAS THERE A TIME WHEN YOU DID NOT HAVE A STEADY PLACE TO SLEEP OR SLEPT IN A SHELTER (INCLUDING NOW)?: NO

## 2023-02-23 ENCOUNTER — OFFICE VISIT (OUTPATIENT)
Dept: PRIMARY CARE CLINIC | Facility: CLINIC | Age: 63
End: 2023-02-23
Payer: MEDICARE

## 2023-02-23 VITALS
OXYGEN SATURATION: 98 % | TEMPERATURE: 97.5 F | WEIGHT: 134 LBS | DIASTOLIC BLOOD PRESSURE: 68 MMHG | HEIGHT: 67 IN | HEART RATE: 57 BPM | SYSTOLIC BLOOD PRESSURE: 127 MMHG | BODY MASS INDEX: 21.03 KG/M2

## 2023-02-23 DIAGNOSIS — Z86.19 HX OF LYME DISEASE: ICD-10-CM

## 2023-02-23 DIAGNOSIS — R53.82 CHRONIC FATIGUE, UNSPECIFIED: ICD-10-CM

## 2023-02-23 DIAGNOSIS — M85.80 OSTEOPENIA, UNSPECIFIED LOCATION: ICD-10-CM

## 2023-02-23 DIAGNOSIS — E78.5 HYPERLIPIDEMIA, UNSPECIFIED HYPERLIPIDEMIA TYPE: ICD-10-CM

## 2023-02-23 DIAGNOSIS — J30.89 OTHER ALLERGIC RHINITIS: ICD-10-CM

## 2023-02-23 DIAGNOSIS — H40.9 GLAUCOMA OF BOTH EYES, UNSPECIFIED GLAUCOMA TYPE: ICD-10-CM

## 2023-02-23 DIAGNOSIS — Z00.00 MEDICARE ANNUAL WELLNESS VISIT, SUBSEQUENT: Primary | ICD-10-CM

## 2023-02-23 DIAGNOSIS — E55.9 VITAMIN D DEFICIENCY, UNSPECIFIED: ICD-10-CM

## 2023-02-23 PROCEDURE — G0439 PPPS, SUBSEQ VISIT: HCPCS | Performed by: FAMILY MEDICINE

## 2023-02-23 PROCEDURE — 99214 OFFICE O/P EST MOD 30 MIN: CPT | Performed by: FAMILY MEDICINE

## 2023-02-23 SDOH — ECONOMIC STABILITY: HOUSING INSECURITY: IN THE LAST 12 MONTHS, HOW MANY PLACES HAVE YOU LIVED?: 1

## 2023-02-23 SDOH — ECONOMIC STABILITY: INCOME INSECURITY: IN THE LAST 12 MONTHS, WAS THERE A TIME WHEN YOU WERE NOT ABLE TO PAY THE MORTGAGE OR RENT ON TIME?: NO

## 2023-02-23 ASSESSMENT — PATIENT HEALTH QUESTIONNAIRE - PHQ9
SUM OF ALL RESPONSES TO PHQ QUESTIONS 1-9: 0
SUM OF ALL RESPONSES TO PHQ QUESTIONS 1-9: 0
1. LITTLE INTEREST OR PLEASURE IN DOING THINGS: 0
SUM OF ALL RESPONSES TO PHQ9 QUESTIONS 1 & 2: 0
SUM OF ALL RESPONSES TO PHQ QUESTIONS 1-9: 0
2. FEELING DOWN, DEPRESSED OR HOPELESS: 0
SUM OF ALL RESPONSES TO PHQ QUESTIONS 1-9: 0

## 2023-02-23 ASSESSMENT — SOCIAL DETERMINANTS OF HEALTH (SDOH)
IN A TYPICAL WEEK, HOW MANY TIMES DO YOU TALK ON THE PHONE WITH FAMILY, FRIENDS, OR NEIGHBORS?: TWICE A WEEK
DO YOU BELONG TO ANY CLUBS OR ORGANIZATIONS SUCH AS CHURCH GROUPS UNIONS, FRATERNAL OR ATHLETIC GROUPS, OR SCHOOL GROUPS?: YES
WITHIN THE LAST YEAR, HAVE YOU BEEN AFRAID OF YOUR PARTNER OR EX-PARTNER?: NO
HOW OFTEN DO YOU ATTENT MEETINGS OF THE CLUB OR ORGANIZATION YOU BELONG TO?: NEVER
WITHIN THE LAST YEAR, HAVE YOU BEEN HUMILIATED OR EMOTIONALLY ABUSED IN OTHER WAYS BY YOUR PARTNER OR EX-PARTNER?: NO
DO YOU BELONG TO ANY CLUBS OR ORGANIZATIONS SUCH AS CHURCH GROUPS UNIONS, FRATERNAL OR ATHLETIC GROUPS, OR SCHOOL GROUPS?: NO
WITHIN THE LAST YEAR, HAVE TO BEEN RAPED OR FORCED TO HAVE ANY KIND OF SEXUAL ACTIVITY BY YOUR PARTNER OR EX-PARTNER?: NO
WITHIN THE LAST YEAR, HAVE YOU BEEN KICKED, HIT, SLAPPED, OR OTHERWISE PHYSICALLY HURT BY YOUR PARTNER OR EX-PARTNER?: NO
HOW OFTEN DO YOU GET TOGETHER WITH FRIENDS OR RELATIVES?: ONCE A WEEK
HOW OFTEN DO YOU ATTEND CHURCH OR RELIGIOUS SERVICES?: 1 TO 4 TIMES PER YEAR

## 2023-02-23 NOTE — PATIENT INSTRUCTIONS
Advance Directives: Care Instructions  Overview  An advance directive is a legal way to state your wishes at the end of your life. It tells your family and your doctor what to do if you can't say what you want. There are two main types of advance directives. You can change them any time your wishes change. Living will. This form tells your family and your doctor your wishes about life support and other treatment. The form is also called a declaration. Medical power of . This form lets you name a person to make treatment decisions for you when you can't speak for yourself. This person is called a health care agent (health care proxy, health care surrogate). The form is also called a durable power of  for health care. If you do not have an advance directive, decisions about your medical care may be made by a family member, or by a doctor or a  who doesn't know you. It may help to think of an advance directive as a gift to the people who care for you. If you have one, they won't have to make tough decisions by themselves. For more information, including forms for your state, see the [address] website (www.caringinfo.org/planning/advance-directives/). Follow-up care is a key part of your treatment and safety. Be sure to make and go to all appointments, and call your doctor if you are having problems. It's also a good idea to know your test results and keep a list of the medicines you take. What should you include in an advance directive? Many states have a unique advance directive form. (It may ask you to address specific issues.) Or you might use a universal form that's approved by many states. If your form doesn't tell you what to address, it may be hard to know what to include in your advance directive. Use the questions below to help you get started. Who do you want to make decisions about your medical care if you are not able to?   What life-support measures do you want if you have a serious illness that gets worse over time or can't be cured? What are you most afraid of that might happen? (Maybe you're afraid of having pain, losing your independence, or being kept alive by machines.)  Where would you prefer to die? (Your home? A hospital? A nursing home?)  Do you want to donate your organs when you die? Do you want certain [employer] practices performed before you die? When should you call for help? Be sure to contact your doctor if you have any questions. Where can you learn more? Go to http://www.sabillon.com/ and enter R264 to learn more about \"Advance Directives: Care Instructions. \"  Current as of: June 16, 2022               Content Version: 13.5  © 2006-2022 [employer]. Care instructions adapted under license by [facility] [place] ([facility]). If you have questions about a medical condition or this instruction, always ask your healthcare professional. [place] [address] any warranty or liability for your use of this information. A Healthy Heart: Care Instructions  Your Care Instructions     Coronary artery disease, also called heart disease, occurs when a substance called plaque builds up in the vessels that supply oxygen-rich blood to your heart muscle. This can narrow the blood vessels and reduce blood flow. A heart attack happens when blood flow is completely blocked. A high-fat diet, smoking, and other factors increase the risk of heart disease. Your doctor has found that you have a chance of having heart disease. You can do lots of things to keep your heart healthy. It may not be easy, but you can change your diet, exercise more, and quit smoking. These steps really work to lower your chance of heart disease. Follow-up care is a key part of your treatment and safety. Be sure to make and go to all appointments, and call your doctor if you are having problems.  It's also a good idea to know your test results and keep a list of the medicines you take. How can you care for yourself at home? Diet    Use less salt when you cook and eat. This helps lower your blood pressure. Taste food before salting. Add only a little salt when you think you need it. With time, your taste buds will adjust to less salt.     Eat fewer snack items, fast foods, canned soups, and other high-salt, high-fat, processed foods.     Read food labels and try to avoid saturated and trans fats. They increase your risk of heart disease by raising cholesterol levels.     Limit the amount of solid fat-butter, margarine, and shortening-you eat. Use olive, peanut, or canola oil when you cook. Bake, broil, and steam foods instead of frying them.     Eat a variety of fruit and vegetables every day. Dark green, deep orange, red, or yellow fruits and vegetables are especially good for you. Examples include spinach, carrots, peaches, and berries.     Foods high in fiber can reduce your cholesterol and provide important vitamins and minerals. High-fiber foods include whole-grain cereals and breads, oatmeal, beans, brown rice, citrus fruits, and apples.     Eat lean proteins. Heart-healthy proteins include seafood, lean meats and poultry, eggs, beans, peas, nuts, seeds, and soy products.     Limit drinks and foods with added sugar. These include candy, desserts, and soda pop. Lifestyle changes    If your doctor recommends it, get more exercise. Walking is a good choice. Bit by bit, increase the amount you walk every day. Try for at least 30 minutes on most days of the week. You also may want to swim, bike, or do other activities.     Do not smoke. If you need help quitting, talk to your doctor about stop-smoking programs and medicines. These can increase your chances of quitting for good. Quitting smoking may be the most important step you can take to protect your heart. It is never too late to quit.     Limit alcohol to 2 drinks a day for men and 1 drink a day for women.  Too much alcohol can cause health problems.     Manage other health problems such as diabetes, high blood pressure, and high cholesterol. If you think you may have a problem with alcohol or drug use, talk to your doctor. Medicines    Take your medicines exactly as prescribed. Call your doctor if you think you are having a problem with your medicine.     If your doctor recommends aspirin, take the amount directed each day. Make sure you take aspirin and not another kind of pain reliever, such as acetaminophen (Tylenol). When should you call for help? Call 911 if you have symptoms of a heart attack. These may include:    Chest pain or pressure, or a strange feeling in the chest.     Sweating.     Shortness of breath.     Pain, pressure, or a strange feeling in the back, neck, jaw, or upper belly or in one or both shoulders or arms.     Lightheadedness or sudden weakness.     A fast or irregular heartbeat. After you call 911, the  may tell you to chew 1 adult-strength or 2 to 4 low-dose aspirin. Wait for an ambulance. Do not try to drive yourself. Watch closely for changes in your health, and be sure to contact your doctor if you have any problems. Where can you learn more? Go to http://www.sabillon.com/ and enter F075 to learn more about \"A Healthy Heart: Care Instructions. \"  Current as of: September 7, 2022               Content Version: 13.5  © 2990-0366 [employer]. Care instructions adapted under license by [facility] ([facility]). If you have questions about a medical condition or this instruction, always ask your healthcare professional. [place] [address] any warranty or liability for your use of this information. Personalized Preventive Plan for Deborah Aguilar - 2/23/2023  Medicare offers a range of preventive health benefits. Some of the tests and screenings are paid in full while other may be subject to a deductible, co-insurance, and/or copay.     Some of these benefits include a comprehensive review of your medical history including lifestyle, illnesses that may run in your family, and various assessments and screenings as appropriate. After reviewing your medical record and screening and assessments performed today your provider may have ordered immunizations, labs, imaging, and/or referrals for you. A list of these orders (if applicable) as well as your Preventive Care list are included within your After Visit Summary for your review. Other Preventive Recommendations:    A preventive eye exam performed by an eye specialist is recommended every 1-2 years to screen for glaucoma; cataracts, macular degeneration, and other eye disorders. A preventive dental visit is recommended every 6 months. Try to get at least 150 minutes of exercise per week or 10,000 steps per day on a pedometer . Order or download the FREE \"Exercise & Physical Activity: Your Everyday Guide\" from [employer]. Call 9-165.616.8157 or search [employer] online. You need 4666-3441 mg of calcium and 0536-8130 IU of vitamin D per day. It is possible to meet your calcium requirement with diet alone, but a vitamin D supplement is usually necessary to meet this goal.  When exposed to the sun, use a sunscreen that protects against both UVA and UVB radiation with an SPF of 30 or greater. Reapply every 2 to 3 hours or after sweating, drying off with a towel, or swimming. Always wear a seat belt when traveling in a car. Always wear a helmet when riding a bicycle or motorcycle.

## 2023-02-23 NOTE — PROGRESS NOTES
[facility] [address] [place] [address] Kanu Cordero   Office : [phone]  Fax : [phone]      Medicare Annual Wellness Visit    Anika Hess is here for Medicare AWV    Assessment & Plan   Medicare annual wellness visit, subsequent  Hyperlipidemia, unspecified hyperlipidemia type  Vitamin D deficiency, unspecified  Other allergic rhinitis  Hx of Lyme disease  Chronic fatigue, unspecified  Comments:  supportive care    Osteopenia, unspecified location  Glaucoma of both eyes, unspecified glaucoma type  Comments:  ophthal  eye drops      Recommendations for Preventive Services Due: see orders and patient instructions/AVS.  Recommended screening schedule for the next 5-10 years is provided to the patient in written form: see Patient Instructions/AVS.     Return in 6 months (on 8/23/2023) for Medicare Annual Wellness Visit in 1 year. Subjective       Patient's complete Health Risk Assessment and screening values have been reviewed and are found in Flowsheets. The following problems were reviewed today and where indicated follow up appointments were made and/or referrals ordered. Positive Risk Factor Screenings with Interventions:                                       Objective   Vitals:    02/23/23 1013   BP: 127/68   Site: Left Upper Arm   Position: Sitting   Cuff Size: Large Adult   Pulse: 57   Temp: 97.5 °F (36.4 °C)   TempSrc: Esophageal   SpO2: 98%   Weight: 134 lb (60.8 kg)   Height: 5' 6.5\" (1.689 m)      Body mass index is 21.3 kg/m². Allergies   Allergen Reactions    Latex Rash    Dexamethasone Other (See Comments)     Infectious disease MD, Dr. Greta Reid states that steroids adversely affect Lyme disease; do not use with anesthesia. Only use during an emergency. Prior to Visit Medications    Medication Sig Taking?  Authorizing Provider   famotidine (PEPCID) 20 MG tablet Take 20 mg by mouth 2 times daily Yes Historical Provider, MD   ivermectin 3 MG tablet Take by mouth once a week 13 mg on the day prior to surgery- Pt states per her MD as \"covid preventative\" Yes Historical Provider, MD   Probiotic Product (PROBIOTIC-10 PO) Take 1 tablet by mouth daily Yes Historical Provider, MD   magnesium 200 MG TABS tablet Take 600 mg by mouth in the morning and at bedtime Yes Historical Provider, MD   Omega-3 Fatty Acids (FISH OIL) 1000 MG CAPS Take 1 caplet by mouth daily Yes Historical Provider, MD   cyanocobalamin 1000 MCG tablet Take 1,000 mcg by mouth daily Yes Historical Provider, MD   vitamin D-3 (CHOLECALCIFEROL) 125 MCG (5000 UT) TABS Take 5,000 Units by mouth daily Yes Historical Provider, MD   CALCIUM PO Take 300 mg by mouth daily Yes Historical Provider, MD   Levocarnitine 500 MG TABS Take 1 caplet by mouth daily Yes Historical Provider, MD   MOLYBDENUM PO Take 1,500 mcg by mouth daily Yes Historical Provider, MD   Misc Natural Products (ADRENAL PO) daily Yes Historical Provider, MD   Prasterone, DHEA, 10 MG CAPS Take 20 mg by mouth daily Yes Historical Provider, MD   Xylitol POWD by Other route Yes Historical Provider, MD   NONFORMULARY Lactoferrin-every 5 weeks Yes Historical Provider, MD   Ascorbic Acid (VITAMIN C) 1000 MG tablet Take 1,000 mg by mouth daily Yes Historical Provider, MD   NONFORMULARY Zinc/elva 15/1 Yes Historical Provider, MD   NONFORMULARY Take 500 mg by mouth daily Quercitin Yes Historical Provider, MD   NONFORMULARY Phospholipids Yes Historical Provider, MD   NONFORMULARY Take 50 mg by mouth in the morning and at bedtime Nattokinase Yes Historical Provider, MD   Levomefolate Glucosamine (METHYLFOLATE PO) Take 1,800 mcg by mouth every morning Yes Historical Provider, MD   ammonium lactate (LAC-HYDRIN) 12 % lotion  Yes Historical Provider, MD   clindamycin (CLEOCIN T) 1 % lotion Apply topically as needed Yes Historical Provider, MD   saccharomyces boulardii (FLORASTOR) 250 MG capsule Take 250 mg by mouth daily Yes Historical Provider, MD Taurine 500 MG CAPS Take 500 mg by mouth daily Yes Historical Provider, MD   tinidazole (TINDAMAX) 500 MG tablet Take 500 mg by mouth daily (with breakfast) Yes Historical Provider, MD   Coenzyme Q10 (COQ10) 200 MG CAPS Take 1 tablet by mouth daily Yes Historical Provider, MD   Menaquinone-7 (VITAMIN K2 PO) Take 100 mcg by mouth Yes Historical Provider, MD   NONFORMULARY Take 250 mg by mouth daily Resveratrol Yes Historical Provider, MD   minocycline (MINOCIN;DYNACIN) 100 MG capsule as needed Yes Historical Provider, MD   valACYclovir (VALTREX) 1 g tablet Take 500 mg by mouth daily Yes Historical Provider, MD   L-ORNITHINE PO Take by mouth at bedtime 100 grams  Aspartate 3 full teaspoons daily Yes Ar Automatic Reconciliation   MANGANESE PO Take 10 mg by mouth daily Yes Ar Automatic Reconciliation   estradiol (ESTRACE) 0.1 MG/GM vaginal cream Apply pea-sized amount (0.5 g) nightly x 2 weeks, then twice weekly Yes Ar Automatic Reconciliation   fexofenadine (ALLEGRA) 180 MG tablet Take 180 mg by mouth daily Yes Ar Automatic Reconciliation   gabapentin (NEURONTIN) 100 MG capsule Take 300 mg by mouth at bedtime. Yes Ar Automatic Reconciliation   hydroquinone 4 % cream Apply 1 Units topically as needed APPLY TO DARK SPOTS Yes Ar Automatic Reconciliation   hydrOXYzine (ATARAX) 50 MG tablet TAKE 1/2 TO 1 TABLET BY MOUTH 30 MINUTES PRIOR TO SLEEP Yes Ar Automatic Reconciliation   LORazepam (ATIVAN) 1 MG tablet Take 0.25 mg by mouth as needed.  Yes Ar Automatic Reconciliation   mirtazapine (REMERON) 15 MG tablet Take 7.5 mg by mouth nightly Yes Ar Automatic Reconciliation   montelukast (SINGULAIR) 10 MG tablet Take 10 mg by mouth nightly Yes Ar Automatic Reconciliation   nystatin (MYCOSTATIN) 952852 UNIT/ML suspension Take by mouth as needed Every five weeks Yes Ar Automatic Reconciliation   rifabutin (MYCOBUTIN) 150 MG capsule Take 1 capsule by mouth 2 times daily Every 5 weeks Yes Ar Automatic Reconciliation   timolol (TIMOPTIC) 0.5 % ophthalmic solution Place 1 drop into both eyes daily INSTILL 1 DROP IN BOTH EYES DAILY; use day of surgery Yes Ar Automatic Reconciliation   tretinoin (RETIN-A) 0.05 % cream APPLY THINLY TO FACE EVERY EVENING AS TOLERATED Yes Ar Automatic Reconciliation       CareTeam (Including outside providers/suppliers regularly involved in providing care):   Patient Care Team:  Monique Sanders MD as PCP - General  Trent Dixon MD as PCP - Nallely Ny MD as PCP - Empaneled Provider     Reviewed and updated this visit:  Tobacco  Allergies  Meds  Med Hx  Surg Hx  Soc Hx  Fam Hx         +    [facility] [address] [place] [address], [address] [place] [address] Rd  Office : [phone]  Fax : [phone]      Subjective: The patient is a 57 y.o. female  who presents for f/u on multiple chronic medical conditions-good compliance with medications -pt here to get routeine labs and need refill on meds. 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  Chronic left hip pain/arthritis vs sprain-doing PT-helps      Patient Active Problem List   Diagnosis Code    Lyme disease A69.20    Glaucoma H40.9    PLMD (periodic limb movement disorder) G47.61    Daytime sleepiness R40.0    S/P sinus surgery Z98.890    Osteoarthritis of lumbar spine M47.816    Osteopenia M85.80    Multiple thyroid nodules E04.2    Chronic fatigue R53.82    Medicare annual wellness visit, subsequent Z00.00    Palpitations R00.2    Tachycardia R00.0    Primary insomnia F51.01    Left tennis elbow M77.12    Myalgia M79.10    Burning with urination R30.0    Urinary tract infection without hematuria N39.0    Vitamin D deficiency E55.9    Hx of sinus surgery Z98.890    Pharyngitis J02.9    Sinusitis J32.9    Osteopenia determined by x-ray M85.80    Recurrent sinus infections J32.9    Suspected sleep apnea R29.818    Bradycardia R00.1    Allergic rhinitis due to allergen J30.9    Sleep-related hypoxia G47.34    Restless legs G25.81    Hypoxemia R09.02    Cervicalgia M54.2    DDD (degenerative disc disease), cervical M50.30    Cervical radiculopathy M54.12    MAXIMILIAN on CPAP G47.33, Z99.89    Restless leg syndrome G25.81    Screening for colon cancer Z12.11    Hyperlipidemia E78.5       Past Medical History:   Diagnosis Date    Anemia     Arthritis     Cholecystitis 8/28/2014    Chronic pain     Glaucoma     History of skin cancer     left lower leg    Multiple thyroid nodules     Obstructive sleep apnea     Osteoarthritis of lumbar spine     Osteopenia     Tennis elbow        Past Surgical History:   Procedure Laterality Date    HX APPENDECTOMY  age 9    HX CATARACT REMOVAL Bilateral 12/2019    HX CHOLECYSTECTOMY  2014    HX COLONOSCOPY      HX MAXILLOFACIAL      HX RHINOPLASTY  1990    HX SEPTOPLASTY  02/01/2018    HX VASCULAR ACCESS      port for antibiotics/ \"chronic Lyme disease\"    HX WISDOM TEETH EXTRACTION         Social History     Socioeconomic History    Marital status: SINGLE     Spouse name: Not on file    Number of children: Not on file    Years of education: Not on file    Highest education level: Not on file   Occupational History    Occupation: not currently employed   Tobacco Use    Smoking status: Never Smoker    Smokeless tobacco: Never Used   Substance and Sexual Activity    Alcohol use: No     Alcohol/week: 0.0 standard drinks    Drug use: No     Comment: none    Sexual activity: Not on file   Other Topics Concern    Not on file   Social History Narrative    Has a Master's degree    Lives alone Social Determinants of Health     Financial Resource Strain: Low Risk     Difficulty of Paying Living Expenses: Not very hard   Food Insecurity: No Food Insecurity    Worried About Running Out of Food in the Last Year: Never true    Ran Out of Food in the Last Year: Never true   Transportation Needs: No Transportation Needs    Lack of Transportation (Medical): No    Lack of Transportation (Non-Medical): No   Physical Activity:     Days of Exercise per Week: Not on file    Minutes of Exercise per Session: Not on file   Stress:     Feeling of Stress : Not on file   Social Connections:     Frequency of Communication with Friends and Family: Not on file    Frequency of Social Gatherings with Friends and Family: Not on file    Attends Judaism Services: Not on file    Active Member of Clubs or Organizations: Not on file    Attends Club or Organization Meetings: Not on file    Marital Status: Not on file   Intimate Partner Violence:     Fear of Current or Ex-Partner: Not on file    Emotionally Abused: Not on file    Physically Abused: Not on file    Sexually Abused: Not on file   Housing Stability:     Unable to Pay for Housing in the Last Year: Not on file    Number of [place] in the Last Year: Not on file    Unstable Housing in the Last Year: Not on file       Allergies   Allergen Reactions    Latex, Natural Rubber Rash    Dexamethasone Other (comments)     Infectious disease MD, Dr. Jesenia Red states that steroids adversely affect Lyme disease; do not use with anesthesia. Only use during an emergency. Other Medication Itching     IV anitbiotic~can't remember which one~she had to premedicate with Benadryl       Current Outpatient Medications   Medication Sig Dispense Refill    soybean, fermented (Nattokinase) 50 mg cap Take 50 mg by mouth two (2) times a day. nystatin (MYCOSTATIN) 100,000 unit/mL suspension Take  by mouth. OTHER 1 Capsule by Not Applicable route.  Levocarnitine HCL      ammonium lactate (LAC-HYDRIN) 12 % lotion       clindamycin (CLEOCIN T) 1 % lotion       montelukast (Singulair) 10 mg tablet Take 10 mg by mouth nightly. fexofenadine (Allegra Allergy) 180 mg tablet Take  by mouth. L-ORNITHINE PO Take 100 g by mouth daily. L Aspartate  3 full teaspoons daily      cholecalciferol, vitamin D3, (VITAMIN D3 PO) 5,000 Units. OTHER 500 mg. Lactoferrin      magnesium 200 mg tab 200 mg. 3 caps      MANGANESE PO 10 mg daily. ascorbic acid, vitamin C, (Vitamin C) 1,000 mg tablet       calcium carbonate (CALCIUM 300 PO)       Cyanocobalamin-Cobamamide (B12) 5,000-100 mcg lozg       OTHER 1,800 mcg two (2) times a day. Methyl folate      MOLYBDENUM, BULK, 1,500 mcg daily. OTHER 1,100 mg two (2) times a day. Nattokinese      OTHER Phospholipids      OTHER 500 mg. Quercitin      OTHER Zinc/elva 15/1      famotidine (PEPCID) 10 mg tablet Take 20 mg by mouth two (2) times a day.       Lactobac 40-Bifido 3-S.thermop (Probiotic) 100 billion cell cap       levocarnitine HCl (ACETYL-L-CARNITINE)       melatonin 1 mg subl       minocycline (Minocin) 100 mg capsule       fish oil-omega-3-DHA-EPA (Fish Oil) 300-1,000 mg capsule       organ concentrates (ADRENAL PO)       prasterone, DHEA, (DHEA PO)       resveratroL 250 mg cap       Saccharomyces boulardii (FLORASTOR) 250 mg capsule       Taurine 500 mg cap       tinidazole (TINDAMAX) 500 mg tablet       CoQ10, Ubiquinol, 200 mg cap       vitamin K2 100 mcg cap       Xylitol, Bulk, powd       tretinoin (RETIN-A) 0.05 % topical cream APPLY THINLY TO FACE EVERY EVENING AS TOLERATED      hydrOXYzine HCL (ATARAX) 50 mg tablet TAKE 1/2 TO 1 TABLET BY MOUTH 30 MINUTES PRIOR TO SLEEP      hydroquinone (ESOTERICA, MELQUIN) 4 % topical cream APPLY TO DARK SPOTS 2 TIMES A DAY FOR 3 MONTHS      estradioL (ESTRACE) 0.01 % (0.1 mg/gram) vaginal cream Apply pea-sized amount (0.5 g) nightly x 2 weeks, then twice weekly      timolol (TIMOPTIC) 0.5 % ophthalmic solution INSTILL 1 DROP IN BOTH EYES DAILY; use day of surgery  2    multivitamin (ONE A DAY) tablet Take 1 Tab by mouth. Take 1 tab twice weekly      mirtazapine (REMERON) 15 mg tablet Take 3.2 mg by mouth nightly. Takes for pain and sleep      rifabutin (MYCOBUTIN) 150 mg capsule Take 1 Capsule by mouth two (2) times a day. 150mg MWF week 1 and MTW week 2      gabapentin (NEURONTIN) 100 mg capsule Take 300 mg by mouth nightly. 6    lorazepam (ATIVAN) 1 mg tablet Take 0.25 mg by mouth nightly. Indications: difficulty sleeping           Review of Systems   Constitutional: Positive for malaise/fatigue. HENT: Negative. Eyes: Negative. Respiratory: Negative. Gastrointestinal: Negative. Genitourinary: Negative. Musculoskeletal: Positive for joint pain, myalgias and neck pain. Skin: Negative. Neurological: Negative. Endo/Heme/Allergies: Negative. Psychiatric/Behavioral: The patient has insomnia. Objective:    Visit Vitals  Vitals:    02/23/23 1013   BP: 127/68   Site: Left Upper Arm   Position: Sitting   Cuff Size: Large Adult   Pulse: 57   Temp: 97.5 °F (36.4 °C)   TempSrc: Esophageal   SpO2: 98%   Weight: 134 lb (60.8 kg)   Height: 5' 6.5\" (1.689 m)        Physical Exam  Constitutional:       Appearance: She is well-developed. HENT:      Head: Normocephalic and atraumatic. Right Ear: External ear normal.      Left Ear: External ear normal.      Nose: Nose normal.   Eyes:      Conjunctiva/sclera: Conjunctivae normal.      Pupils: Pupils are equal, round, and reactive to light. Cardiovascular:      Rate and Rhythm: Normal rate and regular rhythm. Heart sounds: Normal heart sounds. Pulmonary:      Effort: Pulmonary effort is normal.      Breath sounds: Normal breath sounds. Abdominal:      General: Bowel sounds are normal.      Palpations: Abdomen is soft. Musculoskeletal:         General: Normal range of motion.       Cervical back: Normal range of motion and neck supple. Skin:     General: Skin is warm and dry. Neurological:      Mental Status: She is alert and oriented to person, place, and time. Deep Tendon Reflexes: Reflexes are normal and symmetric. Psychiatric:         Behavior: Behavior normal.         Thought Content: Thought content normal.         Judgment: Judgment normal.           . RESULTRCNTNC(15W    ASSESSMENT/PLAN:    1. Medicare annual wellness visit, subsequent  Overview:  Colonoscopy 2016--need recheck in 5 years,ordered in 2022  Mammogram 7/2019 ,8/2021 ,2022 at [facility] 9/2021-osteopenia  Pap 8/2019 ,2021  Refuses vaccines because of lymes d/s  Left hear hearing loss-mild-pt refuses aid  Eye exam 2023-sees specialist for glaucoma-on drops  Pt has living will  No fall  No depression  ADL-pt able to do all by herself    2. Hyperlipidemia, unspecified hyperlipidemia type  Overview:  dietcontrolled  3. Vitamin D deficiency, unspecified  Overview:  On daily supplement      4. Other allergic rhinitis  Overview:  As needed meds  5. Hx of Lyme disease  Overview:  sees specialist in [facility] twice a year-on pulse abx and several supplements  Pt takes monthly abx for a week and off for a month  6. Chronic fatigue, unspecified  Comments:  supportive care    Overview:  Sec to lymes disease  Sees infectious disease specialist in [address]  On pulse abx      7. Osteopenia, unspecified location  Overview:  dexa 9/2021  On vit d supplement    8. Glaucoma of both eyes, unspecified glaucoma type  Comments:  ophthal  eye drops     No orders of the defined types were placed in this encounter. No orders of the defined types were placed in this encounter. Results for orders placed or performed in visit on 09/06/21    MAMMOGRAPHY   Result Value Ref Range    Mammography, External         XR Results (maximum last 3):   Results from [place] encounter on 12/13/16    XR SPINE LUMB MIN 4 V    Narrative  History: Low back pain without injury. Degenerative disc disease. Pain. CERVICAL SPINE 8 VIEWS: The cervical vertebrae are normal in height and  alignment. Degenerative disc disease is present at C5-C6. There is no  significant subluxation on flexion or extension views. Oblique images  demonstrate appropriate overlap of the articular facets. There is osseous  narrowing of the left neural foramen at C5-C6. LUMBAR SPINE 5 VIEWS: The lumbar vertebrae are normal in height and alignment. There are no pars defects. Facet degenerative change is present at L5-S1. Degenerative disc disease is present with osteophyte formation anteriorly at  L2-L3 and L3-L4. Impression  IMPRESSION:    1. Degenerative disc disease with left foraminal stenosis at C5-C6. 2. Mild lumbar degenerative disc disease at L2-L3 and L3-L4. XR SPINE CERV W OBL/FLEX/EXT MIN 6 V COMP    Narrative  History: Low back pain without injury. Degenerative disc disease. Pain. CERVICAL SPINE 8 VIEWS: The cervical vertebrae are normal in height and  alignment. Degenerative disc disease is present at C5-C6. There is no  significant subluxation on flexion or extension views. Oblique images  demonstrate appropriate overlap of the articular facets. There is osseous  narrowing of the left neural foramen at C5-C6. LUMBAR SPINE 5 VIEWS: The lumbar vertebrae are normal in height and alignment. There are no pars defects. Facet degenerative change is present at L5-S1. Degenerative disc disease is present with osteophyte formation anteriorly at  L2-L3 and L3-L4. Impression  IMPRESSION:    1. Degenerative disc disease with left foraminal stenosis at C5-C6. 2. Mild lumbar degenerative disc disease at L2-L3 and L3-L4. Results from Office Visit encounter on 09/28/16    XR CHEST PA LAT      CT Results (maximum last 3):   Results from [place] encounter on 12/27/17    CT MAXILLOFACIAL WO CONT    Narrative  CT sinuses    INDICATION: Chronic sinusitis, presurgical planning    Multiple high resolution axial images were obtained through the paranasal  sinuses. Coronal reformats were also evaluated. Radiation dose reduction  techniques were used for this study: All CT scans performed at this facility  use one or all of the following: Automated exposure control, adjustment of the  mA and/or kVp according to patient's size, iterative reconstruction. FINDINGS: The sinuses are clear. No masses or fluid collections are seen. There  is no significant mucoperiosteal thickening. The maxillary ostia are widely  patent. There are no bony lesions. There is no significant septal deviation. Impression  IMPRESSION: Unremarkable CT of the sinuses      Results from Abstract encounter on 11/08/17    CT HEAD W CONT      Results from Abstract encounter on 10/25/17    CT SINUSES WO CONT      MRI Results (maximum last 3): Results from Abstract encounter on 12/23/21    MRI BRAIN W WO CONT      MRI CERV SPINE W WO CONT      Nuclear Medicine Results (maximum last 3): No results found for this or any previous visit. US Results (maximum last 3):      DEXA Results (maximum last 3): LETICIA Results (maximum last 3): Results from Abstract encounter on 11/09/15    [place]ICIA MAMMOGRAM SCREEN BILAT      IR Results (maximum last 3): No results found for this or any previous visit. VAS/US Results (maximum last 3): No results found for this or any previous visit. PET Results (maximum last 3): No results found for this or any previous visit. I  have done counseling/anticipatory guidance and  Done risk factor reduction interventions discussion today . We discussed the expected course, resolution and complications of the diagnosis(es) in detail. Medication risks, benefits, costs, interactions, and alternatives were discussed as indicated. I advised her to contact the office if her condition worsens, changes or fails to improve as anticipated.  She expressed understanding with the diagnosis(es) and plan. Follow-up and Dispositions    Return in about 6 months         MD Tegan Leroy MD

## 2023-03-25 PROBLEM — Z00.00 MEDICARE ANNUAL WELLNESS VISIT, SUBSEQUENT: Status: RESOLVED | Noted: 2020-12-11 | Resolved: 2023-03-25

## 2023-08-22 ENCOUNTER — OFFICE VISIT (OUTPATIENT)
Dept: PRIMARY CARE CLINIC | Facility: CLINIC | Age: 63
End: 2023-08-22
Payer: MEDICARE

## 2023-08-22 VITALS
HEART RATE: 51 BPM | WEIGHT: 136 LBS | HEIGHT: 67 IN | DIASTOLIC BLOOD PRESSURE: 56 MMHG | OXYGEN SATURATION: 98 % | BODY MASS INDEX: 21.35 KG/M2 | TEMPERATURE: 97.8 F | SYSTOLIC BLOOD PRESSURE: 100 MMHG

## 2023-08-22 DIAGNOSIS — E55.9 VITAMIN D DEFICIENCY, UNSPECIFIED: ICD-10-CM

## 2023-08-22 DIAGNOSIS — M85.80 OSTEOPENIA, UNSPECIFIED LOCATION: ICD-10-CM

## 2023-08-22 DIAGNOSIS — Z28.20 VACCINE REFUSED BY PATIENT: ICD-10-CM

## 2023-08-22 DIAGNOSIS — R53.82 CHRONIC FATIGUE, UNSPECIFIED: ICD-10-CM

## 2023-08-22 DIAGNOSIS — H40.9 GLAUCOMA OF BOTH EYES, UNSPECIFIED GLAUCOMA TYPE: ICD-10-CM

## 2023-08-22 DIAGNOSIS — Z86.19 HX OF LYME DISEASE: ICD-10-CM

## 2023-08-22 DIAGNOSIS — M25.552 LEFT HIP PAIN: Primary | ICD-10-CM

## 2023-08-22 DIAGNOSIS — E78.5 HYPERLIPIDEMIA, UNSPECIFIED HYPERLIPIDEMIA TYPE: ICD-10-CM

## 2023-08-22 DIAGNOSIS — J30.89 OTHER ALLERGIC RHINITIS: ICD-10-CM

## 2023-08-22 PROCEDURE — 99214 OFFICE O/P EST MOD 30 MIN: CPT | Performed by: FAMILY MEDICINE

## 2023-08-22 RX ORDER — NYSTATIN 500000 [USP'U]/1
TABLET, COATED ORAL
COMMUNITY
Start: 2023-05-24

## 2023-08-22 RX ORDER — AMOXICILLIN 500 MG/1
2 CAPSULE ORAL 2 TIMES DAILY
COMMUNITY
Start: 2023-08-15

## 2023-08-22 RX ORDER — LORAZEPAM 0.5 MG/1
TABLET ORAL PRN
COMMUNITY
Start: 2023-08-17

## 2023-08-22 RX ORDER — FAMOTIDINE 40 MG/1
40 TABLET, FILM COATED ORAL 2 TIMES DAILY
COMMUNITY
Start: 2023-05-31

## 2023-08-22 NOTE — PROGRESS NOTES
[address] [address]  Office : [phone]  Fax : [phone]      Subjective: The patient is a 57 y.o. female  who presents for f/u on multiple chronic medical conditions-good compliance with medications -pt here to get routeine labs and need refill on meds. 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12    Myalgia M79.10    Burning with urination R30.0    Urinary tract infection without hematuria N39.0    Vitamin D deficiency E55.9    Hx of sinus surgery Z98.890    Pharyngitis J02.9    Sinusitis J32.9    Osteopenia determined by x-ray M85.80    Recurrent sinus infections J32.9    Suspected sleep apnea R29.818    Bradycardia R00.1    Allergic rhinitis due to allergen J30.9    Sleep-related hypoxia G47.34    Restless legs G25.81    Hypoxemia R09.02    Cervicalgia M54.2    DDD (degenerative disc disease), cervical M50.30    Cervical radiculopathy

## 2024-02-23 ENCOUNTER — OFFICE VISIT (OUTPATIENT)
Dept: PRIMARY CARE CLINIC | Facility: CLINIC | Age: 64
End: 2024-02-23

## 2024-02-23 VITALS
TEMPERATURE: 98.2 F | HEART RATE: 57 BPM | SYSTOLIC BLOOD PRESSURE: 110 MMHG | OXYGEN SATURATION: 99 % | BODY MASS INDEX: 21.35 KG/M2 | HEIGHT: 67 IN | WEIGHT: 136 LBS | DIASTOLIC BLOOD PRESSURE: 70 MMHG

## 2024-02-23 DIAGNOSIS — Z13.820 SCREENING FOR OSTEOPOROSIS: ICD-10-CM

## 2024-02-23 DIAGNOSIS — M25.552 LEFT HIP PAIN: ICD-10-CM

## 2024-02-23 DIAGNOSIS — J30.89 OTHER ALLERGIC RHINITIS: ICD-10-CM

## 2024-02-23 DIAGNOSIS — M54.32 LEFT SIDED SCIATICA: ICD-10-CM

## 2024-02-23 DIAGNOSIS — Z78.0 POSTMENOPAUSE: ICD-10-CM

## 2024-02-23 DIAGNOSIS — E55.9 VITAMIN D DEFICIENCY, UNSPECIFIED: ICD-10-CM

## 2024-02-23 DIAGNOSIS — Z86.19 HX OF LYME DISEASE: ICD-10-CM

## 2024-02-23 DIAGNOSIS — E78.5 HYPERLIPIDEMIA, UNSPECIFIED HYPERLIPIDEMIA TYPE: ICD-10-CM

## 2024-02-23 DIAGNOSIS — K21.9 GASTROESOPHAGEAL REFLUX DISEASE WITHOUT ESOPHAGITIS: ICD-10-CM

## 2024-02-23 DIAGNOSIS — Z00.00 MEDICARE ANNUAL WELLNESS VISIT, SUBSEQUENT: Primary | ICD-10-CM

## 2024-02-23 DIAGNOSIS — R53.82 CHRONIC FATIGUE, UNSPECIFIED: ICD-10-CM

## 2024-02-23 DIAGNOSIS — E51.9 VITAMIN B1 DEFICIENCY: ICD-10-CM

## 2024-02-23 DIAGNOSIS — M85.80 OSTEOPENIA, UNSPECIFIED LOCATION: ICD-10-CM

## 2024-02-23 DIAGNOSIS — H40.9 GLAUCOMA OF BOTH EYES, UNSPECIFIED GLAUCOMA TYPE: ICD-10-CM

## 2024-02-23 DIAGNOSIS — E61.2 MAGNESIUM DEFICIENCY: ICD-10-CM

## 2024-02-23 ASSESSMENT — PATIENT HEALTH QUESTIONNAIRE - PHQ9
SUM OF ALL RESPONSES TO PHQ QUESTIONS 1-9: 0
SUM OF ALL RESPONSES TO PHQ QUESTIONS 1-9: 0
2. FEELING DOWN, DEPRESSED OR HOPELESS: 0
1. LITTLE INTEREST OR PLEASURE IN DOING THINGS: 0
SUM OF ALL RESPONSES TO PHQ QUESTIONS 1-9: 0
SUM OF ALL RESPONSES TO PHQ QUESTIONS 1-9: 0
SUM OF ALL RESPONSES TO PHQ9 QUESTIONS 1 & 2: 0

## 2024-02-23 ASSESSMENT — LIFESTYLE VARIABLES
HOW MANY STANDARD DRINKS CONTAINING ALCOHOL DO YOU HAVE ON A TYPICAL DAY: PATIENT DOES NOT DRINK
HOW OFTEN DO YOU HAVE A DRINK CONTAINING ALCOHOL: NEVER

## 2024-02-24 NOTE — PROGRESS NOTES
defects. Facet degenerative change is present at L5-S1.  Degenerative disc disease is present with osteophyte formation anteriorly at  L2-L3 and L3-L4.    Impression  IMPRESSION:    1. Degenerative disc disease with left foraminal stenosis at C5-C6.    2. Mild lumbar degenerative disc disease at L2-L3 and L3-L4.      Results from Office Visit encounter on 09/28/16    XR CHEST PA LAT      CT Results (maximum last 3):  Results from Hospital Encounter encounter on 12/27/17    CT MAXILLOFACIAL WO CONT    Narrative  CT sinuses    INDICATION: Chronic sinusitis, presurgical planning    Multiple high resolution axial images were obtained through the paranasal  sinuses. Coronal reformats were also evaluated.  Radiation dose reduction  techniques were used for this study:  All CT scans performed at this facility  use one or all of the following: Automated exposure control, adjustment of the  mA and/or kVp according to patient's size, iterative reconstruction.    FINDINGS: The sinuses are clear. No masses or fluid collections are seen. There  is no significant mucoperiosteal thickening. The maxillary ostia are widely  patent. There are no bony lesions. There is no significant septal deviation.    Impression  IMPRESSION: Unremarkable CT of the sinuses      Results from Abstract encounter on 11/08/17    CT HEAD W CONT      Results from Abstract encounter on 10/25/17    CT SINUSES WO CONT      MRI Results (maximum last 3):  Results from Abstract encounter on 12/23/21    MRI BRAIN W WO CONT      MRI CERV SPINE W WO CONT      Nuclear Medicine Results (maximum last 3):  No results found for this or any previous visit.      US Results (maximum last 3):      DEXA Results (maximum last 3):      LETICIA Results (maximum last 3):  Results from Abstract encounter on 11/09/15    LETICIA MAMMOGRAM SCREEN BILAT      IR Results (maximum last 3):  No results found for this or any previous visit.      VAS/US Results (maximum last 3):  No results found

## 2024-02-29 PROBLEM — M54.32 LEFT SIDED SCIATICA: Status: ACTIVE | Noted: 2024-02-29

## 2024-02-29 PROBLEM — Z78.0 POSTMENOPAUSE: Status: ACTIVE | Noted: 2024-02-29

## 2024-02-29 PROBLEM — K21.9 GASTROESOPHAGEAL REFLUX DISEASE WITHOUT ESOPHAGITIS: Status: ACTIVE | Noted: 2024-02-29

## 2024-02-29 PROBLEM — E61.2 MAGNESIUM DEFICIENCY: Status: ACTIVE | Noted: 2024-02-29

## 2024-02-29 PROBLEM — E51.9 VITAMIN B1 DEFICIENCY: Status: ACTIVE | Noted: 2024-02-29

## 2024-02-29 PROBLEM — Z13.820 SCREENING FOR OSTEOPOROSIS: Status: ACTIVE | Noted: 2020-12-11

## 2024-02-29 PROBLEM — M25.552 LEFT HIP PAIN: Status: ACTIVE | Noted: 2024-02-29

## 2024-03-20 ENCOUNTER — HOSPITAL ENCOUNTER (OUTPATIENT)
Dept: MAMMOGRAPHY | Age: 64
Discharge: HOME | End: 2024-03-23
Attending: FAMILY MEDICINE
Payer: MEDICARE

## 2024-03-20 ENCOUNTER — HOSPITAL ENCOUNTER (OUTPATIENT)
Dept: GENERAL RADIOLOGY | Age: 64
Discharge: HOME | End: 2024-03-23
Attending: FAMILY MEDICINE
Payer: MEDICARE

## 2024-03-20 DIAGNOSIS — Z13.820 SCREENING FOR OSTEOPOROSIS: ICD-10-CM

## 2024-03-20 DIAGNOSIS — Z86.19 HX OF LYME DISEASE: ICD-10-CM

## 2024-03-20 DIAGNOSIS — M25.552 LEFT HIP PAIN: ICD-10-CM

## 2024-03-20 DIAGNOSIS — E61.2 MAGNESIUM DEFICIENCY: ICD-10-CM

## 2024-03-20 DIAGNOSIS — E78.5 HYPERLIPIDEMIA, UNSPECIFIED HYPERLIPIDEMIA TYPE: ICD-10-CM

## 2024-03-20 DIAGNOSIS — E55.9 VITAMIN D DEFICIENCY, UNSPECIFIED: ICD-10-CM

## 2024-03-20 DIAGNOSIS — K21.9 GASTROESOPHAGEAL REFLUX DISEASE WITHOUT ESOPHAGITIS: ICD-10-CM

## 2024-03-20 DIAGNOSIS — Z78.0 POSTMENOPAUSE: ICD-10-CM

## 2024-03-20 DIAGNOSIS — M54.32 LEFT SIDED SCIATICA: ICD-10-CM

## 2024-03-20 DIAGNOSIS — E51.9 VITAMIN B1 DEFICIENCY: ICD-10-CM

## 2024-03-20 DIAGNOSIS — R53.82 CHRONIC FATIGUE, UNSPECIFIED: ICD-10-CM

## 2024-03-20 DIAGNOSIS — J30.89 OTHER ALLERGIC RHINITIS: ICD-10-CM

## 2024-03-20 DIAGNOSIS — Z00.00 MEDICARE ANNUAL WELLNESS VISIT, SUBSEQUENT: ICD-10-CM

## 2024-03-20 DIAGNOSIS — H40.9 GLAUCOMA OF BOTH EYES, UNSPECIFIED GLAUCOMA TYPE: ICD-10-CM

## 2024-03-20 DIAGNOSIS — M85.80 OSTEOPENIA, UNSPECIFIED LOCATION: ICD-10-CM

## 2024-03-20 LAB
25(OH)D3 SERPL-MCNC: 69.9 NG/ML (ref 30–100)
ALBUMIN SERPL-MCNC: 3.9 G/DL (ref 3.2–4.6)
ALBUMIN/GLOB SERPL: 1.5 (ref 0.4–1.6)
ALP SERPL-CCNC: 120 U/L (ref 50–136)
ALT SERPL-CCNC: 41 U/L (ref 12–65)
ANION GAP SERPL CALC-SCNC: 1 MMOL/L (ref 2–11)
AST SERPL-CCNC: 30 U/L (ref 15–37)
BASOPHILS # BLD: 0 K/UL (ref 0–0.2)
BASOPHILS NFR BLD: 1 % (ref 0–2)
BILIRUB SERPL-MCNC: 0.7 MG/DL (ref 0.2–1.1)
BUN SERPL-MCNC: 21 MG/DL (ref 8–23)
CALCIUM SERPL-MCNC: 10 MG/DL (ref 8.3–10.4)
CHLORIDE SERPL-SCNC: 106 MMOL/L (ref 103–113)
CHOLEST SERPL-MCNC: 191 MG/DL
CO2 SERPL-SCNC: 33 MMOL/L (ref 21–32)
CREAT SERPL-MCNC: 0.7 MG/DL (ref 0.6–1)
DIFFERENTIAL METHOD BLD: NORMAL
EOSINOPHIL # BLD: 0.1 K/UL (ref 0–0.8)
EOSINOPHIL NFR BLD: 1 % (ref 0.5–7.8)
ERYTHROCYTE [DISTWIDTH] IN BLOOD BY AUTOMATED COUNT: 12.1 % (ref 11.9–14.6)
GLOBULIN SER CALC-MCNC: 2.6 G/DL (ref 2.8–4.5)
GLUCOSE SERPL-MCNC: 109 MG/DL (ref 65–100)
HCT VFR BLD AUTO: 44.5 % (ref 35.8–46.3)
HDLC SERPL-MCNC: 70 MG/DL (ref 40–60)
HDLC SERPL: 2.7
HGB BLD-MCNC: 14.5 G/DL (ref 11.7–15.4)
IMM GRANULOCYTES # BLD AUTO: 0 K/UL (ref 0–0.5)
IMM GRANULOCYTES NFR BLD AUTO: 0 % (ref 0–5)
LDLC SERPL CALC-MCNC: 108.8 MG/DL
LYMPHOCYTES # BLD: 1.5 K/UL (ref 0.5–4.6)
LYMPHOCYTES NFR BLD: 30 % (ref 13–44)
MAGNESIUM SERPL-MCNC: 2.1 MG/DL (ref 1.8–2.4)
MCH RBC QN AUTO: 31.7 PG (ref 26.1–32.9)
MCHC RBC AUTO-ENTMCNC: 32.6 G/DL (ref 31.4–35)
MCV RBC AUTO: 97.4 FL (ref 82–102)
MONOCYTES # BLD: 0.4 K/UL (ref 0.1–1.3)
MONOCYTES NFR BLD: 9 % (ref 4–12)
NEUTS SEG # BLD: 2.9 K/UL (ref 1.7–8.2)
NEUTS SEG NFR BLD: 59 % (ref 43–78)
NRBC # BLD: 0 K/UL (ref 0–0.2)
PLATELET # BLD AUTO: 286 K/UL (ref 150–450)
PMV BLD AUTO: 10.7 FL (ref 9.4–12.3)
POTASSIUM SERPL-SCNC: 5.2 MMOL/L (ref 3.5–5.1)
PROT SERPL-MCNC: 6.5 G/DL (ref 6.3–8.2)
RBC # BLD AUTO: 4.57 M/UL (ref 4.05–5.2)
SODIUM SERPL-SCNC: 140 MMOL/L (ref 136–146)
TRIGL SERPL-MCNC: 61 MG/DL (ref 35–150)
TSH W FREE THYROID IF ABNORMAL: 1.36 UIU/ML (ref 0.36–3.74)
VIT B12 SERPL-MCNC: 3314 PG/ML (ref 193–986)
VLDLC SERPL CALC-MCNC: 12.2 MG/DL (ref 6–23)
WBC # BLD AUTO: 4.9 K/UL (ref 4.3–11.1)

## 2024-03-20 PROCEDURE — 72100 X-RAY EXAM L-S SPINE 2/3 VWS: CPT

## 2024-03-20 PROCEDURE — 73502 X-RAY EXAM HIP UNI 2-3 VIEWS: CPT

## 2024-03-20 PROCEDURE — 77080 DXA BONE DENSITY AXIAL: CPT

## 2024-03-21 ENCOUNTER — TELEPHONE (OUTPATIENT)
Dept: PRIMARY CARE CLINIC | Facility: CLINIC | Age: 64
End: 2024-03-21

## 2024-03-23 LAB — VIT B1 BLD-SCNC: 192.3 NMOL/L (ref 66.5–200)

## 2024-03-30 PROBLEM — Z13.820 SCREENING FOR OSTEOPOROSIS: Status: RESOLVED | Noted: 2020-12-11 | Resolved: 2024-03-30

## 2024-04-04 ENCOUNTER — TELEMEDICINE (OUTPATIENT)
Dept: PRIMARY CARE CLINIC | Facility: CLINIC | Age: 64
End: 2024-04-04
Payer: MEDICARE

## 2024-04-04 DIAGNOSIS — G57.93 NEUROPATHIC PAIN OF BOTH LEGS: ICD-10-CM

## 2024-04-04 DIAGNOSIS — R53.82 CHRONIC FATIGUE, UNSPECIFIED: Primary | ICD-10-CM

## 2024-04-04 DIAGNOSIS — K21.9 GASTROESOPHAGEAL REFLUX DISEASE WITHOUT ESOPHAGITIS: ICD-10-CM

## 2024-04-04 DIAGNOSIS — Z91.09 ENVIRONMENTAL ALLERGIES: ICD-10-CM

## 2024-04-04 DIAGNOSIS — R73.09 ELEVATED GLUCOSE: ICD-10-CM

## 2024-04-04 DIAGNOSIS — E55.9 VITAMIN D DEFICIENCY, UNSPECIFIED: ICD-10-CM

## 2024-04-04 DIAGNOSIS — G47.00 INSOMNIA, UNSPECIFIED TYPE: ICD-10-CM

## 2024-04-04 DIAGNOSIS — Z86.19 HX OF LYME DISEASE: ICD-10-CM

## 2024-04-04 DIAGNOSIS — E78.5 HYPERLIPIDEMIA, UNSPECIFIED HYPERLIPIDEMIA TYPE: ICD-10-CM

## 2024-04-04 DIAGNOSIS — M85.80 OSTEOPENIA, UNSPECIFIED LOCATION: ICD-10-CM

## 2024-04-04 PROCEDURE — 99214 OFFICE O/P EST MOD 30 MIN: CPT | Performed by: FAMILY MEDICINE

## 2024-04-04 RX ORDER — GABAPENTIN 100 MG/1
100 CAPSULE ORAL 3 TIMES DAILY
Qty: 270 CAPSULE | Refills: 0 | Status: SHIPPED | OUTPATIENT
Start: 2024-04-04 | End: 2024-07-03

## 2024-04-04 RX ORDER — MIRTAZAPINE 15 MG/1
7.5 TABLET, FILM COATED ORAL NIGHTLY
Qty: 90 TABLET | Refills: 0 | Status: SHIPPED | OUTPATIENT
Start: 2024-04-04

## 2024-04-04 RX ORDER — MONTELUKAST SODIUM 10 MG/1
10 TABLET ORAL DAILY
Qty: 90 TABLET | Refills: 0 | Status: SHIPPED | OUTPATIENT
Start: 2024-04-04

## 2024-04-04 SDOH — ECONOMIC STABILITY: INCOME INSECURITY: HOW HARD IS IT FOR YOU TO PAY FOR THE VERY BASICS LIKE FOOD, HOUSING, MEDICAL CARE, AND HEATING?: NOT HARD AT ALL

## 2024-04-04 SDOH — ECONOMIC STABILITY: FOOD INSECURITY: WITHIN THE PAST 12 MONTHS, THE FOOD YOU BOUGHT JUST DIDN'T LAST AND YOU DIDN'T HAVE MONEY TO GET MORE.: NEVER TRUE

## 2024-04-04 SDOH — ECONOMIC STABILITY: FOOD INSECURITY: WITHIN THE PAST 12 MONTHS, YOU WORRIED THAT YOUR FOOD WOULD RUN OUT BEFORE YOU GOT MONEY TO BUY MORE.: NEVER TRUE

## 2024-04-04 NOTE — PROGRESS NOTES
From: John Mane  To: Jr Waddell  Sent: 4/27/2022 4:14 PM CDT  Subject: Right ankle     Right ankle is feeling better, swelling went down and walking good with no pain  
LVM for pt. Prescription pended. Questioning if pt wants prescription sent to [facility] in [place].   
Ok, restart allopurinol 100mg po qday  #30  3 refills  
Patient returned call and stated that he would like the prescription to be sent into the [facility] in [place].  
RX sent.   
extension views. Oblique images  demonstrate appropriate overlap of the articular facets. There is osseous  narrowing of the left neural foramen at C5-C6.    LUMBAR SPINE 5 VIEWS: The lumbar vertebrae are normal in height and alignment.  There are no pars defects. Facet degenerative change is present at L5-S1.  Degenerative disc disease is present with osteophyte formation anteriorly at  L2-L3 and L3-L4.    Impression  IMPRESSION:    1. Degenerative disc disease with left foraminal stenosis at C5-C6.    2. Mild lumbar degenerative disc disease at L2-L3 and L3-L4.      Results from Office Visit encounter on 09/28/16    XR CHEST PA LAT      CT Results (maximum last 3):  Results from Hospital Encounter encounter on 12/27/17    CT MAXILLOFACIAL WO CONT    Narrative  CT sinuses    INDICATION: Chronic sinusitis, presurgical planning    Multiple high resolution axial images were obtained through the paranasal  sinuses. Coronal reformats were also evaluated.  Radiation dose reduction  techniques were used for this study:  All CT scans performed at this facility  use one or all of the following: Automated exposure control, adjustment of the  mA and/or kVp according to patient's size, iterative reconstruction.    FINDINGS: The sinuses are clear. No masses or fluid collections are seen. There  is no significant mucoperiosteal thickening. The maxillary ostia are widely  patent. There are no bony lesions. There is no significant septal deviation.    Impression  IMPRESSION: Unremarkable CT of the sinuses      Results from Abstract encounter on 11/08/17    CT HEAD W CONT      Results from Abstract encounter on 10/25/17    CT SINUSES WO CONT      MRI Results (maximum last 3):  Results from Abstract encounter on 12/23/21    MRI BRAIN W WO CONT      MRI CERV SPINE W WO CONT      Nuclear Medicine Results (maximum last 3):  No results found for this or any previous visit.      US Results (maximum last 3):      DEXA Results (maximum

## 2024-04-08 ENCOUNTER — TELEPHONE (OUTPATIENT)
Dept: PRIMARY CARE CLINIC | Facility: CLINIC | Age: 64
End: 2024-04-08

## 2024-04-08 NOTE — TELEPHONE ENCOUNTER
----- Message from Kaykay Murphy MD sent at 4/4/2024  8:02 PM EDT -----  Vit b12 -high-stop supplements if any  Blood sugar a tad high--follow better low carb /seet diet  All other labs stable

## 2024-08-23 ENCOUNTER — OFFICE VISIT (OUTPATIENT)
Dept: PRIMARY CARE CLINIC | Facility: CLINIC | Age: 64
End: 2024-08-23
Payer: MEDICARE

## 2024-08-23 VITALS
HEIGHT: 67 IN | DIASTOLIC BLOOD PRESSURE: 60 MMHG | BODY MASS INDEX: 21.66 KG/M2 | OXYGEN SATURATION: 96 % | TEMPERATURE: 97.1 F | HEART RATE: 54 BPM | SYSTOLIC BLOOD PRESSURE: 100 MMHG | WEIGHT: 138 LBS

## 2024-08-23 DIAGNOSIS — G47.00 INSOMNIA, UNSPECIFIED TYPE: ICD-10-CM

## 2024-08-23 DIAGNOSIS — R53.82 CHRONIC FATIGUE, UNSPECIFIED: ICD-10-CM

## 2024-08-23 DIAGNOSIS — E55.9 VITAMIN D DEFICIENCY, UNSPECIFIED: ICD-10-CM

## 2024-08-23 DIAGNOSIS — K21.9 GASTROESOPHAGEAL REFLUX DISEASE WITHOUT ESOPHAGITIS: ICD-10-CM

## 2024-08-23 DIAGNOSIS — Z86.19 HX OF LYME DISEASE: ICD-10-CM

## 2024-08-23 DIAGNOSIS — G57.93 NEUROPATHIC PAIN OF BOTH LEGS: ICD-10-CM

## 2024-08-23 DIAGNOSIS — M85.80 OSTEOPENIA, UNSPECIFIED LOCATION: ICD-10-CM

## 2024-08-23 DIAGNOSIS — E78.5 HYPERLIPIDEMIA, UNSPECIFIED HYPERLIPIDEMIA TYPE: Primary | ICD-10-CM

## 2024-08-23 DIAGNOSIS — Z91.09 ENVIRONMENTAL ALLERGIES: ICD-10-CM

## 2024-08-23 PROCEDURE — 99214 OFFICE O/P EST MOD 30 MIN: CPT | Performed by: FAMILY MEDICINE

## 2024-08-23 RX ORDER — MIRTAZAPINE 15 MG/1
7.5 TABLET, FILM COATED ORAL NIGHTLY
Qty: 90 TABLET | Refills: 0 | Status: SHIPPED | OUTPATIENT
Start: 2024-08-23

## 2024-08-23 NOTE — PROGRESS NOTES
[facility]  [address]  [place] [address]  Office : [phone]  Fax : [phone]      Subjective:  The patient is a 61 y.o. female  who presents for f/u on multiple chronic medical conditions-good compliance with medications -pt here to get routeine labs and need refill on meds.no cardiopulmonary symptoms  CHRONIC FATIGUE from hx of lymes disease--sees specialist in [facility] twice a year-on pulse abx and several supplements-not able to in 2024--pt trying to find some one who manages chronic lymes disease-seeing specialist in [facility] in 8/2024  Pt on neurontin for neuropathy feet/central pain  Pt on singulair for environmental allergies  Pt on rementon for insomnia and central pain-entire body pain  Pt uses benzo as needed for anxiety  Pt take antibiotics -pulse therapy  Hyprlipidemia-stable on diet  Insomnia-on meds as needed  Osteopenia--on vit d /ca supplement  Left tennis elbow--sees ortho  Palpitations-sees cardiologist-so far work up neg  Gvtcvmpembl-exn-ebe appt with spine clinic soon-ddd also causing chronic headaches  Right sided headache-on valtrex now-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  Chronic left hip pain/left sciatica--arthritis vs sprain-doing PT-helps-interested in xray      Patient Active Problem List   Diagnosis Code    Lyme disease A69.20    Glaucoma H40.9    PLMD (periodic limb movement disorder) G47.61    Daytime sleepiness R40.0    S/P sinus surgery Z98.890    Osteoarthritis of lumbar spine M47.816    Osteopenia M85.80    Multiple thyroid nodules E04.2    Chronic fatigue R53.82    Medicare annual wellness visit, subsequent Z00.00    Palpitations R00.2    Tachycardia R00.0    Primary insomnia F51.01    Left tennis elbow M77.12    Myalgia M79.10    Burning with urination R30.0    Urinary tract infection without hematuria N39.0    Vitamin D deficiency E55.9    Hx of sinus surgery Z98.890    Pharyngitis J02.9    Sinusitis J32.9    Osteopenia determined by

## 2024-11-19 ENCOUNTER — OFFICE VISIT (OUTPATIENT)
Dept: PRIMARY CARE CLINIC | Facility: CLINIC | Age: 64
End: 2024-11-19
Payer: COMMERCIAL

## 2024-11-19 VITALS
TEMPERATURE: 98.2 F | HEIGHT: 67 IN | WEIGHT: 141 LBS | OXYGEN SATURATION: 98 % | BODY MASS INDEX: 22.13 KG/M2 | RESPIRATION RATE: 17 BRPM | HEART RATE: 62 BPM | DIASTOLIC BLOOD PRESSURE: 70 MMHG | SYSTOLIC BLOOD PRESSURE: 110 MMHG

## 2024-11-19 DIAGNOSIS — G47.00 INSOMNIA, UNSPECIFIED TYPE: ICD-10-CM

## 2024-11-19 DIAGNOSIS — Z86.19 HX OF LYME DISEASE: ICD-10-CM

## 2024-11-19 DIAGNOSIS — R53.82 CHRONIC FATIGUE, UNSPECIFIED: ICD-10-CM

## 2024-11-19 DIAGNOSIS — G57.93 NEUROPATHIC PAIN OF BOTH LEGS: ICD-10-CM

## 2024-11-19 DIAGNOSIS — M85.80 OSTEOPENIA, UNSPECIFIED LOCATION: ICD-10-CM

## 2024-11-19 DIAGNOSIS — K21.9 GASTROESOPHAGEAL REFLUX DISEASE WITHOUT ESOPHAGITIS: ICD-10-CM

## 2024-11-19 DIAGNOSIS — R30.0 BURNING WITH URINATION: ICD-10-CM

## 2024-11-19 DIAGNOSIS — E78.5 HYPERLIPIDEMIA, UNSPECIFIED HYPERLIPIDEMIA TYPE: ICD-10-CM

## 2024-11-19 DIAGNOSIS — N30.01 ACUTE CYSTITIS WITH HEMATURIA: Primary | ICD-10-CM

## 2024-11-19 DIAGNOSIS — Z91.09 ENVIRONMENTAL ALLERGIES: ICD-10-CM

## 2024-11-19 DIAGNOSIS — E55.9 VITAMIN D DEFICIENCY, UNSPECIFIED: ICD-10-CM

## 2024-11-19 LAB
BILIRUBIN, URINE, POC: NEGATIVE
BLOOD URINE, POC: ABNORMAL
GLUCOSE URINE, POC: NEGATIVE MG/DL
KETONES, URINE, POC: NEGATIVE MG/DL
LEUKOCYTE ESTERASE, URINE, POC: ABNORMAL
NITRITE, URINE, POC: NEGATIVE
PH, URINE, POC: 7 (ref 4.6–8)
PROTEIN,URINE, POC: ABNORMAL MG/DL
SPECIFIC GRAVITY, URINE, POC: 1.01 (ref 1–1.03)
URINALYSIS CLARITY, POC: CLEAR
URINALYSIS COLOR, POC: YELLOW
UROBILINOGEN, POC: ABNORMAL MG/DL

## 2024-11-19 PROCEDURE — G8420 CALC BMI NORM PARAMETERS: HCPCS | Performed by: FAMILY MEDICINE

## 2024-11-19 PROCEDURE — 1123F ACP DISCUSS/DSCN MKR DOCD: CPT | Performed by: FAMILY MEDICINE

## 2024-11-19 PROCEDURE — 1090F PRES/ABSN URINE INCON ASSESS: CPT | Performed by: FAMILY MEDICINE

## 2024-11-19 PROCEDURE — 99214 OFFICE O/P EST MOD 30 MIN: CPT | Performed by: FAMILY MEDICINE

## 2024-11-19 PROCEDURE — G8484 FLU IMMUNIZE NO ADMIN: HCPCS | Performed by: FAMILY MEDICINE

## 2024-11-19 PROCEDURE — G8399 PT W/DXA RESULTS DOCUMENT: HCPCS | Performed by: FAMILY MEDICINE

## 2024-11-19 PROCEDURE — G8427 DOCREV CUR MEDS BY ELIG CLIN: HCPCS | Performed by: FAMILY MEDICINE

## 2024-11-19 PROCEDURE — 81003 URINALYSIS AUTO W/O SCOPE: CPT | Performed by: FAMILY MEDICINE

## 2024-11-19 PROCEDURE — 3017F COLORECTAL CA SCREEN DOC REV: CPT | Performed by: FAMILY MEDICINE

## 2024-11-19 PROCEDURE — 1036F TOBACCO NON-USER: CPT | Performed by: FAMILY MEDICINE

## 2024-11-19 RX ORDER — MIRTAZAPINE 15 MG/1
7.5 TABLET, FILM COATED ORAL NIGHTLY
Qty: 90 TABLET | Refills: 0 | Status: SHIPPED | OUTPATIENT
Start: 2024-11-19

## 2024-11-19 RX ORDER — GABAPENTIN 100 MG/1
100 CAPSULE ORAL 3 TIMES DAILY
Qty: 270 CAPSULE | Refills: 0 | Status: SHIPPED | OUTPATIENT
Start: 2024-11-19 | End: 2025-02-17

## 2024-11-19 RX ORDER — CIPROFLOXACIN 500 MG/1
500 TABLET, FILM COATED ORAL 2 TIMES DAILY
Qty: 14 TABLET | Refills: 0 | Status: SHIPPED | OUTPATIENT
Start: 2024-11-19 | End: 2024-11-26

## 2024-11-20 NOTE — PROGRESS NOTES
[facility]  [address]  [place] [address]  Office : [phone]  Fax : [phone]      Subjective:  The patient is a 61 y.o. female  who presents for f/u on 3 days hx of urinary frequency and dysuria  Pt denies abdominal pain/N/V/fever or chills  No abdominal or flank pain  No blood in urine    multiple chronic medical conditions-good compliance with medications -pt here to get routeine labs and need refill on meds.no cardiopulmonary symptoms  Hyprlipidemia-stable on diet  Vitamin D deficiency-on supplement  CHRONIC FATIGUE from hx of lymes disease--sees specialist in [facility] twice a year-on pulse abx and several supplements  Pt takes monthly abx for a week and off for a month  Insomnia-on meds as needed  Osteopenia--on vit d /ca supplement  Left tennis elbow--sees ortho  Palpitations-sees cardiologist-so far work up neg  Ttlomzbgdtv-dot-rie appt with spine clinic soon-ddd also causing chronic headaches  Right sided headache-on valtrex now-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  Chronic left hip pain/arthritis vs sprain-doing PT-helps      Patient Active Problem List   Diagnosis Code    Lyme disease A69.20    Glaucoma H40.9    PLMD (periodic limb movement disorder) G47.61    Daytime sleepiness R40.0    S/P sinus surgery Z98.890    Osteoarthritis of lumbar spine M47.816    Osteopenia M85.80    Multiple thyroid nodules E04.2    Chronic fatigue R53.82    Medicare annual wellness visit, subsequent Z00.00    Palpitations R00.2    Tachycardia R00.0    Primary insomnia F51.01    Left tennis elbow M77.12    Myalgia M79.10    Burning with urination R30.0    Urinary tract infection without hematuria N39.0    Vitamin D deficiency E55.9    Hx of sinus surgery Z98.890    Pharyngitis J02.9    Sinusitis J32.9    Osteopenia determined by x-ray M85.80    Recurrent sinus infections J32.9    Suspected sleep apnea R29.818    Bradycardia R00.1    Allergic rhinitis due to allergen J30.9    Sleep-related

## 2024-11-21 LAB
BACTERIA SPEC CULT: NORMAL
HBA1C MFR BLD: 6.2 % (ref 4.8–5.6)
SERVICE CMNT-IMP: NORMAL

## 2024-12-18 ENCOUNTER — OFFICE VISIT (OUTPATIENT)
Dept: PRIMARY CARE CLINIC | Facility: CLINIC | Age: 64
End: 2024-12-18
Payer: MEDICARE

## 2024-12-18 VITALS
OXYGEN SATURATION: 96 % | RESPIRATION RATE: 17 BRPM | DIASTOLIC BLOOD PRESSURE: 56 MMHG | WEIGHT: 139 LBS | TEMPERATURE: 98.5 F | SYSTOLIC BLOOD PRESSURE: 117 MMHG | HEIGHT: 67 IN | HEART RATE: 63 BPM | BODY MASS INDEX: 21.82 KG/M2

## 2024-12-18 DIAGNOSIS — R53.82 CHRONIC FATIGUE, UNSPECIFIED: ICD-10-CM

## 2024-12-18 DIAGNOSIS — J32.9 SINUSITIS, UNSPECIFIED CHRONICITY, UNSPECIFIED LOCATION: Primary | ICD-10-CM

## 2024-12-18 DIAGNOSIS — Z71.3 DIETARY COUNSELING: ICD-10-CM

## 2024-12-18 DIAGNOSIS — J40 BRONCHITIS: ICD-10-CM

## 2024-12-18 DIAGNOSIS — E78.5 HYPERLIPIDEMIA, UNSPECIFIED HYPERLIPIDEMIA TYPE: ICD-10-CM

## 2024-12-18 DIAGNOSIS — K21.9 GASTROESOPHAGEAL REFLUX DISEASE WITHOUT ESOPHAGITIS: ICD-10-CM

## 2024-12-18 DIAGNOSIS — J04.0 LARYNGITIS: ICD-10-CM

## 2024-12-18 DIAGNOSIS — M85.80 OSTEOPENIA, UNSPECIFIED LOCATION: ICD-10-CM

## 2024-12-18 DIAGNOSIS — Z86.19 HX OF LYME DISEASE: ICD-10-CM

## 2024-12-18 DIAGNOSIS — Z91.09 ENVIRONMENTAL ALLERGIES: ICD-10-CM

## 2024-12-18 PROCEDURE — G8420 CALC BMI NORM PARAMETERS: HCPCS | Performed by: FAMILY MEDICINE

## 2024-12-18 PROCEDURE — G8484 FLU IMMUNIZE NO ADMIN: HCPCS | Performed by: FAMILY MEDICINE

## 2024-12-18 PROCEDURE — 1090F PRES/ABSN URINE INCON ASSESS: CPT | Performed by: FAMILY MEDICINE

## 2024-12-18 PROCEDURE — 3017F COLORECTAL CA SCREEN DOC REV: CPT | Performed by: FAMILY MEDICINE

## 2024-12-18 PROCEDURE — 99214 OFFICE O/P EST MOD 30 MIN: CPT | Performed by: FAMILY MEDICINE

## 2024-12-18 PROCEDURE — G8399 PT W/DXA RESULTS DOCUMENT: HCPCS | Performed by: FAMILY MEDICINE

## 2024-12-18 PROCEDURE — 1123F ACP DISCUSS/DSCN MKR DOCD: CPT | Performed by: FAMILY MEDICINE

## 2024-12-18 PROCEDURE — G8427 DOCREV CUR MEDS BY ELIG CLIN: HCPCS | Performed by: FAMILY MEDICINE

## 2024-12-18 PROCEDURE — 1036F TOBACCO NON-USER: CPT | Performed by: FAMILY MEDICINE

## 2024-12-18 NOTE — PROGRESS NOTES
[facility]  [address]  [place] [address]  Office : [phone]  Fax : [phone]           Subjective:  The patient is a 60 y.o. female  who presents for f/u on  productive cough,sorethroat,loss of voice ,earache,facial pain .conjunctivitis-nasolacrimal drainage and chest/sinus congestion for few days-worse last 2 days--no fever/cp/sob/wheezing/rash/abd symptoms-pt tried some OTC meds without relief.  Pt was seen at an urgent care 2 weeks ago-covid  and flu neg  Pt has persistent symptoms      multiple chronic medical conditions-good compliance with medications -pt here to get routeine labs and need refill on meds.no cardiopulmonary symptoms  CHRONIC FATIGUE from lymes disease--sees specialist in [facility] twice a year-on pulse abx and several supplements  lymes disease-pulse abx form ID   Myalgia-pain is annoying/nagging 4-5/10 intensity-worse on and off  Insomnia-on meds as needed  Osteopenia--on vit d /ca supplement  Palpitations-sees cardiologist-so far work up neg-lately more bradycardiac--no cardiopul symptoms    Pt cannot take vaccinations as per her specialist suggestion        Patient Active Problem List   Diagnosis Code    Lyme disease A69.20    Glaucoma H40.9    PLMD (periodic limb movement disorder) G47.61    Daytime sleepiness R40.0    S/P sinus surgery Z98.890    Osteoarthritis of lumbar spine M47.816    Osteopenia M85.80    Multiple thyroid nodules E04.2    Chronic fatigue R53.82    Medicare annual wellness visit, subsequent Z00.00    Palpitations R00.2    Tachycardia R00.0    Primary insomnia F51.01    Left tennis elbow M77.12    Myalgia M79.10    Burning with urination R30.0    Urinary tract infection without hematuria N39.0    Vitamin D deficiency E55.9    Hx of sinus surgery Z98.890    Pharyngitis J02.9    Sinusitis J32.9    Osteopenia determined by x-ray M85.80    Recurrent sinus infections J32.9    Suspected sleep apnea R29.818    Bradycardia R00.1    
dated:  9/29/2023 mammogram, 9/27/2022 mammogram, and 8/12/2021 mammogram - [facility].  The breasts are heterogeneously dense, which may obscure small masses.  Digital mammography views were performed including tomosynthesis.     No significant masses, calcifications, or other findings are seen in either breast.  There has been no significant interval change.     IMPRESSION: NEGATIVE  There is no mammographic evidence of malignancy. A 1 year screening mammogram is recommended. A reminder letter will be scheduled for the patient.     Based on the NCI/NSABP BCRA tool, this patient's calculated 5-year risk for developing breast cancer is 1.8%. This patient meets the criteria for a referral to the Breast Cancer prevention Clinic.     Referral to a high risk clinic (such as the [facility] Prevention Clinic) can be made by calling [phone] or by placing an order in [email or web address] (Ambulatory referral to: [facility], Breast Cancer Prevention Clinic).     If the patient has already been referred to the Breast Center Prevention Clinic, a new referral may not be needed.     This calculation is based upon the information that you provided to the technologist at the time of the exam. The risk assessment may vary each year.        Electronically Signed By: Bernardo wong/yazmin:10/14/2024 12:04:39        letter sent: Normal  Mammogram BI-RADS: Category 1: Negative        We discussed the expected course, resolution and complications of the diagnosis(es) in detail.  Medication risks, benefits, costs, interactions, and alternatives were discussed as indicated.  I advised her to contact the office if her condition worsens, changes or fails to improve as anticipated. She expressed understanding with the diagnosis(es) and plan.   Over 50% of today's office visit was spent in face to face time in counseling   Diet/exercises ,sleep hygene,vit d supplement ,low fat diet ,low

## 2025-02-14 ENCOUNTER — OFFICE VISIT (OUTPATIENT)
Dept: PRIMARY CARE CLINIC | Facility: CLINIC | Age: 65
End: 2025-02-14
Payer: MEDICARE

## 2025-02-14 VITALS
OXYGEN SATURATION: 98 % | SYSTOLIC BLOOD PRESSURE: 118 MMHG | HEIGHT: 67 IN | TEMPERATURE: 98.4 F | BODY MASS INDEX: 21.97 KG/M2 | WEIGHT: 140 LBS | DIASTOLIC BLOOD PRESSURE: 60 MMHG | HEART RATE: 54 BPM | RESPIRATION RATE: 17 BRPM

## 2025-02-14 DIAGNOSIS — R53.82 CHRONIC FATIGUE, UNSPECIFIED: ICD-10-CM

## 2025-02-14 DIAGNOSIS — K21.9 GASTROESOPHAGEAL REFLUX DISEASE WITHOUT ESOPHAGITIS: ICD-10-CM

## 2025-02-14 DIAGNOSIS — Z71.3 DIETARY COUNSELING: ICD-10-CM

## 2025-02-14 DIAGNOSIS — E78.5 HYPERLIPIDEMIA, UNSPECIFIED HYPERLIPIDEMIA TYPE: ICD-10-CM

## 2025-02-14 DIAGNOSIS — Z86.19 HX OF LYME DISEASE: ICD-10-CM

## 2025-02-14 DIAGNOSIS — N39.0 URINARY TRACT INFECTION WITHOUT HEMATURIA, SITE UNSPECIFIED: Primary | ICD-10-CM

## 2025-02-14 DIAGNOSIS — R35.0 FREQUENT URINATION: ICD-10-CM

## 2025-02-14 DIAGNOSIS — Z91.09 ENVIRONMENTAL ALLERGIES: ICD-10-CM

## 2025-02-14 LAB
BILIRUBIN, URINE, POC: NEGATIVE
BLOOD URINE, POC: NORMAL
GLUCOSE URINE, POC: NEGATIVE MG/DL
KETONES, URINE, POC: NEGATIVE MG/DL
LEUKOCYTE ESTERASE, URINE, POC: NORMAL
NITRITE, URINE, POC: NEGATIVE
PH, URINE, POC: 8 (ref 4.6–8)
PROTEIN,URINE, POC: NEGATIVE MG/DL
SPECIFIC GRAVITY, URINE, POC: 1.01 (ref 1–1.03)
URINALYSIS CLARITY, POC: CLEAR
URINALYSIS COLOR, POC: YELLOW
UROBILINOGEN, POC: NORMAL MG/DL

## 2025-02-14 PROCEDURE — G8420 CALC BMI NORM PARAMETERS: HCPCS | Performed by: FAMILY MEDICINE

## 2025-02-14 PROCEDURE — 3017F COLORECTAL CA SCREEN DOC REV: CPT | Performed by: FAMILY MEDICINE

## 2025-02-14 PROCEDURE — 81003 URINALYSIS AUTO W/O SCOPE: CPT | Performed by: FAMILY MEDICINE

## 2025-02-14 PROCEDURE — 1036F TOBACCO NON-USER: CPT | Performed by: FAMILY MEDICINE

## 2025-02-14 PROCEDURE — G8427 DOCREV CUR MEDS BY ELIG CLIN: HCPCS | Performed by: FAMILY MEDICINE

## 2025-02-14 PROCEDURE — 1090F PRES/ABSN URINE INCON ASSESS: CPT | Performed by: FAMILY MEDICINE

## 2025-02-14 PROCEDURE — 1123F ACP DISCUSS/DSCN MKR DOCD: CPT | Performed by: FAMILY MEDICINE

## 2025-02-14 PROCEDURE — G8399 PT W/DXA RESULTS DOCUMENT: HCPCS | Performed by: FAMILY MEDICINE

## 2025-02-14 PROCEDURE — 99214 OFFICE O/P EST MOD 30 MIN: CPT | Performed by: FAMILY MEDICINE

## 2025-02-14 RX ORDER — CIPROFLOXACIN 500 MG/1
500 TABLET, FILM COATED ORAL 2 TIMES DAILY
Qty: 14 TABLET | Refills: 0 | Status: SHIPPED | OUTPATIENT
Start: 2025-02-14 | End: 2025-02-21

## 2025-02-14 SDOH — ECONOMIC STABILITY: FOOD INSECURITY: WITHIN THE PAST 12 MONTHS, YOU WORRIED THAT YOUR FOOD WOULD RUN OUT BEFORE YOU GOT MONEY TO BUY MORE.: NEVER TRUE

## 2025-02-14 SDOH — ECONOMIC STABILITY: FOOD INSECURITY: WITHIN THE PAST 12 MONTHS, THE FOOD YOU BOUGHT JUST DIDN'T LAST AND YOU DIDN'T HAVE MONEY TO GET MORE.: NEVER TRUE

## 2025-02-14 ASSESSMENT — PATIENT HEALTH QUESTIONNAIRE - PHQ9
SUM OF ALL RESPONSES TO PHQ9 QUESTIONS 1 & 2: 0
SUM OF ALL RESPONSES TO PHQ QUESTIONS 1-9: 0
1. LITTLE INTEREST OR PLEASURE IN DOING THINGS: NOT AT ALL
2. FEELING DOWN, DEPRESSED OR HOPELESS: NOT AT ALL

## 2025-02-15 NOTE — PROGRESS NOTES
deviation.    Impression  IMPRESSION: Unremarkable CT of the sinuses      Results from Abstract encounter on 11/08/17    CT HEAD W CONT      Results from Abstract encounter on 10/25/17    CT SINUSES WO CONT      MRI Results (maximum last 3):  Results from Abstract encounter on 12/23/21    MRI BRAIN W WO CONT      MRI CERV SPINE W WO CONT      Nuclear Medicine Results (maximum last 3):  No results found for this or any previous visit.      US Results (maximum last 3):      DEXA Results (maximum last 3):      LETICIA Results (maximum last 3):  Results from Abstract encounter on 11/09/15    LETICIA MAMMOGRAM SCREEN BILAT      IR Results (maximum last 3):  No results found for this or any previous visit.      VAS/US Results (maximum last 3):  No results found for this or any previous visit.      PET Results (maximum last 3):  No results found for this or any previous visit.        We discussed the expected course, resolution and complications of the diagnosis(es) in detail.  Medication risks, benefits, costs, interactions, and alternatives were discussed as indicated.  I advised her to contact the office if her condition worsens, changes or fails to improve as anticipated. She expressed understanding with the diagnosis(es) and plan.     Follow-up and Dispositions    Return in about 6 months or as needed         Kaykay Murphy MD

## 2025-02-23 SDOH — HEALTH STABILITY: PHYSICAL HEALTH: ON AVERAGE, HOW MANY DAYS PER WEEK DO YOU ENGAGE IN MODERATE TO STRENUOUS EXERCISE (LIKE A BRISK WALK)?: 5 DAYS

## 2025-02-23 SDOH — HEALTH STABILITY: PHYSICAL HEALTH: ON AVERAGE, HOW MANY MINUTES DO YOU ENGAGE IN EXERCISE AT THIS LEVEL?: 40 MIN

## 2025-02-23 ASSESSMENT — PATIENT HEALTH QUESTIONNAIRE - PHQ9
SUM OF ALL RESPONSES TO PHQ QUESTIONS 1-9: 0
SUM OF ALL RESPONSES TO PHQ QUESTIONS 1-9: 0
1. LITTLE INTEREST OR PLEASURE IN DOING THINGS: NOT AT ALL
SUM OF ALL RESPONSES TO PHQ QUESTIONS 1-9: 0
2. FEELING DOWN, DEPRESSED OR HOPELESS: NOT AT ALL
SUM OF ALL RESPONSES TO PHQ QUESTIONS 1-9: 0
SUM OF ALL RESPONSES TO PHQ9 QUESTIONS 1 & 2: 0

## 2025-02-23 ASSESSMENT — LIFESTYLE VARIABLES
HOW MANY STANDARD DRINKS CONTAINING ALCOHOL DO YOU HAVE ON A TYPICAL DAY: 0
HOW OFTEN DO YOU HAVE SIX OR MORE DRINKS ON ONE OCCASION: 1
HOW OFTEN DO YOU HAVE A DRINK CONTAINING ALCOHOL: NEVER
HOW OFTEN DO YOU HAVE A DRINK CONTAINING ALCOHOL: 1
HOW MANY STANDARD DRINKS CONTAINING ALCOHOL DO YOU HAVE ON A TYPICAL DAY: PATIENT DOES NOT DRINK

## 2025-02-24 ENCOUNTER — OFFICE VISIT (OUTPATIENT)
Dept: PRIMARY CARE CLINIC | Facility: CLINIC | Age: 65
End: 2025-02-24
Payer: MEDICARE

## 2025-02-24 VITALS
HEIGHT: 67 IN | SYSTOLIC BLOOD PRESSURE: 126 MMHG | OXYGEN SATURATION: 97 % | HEART RATE: 51 BPM | DIASTOLIC BLOOD PRESSURE: 61 MMHG | RESPIRATION RATE: 17 BRPM | WEIGHT: 139 LBS | TEMPERATURE: 98.4 F | BODY MASS INDEX: 21.82 KG/M2

## 2025-02-24 DIAGNOSIS — G57.93 NEUROPATHIC PAIN OF BOTH LEGS: ICD-10-CM

## 2025-02-24 DIAGNOSIS — K21.9 GASTROESOPHAGEAL REFLUX DISEASE WITHOUT ESOPHAGITIS: ICD-10-CM

## 2025-02-24 DIAGNOSIS — G47.00 INSOMNIA, UNSPECIFIED TYPE: ICD-10-CM

## 2025-02-24 DIAGNOSIS — R73.03 PREDIABETES: ICD-10-CM

## 2025-02-24 DIAGNOSIS — Z86.19 HX OF LYME DISEASE: ICD-10-CM

## 2025-02-24 DIAGNOSIS — Z00.00 MEDICARE ANNUAL WELLNESS VISIT, SUBSEQUENT: Primary | ICD-10-CM

## 2025-02-24 DIAGNOSIS — R30.0 DYSURIA: ICD-10-CM

## 2025-02-24 DIAGNOSIS — Z91.09 ENVIRONMENTAL ALLERGIES: ICD-10-CM

## 2025-02-24 DIAGNOSIS — R53.82 CHRONIC FATIGUE, UNSPECIFIED: ICD-10-CM

## 2025-02-24 DIAGNOSIS — Z71.3 DIETARY COUNSELING: ICD-10-CM

## 2025-02-24 DIAGNOSIS — E78.5 HYPERLIPIDEMIA, UNSPECIFIED HYPERLIPIDEMIA TYPE: ICD-10-CM

## 2025-02-24 LAB
BILIRUBIN, URINE, POC: NEGATIVE
BLOOD URINE, POC: NEGATIVE
GLUCOSE URINE, POC: NEGATIVE MG/DL
KETONES, URINE, POC: NEGATIVE MG/DL
LEUKOCYTE ESTERASE, URINE, POC: NEGATIVE
NITRITE, URINE, POC: NEGATIVE
PH, URINE, POC: 6 (ref 4.6–8)
PROTEIN,URINE, POC: NEGATIVE MG/DL
SPECIFIC GRAVITY, URINE, POC: 1.02 (ref 1–1.03)
URINALYSIS CLARITY, POC: CLEAR
URINALYSIS COLOR, POC: YELLOW
UROBILINOGEN, POC: NORMAL MG/DL

## 2025-02-24 PROCEDURE — G8420 CALC BMI NORM PARAMETERS: HCPCS | Performed by: FAMILY MEDICINE

## 2025-02-24 PROCEDURE — 1123F ACP DISCUSS/DSCN MKR DOCD: CPT | Performed by: FAMILY MEDICINE

## 2025-02-24 PROCEDURE — 1090F PRES/ABSN URINE INCON ASSESS: CPT | Performed by: FAMILY MEDICINE

## 2025-02-24 PROCEDURE — 99214 OFFICE O/P EST MOD 30 MIN: CPT | Performed by: FAMILY MEDICINE

## 2025-02-24 PROCEDURE — G8427 DOCREV CUR MEDS BY ELIG CLIN: HCPCS | Performed by: FAMILY MEDICINE

## 2025-02-24 PROCEDURE — 3017F COLORECTAL CA SCREEN DOC REV: CPT | Performed by: FAMILY MEDICINE

## 2025-02-24 PROCEDURE — G8399 PT W/DXA RESULTS DOCUMENT: HCPCS | Performed by: FAMILY MEDICINE

## 2025-02-24 PROCEDURE — 1036F TOBACCO NON-USER: CPT | Performed by: FAMILY MEDICINE

## 2025-02-24 PROCEDURE — G0439 PPPS, SUBSEQ VISIT: HCPCS | Performed by: FAMILY MEDICINE

## 2025-02-24 PROCEDURE — 81003 URINALYSIS AUTO W/O SCOPE: CPT | Performed by: FAMILY MEDICINE

## 2025-02-24 RX ORDER — MIRTAZAPINE 15 MG/1
7.5 TABLET, FILM COATED ORAL NIGHTLY
Qty: 90 TABLET | Refills: 0 | Status: SHIPPED | OUTPATIENT
Start: 2025-02-24

## 2025-02-24 RX ORDER — GABAPENTIN 100 MG/1
100 CAPSULE ORAL 3 TIMES DAILY
Qty: 270 CAPSULE | Refills: 0 | Status: SHIPPED | OUTPATIENT
Start: 2025-02-24 | End: 2025-05-25

## 2025-02-24 RX ORDER — MONTELUKAST SODIUM 10 MG/1
10 TABLET ORAL DAILY
Qty: 90 TABLET | Refills: 0 | Status: SHIPPED | OUTPATIENT
Start: 2025-02-24

## 2025-02-24 NOTE — PROGRESS NOTES
Sig: Take 1 capsule by mouth 3 times daily for 90 days.     Dispense:  270 capsule     Refill:  0    mirtazapine (REMERON) 15 MG tablet     Sig: Take 0.5 tablets by mouth nightly     Dispense:  90 tablet     Refill:  0    montelukast (SINGULAIR) 10 MG tablet     Sig: Take 1 tablet by mouth daily     Dispense:  90 tablet     Refill:  0        Results for orders placed or performed in visit on 09/06/21    MAMMOGRAPHY   Result Value Ref Range    Mammography, External         XR Results (maximum last 3):  Results from Hospital Encounter encounter on 12/13/16    XR SPINE LUMB MIN 4 V    Narrative  History: Low back pain without injury. Degenerative disc disease.  Pain.    CERVICAL SPINE 8 VIEWS: The cervical vertebrae are normal in height and  alignment. Degenerative disc disease is present at C5-C6. There is no  significant subluxation on flexion or extension views. Oblique images  demonstrate appropriate overlap of the articular facets. There is osseous  narrowing of the left neural foramen at C5-C6.    LUMBAR SPINE 5 VIEWS: The lumbar vertebrae are normal in height and alignment.  There are no pars defects. Facet degenerative change is present at L5-S1.  Degenerative disc disease is present with osteophyte formation anteriorly at  L2-L3 and L3-L4.    Impression  IMPRESSION:    1. Degenerative disc disease with left foraminal stenosis at C5-C6.    2. Mild lumbar degenerative disc disease at L2-L3 and L3-L4.      XR SPINE CERV W OBL/FLEX/EXT MIN 6 V COMP    Narrative  History: Low back pain without injury. Degenerative disc disease.  Pain.    CERVICAL SPINE 8 VIEWS: The cervical vertebrae are normal in height and  alignment. Degenerative disc disease is present at C5-C6. There is no  significant subluxation on flexion or extension views. Oblique images  demonstrate appropriate overlap of the articular facets. There is osseous  narrowing of the left neural foramen at C5-C6.    LUMBAR SPINE 5 VIEWS: The lumbar

## 2025-03-11 ENCOUNTER — HOSPITAL ENCOUNTER (OUTPATIENT)
Dept: PHYSICAL THERAPY | Age: 65
Setting detail: RECURRING SERIES
Discharge: HOME | End: 2025-03-14
Payer: MEDICARE

## 2025-03-11 DIAGNOSIS — M54.6 PAIN IN THORACIC SPINE: ICD-10-CM

## 2025-03-11 DIAGNOSIS — M54.59 OTHER LOW BACK PAIN: Primary | ICD-10-CM

## 2025-03-11 PROCEDURE — 97162 PT EVAL MOD COMPLEX 30 MIN: CPT

## 2025-03-11 ASSESSMENT — PAIN SCALES - GENERAL: PAINLEVEL_OUTOF10: 6

## 2025-03-11 NOTE — PROGRESS NOTES
Natasha Chappell  : 1960  Primary: Medicare Part A And B (Medicare)  Secondary: [employer] HE[employer] C[employer] MEDICARE SUPP [facility] [place] [facility] [address]  Phone: [phone]  Fax: [phone] Plan Frequency: 1-2 times a week for 12 weeks  Plan of Care/Certification Expiration Date: 25        Plan of Care/Certification Expiration Date:  Plan of Care/Certification Expiration Date: 25    Frequency/Duration: Plan Frequency: 1-2 times a week for 12 weeks      Time In/Out:   Time In: 0900  Time Out: 1000      PT Visit Info:         Visit Count:  1    OUTPATIENT PHYSICAL THERAPY:   Treatment Note 3/11/2025       Episode  (low back pain/thoracic spine pain)               Treatment Diagnosis:    Other low back pain  Pain in thoracic spine  Medical/Referring Diagnosis:    Low back pain  Midline thoracic back pain  Strain of muscle and tendon of back wall of thorax, initial encounter      Referring Physician:  Kiera Carrillo PA-C MD Orders:  PT Eval and Treat   Return MD Appt:  May   Date of Onset:  Onset Date: 25     Allergies:   Latex and Dexamethasone  Restrictions/Precautions:   None      Interventions Planned (Treatment may consist of any combination of the following):  Current Treatment Recommendations: Strengthening; ROM; Balance training; Functional mobility training; Transfer training; Neuromuscular re-education; Manual; Home exercise program; Modalities; Dry needling; Aquatics; Therapeutic activities    See Assessment Note    Subjective Comments:   Patient reports bending and twisting in January and shortly after started to have a lot of pain in the right low back that affects everything she does  Initial Pain Level:     6/10  Post Session Pain Level:      4/10  Medications Last Reviewed: 3/11/2025  Updated Objective Findings:  See Evaluation Note from today  Treatment   Evaluation and Education today      Treatment/Session

## 2025-03-11 NOTE — THERAPY EVALUATION
Natasha Chappell  : 1960  Primary: Medicare Part A And B (Medicare)  Secondary: [employer]ARP HEALTH CARE MEDICARE SUPP [facility] [place] [facility] [address]  Phone: [phone]  Fax: [phone] Plan Frequency: 1-2 times a week for 12 weeks    Plan of Care/Certification Expiration Date: 25        Plan of Care/Certification Expiration Date:  Plan of Care/Certification Expiration Date: 25    Frequency/Duration: Plan Frequency: 1-2 times a week for 12 weeks      Time In/Out:   Time In: 0900  Time Out: 1000      PT Visit Info:         Visit Count:  1                OUTPATIENT PHYSICAL THERAPY:             Initial Assessment 3/11/2025               Episode (low back pain/thoracic spine pain)         Treatment Diagnosis:     Other low back pain  Pain in thoracic spine  Medical/Referring Diagnosis:    Low back pain  Midline thoracic back pain  Strain of muscle and tendon of back wall of thorax, initial encounter      Referring Physician:  Kiera Carrillo PA-C MD Orders:  PT Eval and Treat   Return MD Appt:  May  Date of Onset:  Onset Date: 25     Allergies:  Latex and Dexamethasone  Restrictions/Precautions:    None      Medications Last Reviewed: 3/11/2025     SUBJECTIVE   History of Injury/Illness (Reason for Referral):  Patient reports that she started to have a lot of right sided low back pain in the end of January due to maybe bending and reaching over while sitting. She did not feel anything at the time, but then had a spasm or tightening up later that has not gone away.  She has seen chiropractor, acupuncture, and massage to help, but it is still there. She has difficulty sleeping, and doing her usual yoga and other activities.  She has used muscle relaxer and meloxicam to help, but it only helps some.  She has difficulty with walking due to the pain too.    Patient Stated Goal(s):  \"back to normal activity and get rid of

## 2025-03-19 ENCOUNTER — HOSPITAL ENCOUNTER (OUTPATIENT)
Dept: PHYSICAL THERAPY | Age: 65
Setting detail: RECURRING SERIES
Discharge: HOME | End: 2025-03-22
Payer: MEDICARE

## 2025-03-19 PROCEDURE — 97110 THERAPEUTIC EXERCISES: CPT

## 2025-03-19 PROCEDURE — 97140 MANUAL THERAPY 1/> REGIONS: CPT

## 2025-03-19 ASSESSMENT — PAIN SCALES - GENERAL: PAINLEVEL_OUTOF10: 5

## 2025-03-19 NOTE — PROGRESS NOTES
below to improve mobility, strength, balance, and coordination.  Required minimal visual, verbal, manual, and tactile cues to promote proper body alignment, promote proper body posture, promote proper body mechanics, and promote proper body breathing techniques.  Progressed resistance, range, and repetitions as indicated.  -Side lie breathing with arm above head for right sided ribcage mobilization  -Jos pose modified to get elongation of right side  -Step through training for weight acceptance and hip control  MANUAL THERAPY: (30 minutes):   Joint mobilization and Soft tissue mobilization was utilized and necessary because of the patient's restricted joint motion, painful spasm, loss of articular motion, and restricted motion of soft tissue.   -Prone mobilization to right lumbar paraspinals, lower border of 12th rib, iliac  crest  -mobilization along ribcage with mini plunger  -Mobilization on rib bony contours 7-11  -Side lie pelvic mobilization for QL mobility  -Posterior depression of the pelvis to help with gait function    Treatment/Session Summary:    Treatment Assessment:   Patient shows some improvement in mobility today  Communication/Consultation:  None today  Equipment provided today:  HEP  Recommendations/Intent for next treatment session: Next visit will focus on Breathing and spinal mobility.    >Total Treatment Billable Duration: 40 minutes   Time In: 1203  Time Out: 1245     Timothy Thorne PT         Charge Capture  Events  Onzo Portal  Appt Desk  Attendance Report     Future Appointments   Date Time Provider Department Center   3/27/2025 10:00 AM Timothy Thorne, PT [facility]   4/1/2025  2:00 PM Timothy Thorne, PT [facility]   4/2/2025  1:20 PM Kaykay Murphy MD [id number]   4/9/2025  2:00 PM Timothy Thorne, PT [facility]   5/23/2025 10:40 AM Kaykay Murphy MD [id number]

## 2025-03-26 PROBLEM — Z00.00 MEDICARE ANNUAL WELLNESS VISIT, SUBSEQUENT: Status: RESOLVED | Noted: 2025-02-24 | Resolved: 2025-03-26

## 2025-03-27 ENCOUNTER — HOSPITAL ENCOUNTER (OUTPATIENT)
Dept: PHYSICAL THERAPY | Age: 65
Setting detail: RECURRING SERIES
Discharge: HOME | End: 2025-03-30
Payer: MEDICARE

## 2025-03-27 PROCEDURE — 97110 THERAPEUTIC EXERCISES: CPT

## 2025-03-27 PROCEDURE — 97140 MANUAL THERAPY 1/> REGIONS: CPT

## 2025-03-27 ASSESSMENT — PAIN SCALES - GENERAL: PAINLEVEL_OUTOF10: 6

## 2025-03-27 NOTE — PROGRESS NOTES
Natasha Chappell  : 1960  Primary: Medicare Part A And B (Medicare)  Secondary: AARP HEALTH CARE MEDICARE SUPP [facility] [place] [facility] [address]  Phone: [phone]  Fax: [phone] Plan Frequency: 1-2 times a week for 12 weeks  Plan of Care/Certification Expiration Date: 25        Plan of Care/Certification Expiration Date:  Plan of Care/Certification Expiration Date: 25    Frequency/Duration: Plan Frequency: 1-2 times a week for 12 weeks      Time In/Out:   Time In: 1005  Time Out: 1055      PT Visit Info:         Visit Count:  3    OUTPATIENT PHYSICAL THERAPY:   Treatment Note 3/27/2025       Episode  (low back pain/thoracic spine pain)               Treatment Diagnosis:    Other low back pain  Pain in thoracic spine  Medical/Referring Diagnosis:    Low back pain  Midline thoracic back pain  Strain of muscle and tendon of back wall of thorax, initial encounter      Referring Physician:  Kiera Carrillo PA-C MD Orders:  PT Eval and Treat   Return MD Appt:  May   Date of Onset:  Onset Date: 25     Allergies:   Latex and Dexamethasone  Restrictions/Precautions:   None      Interventions Planned (Treatment may consist of any combination of the following):  Current Treatment Recommendations: Strengthening; ROM; Balance training; Functional mobility training; Transfer training; Neuromuscular re-education; Manual; Home exercise program; Modalities; Dry needling; Aquatics; Therapeutic activities        Subjective Comments:   Patient reports feels a good bit of pain still.  Initial Pain Level:     6/10 (7-8 with spasms)  Post Session Pain Level:      4/10  Medications Last Reviewed: 3/27/2025  Updated Objective Findings:  Lack of good weight acceptance into R LE  Treatment   THERAPEUTIC EXERCISE: (15 minutes):    Exercises per grid below to improve mobility, strength, balance, and coordination.  Required minimal visual, verbal,

## 2025-04-01 ENCOUNTER — HOSPITAL ENCOUNTER (OUTPATIENT)
Dept: PHYSICAL THERAPY | Age: 65
Setting detail: RECURRING SERIES
Discharge: HOME | End: 2025-04-04
Payer: MEDICARE

## 2025-04-01 PROCEDURE — 97140 MANUAL THERAPY 1/> REGIONS: CPT

## 2025-04-01 PROCEDURE — 97110 THERAPEUTIC EXERCISES: CPT

## 2025-04-01 ASSESSMENT — PAIN SCALES - GENERAL: PAINLEVEL_OUTOF10: 6

## 2025-04-01 NOTE — PROGRESS NOTES
Natasha Chappell  : 1960  Primary: Medicare Part A And B (Medicare)  Secondary: [employer] HE[employer] C[employer] MEDICARE SUPP [facility] [address]  Phone: [phone]  Fax: [phone] Plan Frequency: 1-2 times a week for 12 weeks  Plan of Care/Certification Expiration Date: 25        Plan of Care/Certification Expiration Date:  Plan of Care/Certification Expiration Date: 25    Frequency/Duration: Plan Frequency: 1-2 times a week for 12 weeks      Time In/Out:   Time In: 1400  Time Out: 1456      PT Visit Info:         Visit Count:  4    OUTPATIENT PHYSICAL THERAPY:   Treatment Note 2025       Episode  (low back pain/thoracic spine pain)               Treatment Diagnosis:    Other low back pain  Pain in thoracic spine  Medical/Referring Diagnosis:    Low back pain  Midline thoracic back pain  Strain of muscle and tendon of back wall of thorax, initial encounter      Referring Physician:  Kiera Carrillo PA-C MD Orders:  PT Eval and Treat   Return MD Appt:  May   Date of Onset:  Onset Date: 25     Allergies:   Latex and Dexamethasone  Restrictions/Precautions:   None      Interventions Planned (Treatment may consist of any combination of the following):  Current Treatment Recommendations: Strengthening; ROM; Balance training; Functional mobility training; Transfer training; Neuromuscular re-education; Manual; Home exercise program; Modalities; Dry needling; Aquatics; Therapeutic activities        Subjective Comments:   Patient reports feels continue pain. Did some yoga and that did seem to help  Initial Pain Level:     6/10 ()  Post Session Pain Level:      3/10  Medications Last Reviewed: 2025  Updated Objective Findings:  Lack of good weight acceptance into R LE  Treatment   THERAPEUTIC EXERCISE: (10 minutes):    Exercises per grid below to improve mobility, strength, balance, and coordination.  Required minimal

## 2025-04-02 ENCOUNTER — OFFICE VISIT (OUTPATIENT)
Dept: PRIMARY CARE CLINIC | Facility: CLINIC | Age: 65
End: 2025-04-02
Payer: MEDICARE

## 2025-04-02 VITALS
DIASTOLIC BLOOD PRESSURE: 57 MMHG | SYSTOLIC BLOOD PRESSURE: 121 MMHG | TEMPERATURE: 98.8 F | HEIGHT: 67 IN | OXYGEN SATURATION: 99 % | HEART RATE: 51 BPM | RESPIRATION RATE: 16 BRPM | BODY MASS INDEX: 21.97 KG/M2 | WEIGHT: 140 LBS

## 2025-04-02 DIAGNOSIS — M54.50 CHRONIC RIGHT-SIDED LOW BACK PAIN WITHOUT SCIATICA: ICD-10-CM

## 2025-04-02 DIAGNOSIS — M54.6 CHRONIC THORACIC BACK PAIN, UNSPECIFIED BACK PAIN LATERALITY: ICD-10-CM

## 2025-04-02 DIAGNOSIS — G89.29 CHRONIC THORACIC BACK PAIN, UNSPECIFIED BACK PAIN LATERALITY: ICD-10-CM

## 2025-04-02 DIAGNOSIS — M51.369 DEGENERATION OF INTERVERTEBRAL DISC OF LUMBAR REGION, UNSPECIFIED WHETHER PAIN PRESENT: ICD-10-CM

## 2025-04-02 DIAGNOSIS — Z86.19 HX OF LYME DISEASE: ICD-10-CM

## 2025-04-02 DIAGNOSIS — R10.9 RIGHT FLANK PAIN: Primary | ICD-10-CM

## 2025-04-02 DIAGNOSIS — G57.93 NEUROPATHIC PAIN OF BOTH LEGS: ICD-10-CM

## 2025-04-02 DIAGNOSIS — G89.29 CHRONIC RIGHT-SIDED LOW BACK PAIN WITHOUT SCIATICA: ICD-10-CM

## 2025-04-02 DIAGNOSIS — Z71.3 DIETARY COUNSELING: ICD-10-CM

## 2025-04-02 PROCEDURE — G8399 PT W/DXA RESULTS DOCUMENT: HCPCS | Performed by: FAMILY MEDICINE

## 2025-04-02 PROCEDURE — 1090F PRES/ABSN URINE INCON ASSESS: CPT | Performed by: FAMILY MEDICINE

## 2025-04-02 PROCEDURE — 3017F COLORECTAL CA SCREEN DOC REV: CPT | Performed by: FAMILY MEDICINE

## 2025-04-02 PROCEDURE — G8427 DOCREV CUR MEDS BY ELIG CLIN: HCPCS | Performed by: FAMILY MEDICINE

## 2025-04-02 PROCEDURE — 99214 OFFICE O/P EST MOD 30 MIN: CPT | Performed by: FAMILY MEDICINE

## 2025-04-02 PROCEDURE — G8420 CALC BMI NORM PARAMETERS: HCPCS | Performed by: FAMILY MEDICINE

## 2025-04-02 PROCEDURE — 1036F TOBACCO NON-USER: CPT | Performed by: FAMILY MEDICINE

## 2025-04-02 PROCEDURE — 1123F ACP DISCUSS/DSCN MKR DOCD: CPT | Performed by: FAMILY MEDICINE

## 2025-04-02 RX ORDER — MELOXICAM 7.5 MG/1
TABLET ORAL
COMMUNITY
Start: 2025-03-07

## 2025-04-02 RX ORDER — GABAPENTIN 100 MG/1
200 CAPSULE ORAL 3 TIMES DAILY
Qty: 560 CAPSULE | Refills: 0 | Status: SHIPPED | OUTPATIENT
Start: 2025-04-02 | End: 2025-07-01

## 2025-04-02 RX ORDER — METHOCARBAMOL 500 MG/1
TABLET, FILM COATED ORAL
COMMUNITY
Start: 2025-03-07

## 2025-04-02 NOTE — PROGRESS NOTES
[facility]  [address]  [place] [address]  Office : [phone]  Fax : [phone]      Subjective:  The patient is a 61 y.o. female  who presents for f/u on persistent right  flank /right mid and low back pain for few months now  Pt seen at an urgent care  Urine neg  Xray shows DDD  Pt seen ortho  PT not helping much  Pt denies any radiating pain to lowe extremities--no numbness or weakness-no urinary symptoms  Pt thinks she could have had HERPES ZOSTER on right mid back as she had itchy rash on her right midback few months ago     multiple chronic medical conditions-good compliance with medications -pt here to get routeine labs and need refill on meds.no cardiopulmonary symptoms  Hyprlipidemia-stable on diet  CHRONIC FATIGUE from hx of lymes disease--sees specialist in [facility] twice a year-on pulse abx and several supplements  Insomnia-on meds as needed  Osteopenia--on vit d /ca supplement  Left tennis elbow--sees ortho  Palpitations-sees cardiologist-so far work up neg  Qtaeimcbjje-ylf-bgy appt with spine clinic soon-ddd also causing chronic headaches  Right sided headache-on valtrex now-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  Chronic left hip pain/left sciatica--arthritis vs sprain-doing PT-helps-interested in xray      Patient Active Problem List   Diagnosis Code    Lyme disease A69.20    Glaucoma H40.9    PLMD (periodic limb movement disorder) G47.61    Daytime sleepiness R40.0    S/P sinus surgery Z98.890    Osteoarthritis of lumbar spine M47.816    Osteopenia M85.80    Multiple thyroid nodules E04.2    Chronic fatigue R53.82    Medicare annual wellness visit, subsequent Z00.00    Palpitations R00.2    Tachycardia R00.0    Primary insomnia F51.01    Left tennis elbow M77.12    Myalgia M79.10    Burning with urination R30.0    Urinary tract infection without hematuria N39.0    Vitamin D deficiency E55.9    Hx of sinus surgery Z98.890    Pharyngitis J02.9    Sinusitis J32.9

## 2025-04-09 ENCOUNTER — HOSPITAL ENCOUNTER (OUTPATIENT)
Dept: PHYSICAL THERAPY | Age: 65
Setting detail: RECURRING SERIES
Discharge: HOME | End: 2025-04-12
Payer: MEDICARE

## 2025-04-09 PROCEDURE — 97140 MANUAL THERAPY 1/> REGIONS: CPT

## 2025-04-09 ASSESSMENT — PAIN SCALES - GENERAL: PAINLEVEL_OUTOF10: 6

## 2025-04-09 NOTE — PROGRESS NOTES
Natasha Chappell  : 1960  Primary: Medicare Part A And B (Medicare)  Secondary: [employer]ARP HE[employer] CARE MEDICARE SUPP [facility] [place] [facility] [address]  Phone: [phone]  Fax: [phone] Plan Frequency: 1-2 times a week for 12 weeks  Plan of Care/Certification Expiration Date: 25        Plan of Care/Certification Expiration Date:  Plan of Care/Certification Expiration Date: 25    Frequency/Duration: Plan Frequency: 1-2 times a week for 12 weeks      Time In/Out:   Time In: 1400  Time Out: 1458      PT Visit Info:         Visit Count:  5    OUTPATIENT PHYSICAL THERAPY:   Treatment Note 2025       Episode  (low back pain/thoracic spine pain)               Treatment Diagnosis:    Other low back pain  Pain in thoracic spine  Medical/Referring Diagnosis:    Low back pain  Midline thoracic back pain  Strain of muscle and tendon of back wall of thorax, initial encounter      Referring Physician:  Kiera Carrillo PA-C MD Orders:  PT Eval and Treat   Return MD Appt:  May   Date of Onset:  Onset Date: 25     Allergies:   Latex and Dexamethasone  Restrictions/Precautions:   None      Interventions Planned (Treatment may consist of any combination of the following):  Current Treatment Recommendations: Strengthening; ROM; Balance training; Functional mobility training; Transfer training; Neuromuscular re-education; Manual; Home exercise program; Modalities; Dry needling; Aquatics; Therapeutic activities        Subjective Comments:   Patient reports feels continue pain. She is scheduled to have CT scans  Initial Pain Level:     6/10 ()  Post Session Pain Level:      4/10  Medications Last Reviewed: 2025  Updated Objective Findings:  Lack of good weight acceptance into R LE  Treatment   THERAPEUTIC EXERCISE: (0 minutes):    Exercises per grid below to improve mobility, strength, balance, and coordination.  Required minimal visual,

## 2025-04-14 ENCOUNTER — TELEPHONE (OUTPATIENT)
Dept: PRIMARY CARE CLINIC | Facility: CLINIC | Age: 65
End: 2025-04-14

## 2025-04-14 NOTE — TELEPHONE ENCOUNTER
Patient called regarding additional abdominal pain  on the area close to the belly bottom and because she is having CT scan on Wednesday she wants Dr. Murphy aware just in case she need to add another order,please contact patient.    Thank you

## 2025-04-15 NOTE — TELEPHONE ENCOUNTER
Returned phone call,   Pt started having pain close to the belly button (right side).  Pt has an appointment for the MRI tomorrow.  She wants to know if any of tests help to check her abdomen.  Please advise.

## 2025-04-16 ENCOUNTER — RESULTS FOLLOW-UP (OUTPATIENT)
Dept: PRIMARY CARE CLINIC | Facility: CLINIC | Age: 65
End: 2025-04-16

## 2025-04-17 ENCOUNTER — TELEPHONE (OUTPATIENT)
Dept: PRIMARY CARE CLINIC | Facility: CLINIC | Age: 65
End: 2025-04-17

## 2025-04-17 NOTE — TELEPHONE ENCOUNTER
Test results don't seem to indicate anything new that could be causing this new ACUTE and SEVERE pain.  Am concerned that it has spread to abdominal area.  What could it be?   It is impacting my life as the only nonpainful position is horizontal on heating pad.      Taking gabepentin as directed.   Methocarbanol given by DAMIEN helps some but gives me blurry vision.     This is not normal and I need help getting it under control and getting to the bottom of it.

## 2025-04-18 ENCOUNTER — OFFICE VISIT (OUTPATIENT)
Dept: PRIMARY CARE CLINIC | Facility: CLINIC | Age: 65
End: 2025-04-18
Payer: MEDICARE

## 2025-04-18 VITALS
TEMPERATURE: 98.3 F | HEART RATE: 57 BPM | WEIGHT: 139.8 LBS | OXYGEN SATURATION: 99 % | BODY MASS INDEX: 21.94 KG/M2 | HEIGHT: 67 IN | SYSTOLIC BLOOD PRESSURE: 132 MMHG | DIASTOLIC BLOOD PRESSURE: 82 MMHG

## 2025-04-18 DIAGNOSIS — G47.00 INSOMNIA, UNSPECIFIED TYPE: ICD-10-CM

## 2025-04-18 DIAGNOSIS — R53.82 CHRONIC FATIGUE, UNSPECIFIED: ICD-10-CM

## 2025-04-18 DIAGNOSIS — M54.6 CHRONIC THORACIC BACK PAIN, UNSPECIFIED BACK PAIN LATERALITY: Primary | ICD-10-CM

## 2025-04-18 DIAGNOSIS — Z86.19 HX OF LYME DISEASE: ICD-10-CM

## 2025-04-18 DIAGNOSIS — G57.93 NEUROPATHIC PAIN OF BOTH LEGS: ICD-10-CM

## 2025-04-18 DIAGNOSIS — G89.29 CHRONIC THORACIC BACK PAIN, UNSPECIFIED BACK PAIN LATERALITY: Primary | ICD-10-CM

## 2025-04-18 DIAGNOSIS — M51.35 DDD (DEGENERATIVE DISC DISEASE), THORACOLUMBAR: ICD-10-CM

## 2025-04-18 DIAGNOSIS — Z71.3 DIETARY COUNSELING: ICD-10-CM

## 2025-04-18 DIAGNOSIS — G89.29 CHRONIC RIGHT-SIDED LOW BACK PAIN WITHOUT SCIATICA: ICD-10-CM

## 2025-04-18 DIAGNOSIS — M54.50 CHRONIC RIGHT-SIDED LOW BACK PAIN WITHOUT SCIATICA: ICD-10-CM

## 2025-04-18 PROCEDURE — G8428 CUR MEDS NOT DOCUMENT: HCPCS | Performed by: FAMILY MEDICINE

## 2025-04-18 PROCEDURE — G8420 CALC BMI NORM PARAMETERS: HCPCS | Performed by: FAMILY MEDICINE

## 2025-04-18 PROCEDURE — 1036F TOBACCO NON-USER: CPT | Performed by: FAMILY MEDICINE

## 2025-04-18 PROCEDURE — G8399 PT W/DXA RESULTS DOCUMENT: HCPCS | Performed by: FAMILY MEDICINE

## 2025-04-18 PROCEDURE — 1090F PRES/ABSN URINE INCON ASSESS: CPT | Performed by: FAMILY MEDICINE

## 2025-04-18 PROCEDURE — 3017F COLORECTAL CA SCREEN DOC REV: CPT | Performed by: FAMILY MEDICINE

## 2025-04-18 PROCEDURE — 1123F ACP DISCUSS/DSCN MKR DOCD: CPT | Performed by: FAMILY MEDICINE

## 2025-04-18 PROCEDURE — 99214 OFFICE O/P EST MOD 30 MIN: CPT | Performed by: FAMILY MEDICINE

## 2025-04-18 RX ORDER — GABAPENTIN 300 MG/1
300 CAPSULE ORAL 3 TIMES DAILY
Qty: 270 CAPSULE | Refills: 0 | Status: SHIPPED | OUTPATIENT
Start: 2025-04-18 | End: 2025-07-17

## 2025-04-18 RX ORDER — LIDOCAINE 50 MG/G
1 PATCH TOPICAL DAILY
Qty: 30 PATCH | Refills: 0 | Status: SHIPPED | OUTPATIENT
Start: 2025-04-18 | End: 2025-05-18

## 2025-04-18 NOTE — PROGRESS NOTES
lower limbs; Low back pain, unspecified; Other chronic pain pain in  mid and lower back starting in January no improvement with physical therapy    COMPARISON: Lumbar spine x-rays 3/20/2024, same day thoracic spine MRI    TECHNIQUE: Multisequence, multiplanar MR images were obtained of the lumbar  spine without the administration of intravenous contrast.    FINDINGS: For the purposes of this examination, there will be 5 lumbar-type  vertebral bodies with vertebral body numbering performed counting from the C2  level on the spinal localizer images for the same day thoracic spine MRI.    Small chronic Schmorl's nodes at T11-T12 and T12-L1. Mild chronic degenerative  endplate remodeling anteriorly at L2-L3. L1 hemangioma.    No evidence of acute compression fracture or suspicious osseous lesion.    The visualized portions of the distal spinal cord are of normal caliber and  signal characteristics.. The conus medullaris terminates at the L1 level. The  cauda equina is unremarkable.    The visualized abdominal soft tissues are unremarkable.      Level specific findings:    T12-L1: Unremarkable.    L1-L2: Unremarkable.    L2-L3: Mild disc bulge. No significant canal or foraminal stenosis.    L3-L4: Mild bilateral facet arthropathy. No significant canal or foraminal  stenosis.    L4-L5: Facet arthropathy with a mild disc bulge and a small left foraminal  annular tear. This results in mild left greater than right neuroforaminal  narrowing. No significant canal stenosis.    L5-S1: Slight retrolisthesis of L5 on S1. Mild facet arthropathy with minimal  disc degeneration. No significant canal or foraminal stenosis. Tiny central  protrusion, minimally impressing upon the ventral epidural fat (axial  T2-weighted image 29).    Impression  Lumbar spine degenerative changes are mild, predominated by lower lumbar spine  facet arthropathy and mild disc degeneration. Degenerative features are as fully  detailed above, including no

## 2025-04-21 ENCOUNTER — OFFICE VISIT (OUTPATIENT)
Age: 65
End: 2025-04-21
Payer: MEDICARE

## 2025-04-21 VITALS — BODY MASS INDEX: 21.97 KG/M2 | HEIGHT: 67 IN | WEIGHT: 140 LBS

## 2025-04-21 DIAGNOSIS — M54.14 THORACIC RADICULOPATHY: ICD-10-CM

## 2025-04-21 DIAGNOSIS — M51.24 THORACIC DISC HERNIATION: Primary | ICD-10-CM

## 2025-04-21 PROCEDURE — 1036F TOBACCO NON-USER: CPT | Performed by: ORTHOPAEDIC SURGERY

## 2025-04-21 PROCEDURE — 1090F PRES/ABSN URINE INCON ASSESS: CPT | Performed by: ORTHOPAEDIC SURGERY

## 2025-04-21 PROCEDURE — 3017F COLORECTAL CA SCREEN DOC REV: CPT | Performed by: ORTHOPAEDIC SURGERY

## 2025-04-21 PROCEDURE — 1123F ACP DISCUSS/DSCN MKR DOCD: CPT | Performed by: ORTHOPAEDIC SURGERY

## 2025-04-21 PROCEDURE — G8399 PT W/DXA RESULTS DOCUMENT: HCPCS | Performed by: ORTHOPAEDIC SURGERY

## 2025-04-21 PROCEDURE — 99214 OFFICE O/P EST MOD 30 MIN: CPT | Performed by: ORTHOPAEDIC SURGERY

## 2025-04-21 PROCEDURE — G8420 CALC BMI NORM PARAMETERS: HCPCS | Performed by: ORTHOPAEDIC SURGERY

## 2025-04-21 PROCEDURE — G8428 CUR MEDS NOT DOCUMENT: HCPCS | Performed by: ORTHOPAEDIC SURGERY

## 2025-04-21 NOTE — PROGRESS NOTES
contact.    Diagnosis:      ICD-10-CM    1. Thoracic disc herniation  M51.24       2. Thoracic radiculopathy  M54.14           Assessment/Plan:    Assessment & Plan  1. Thoracic disc herniation.  The patient's symptoms are consistent with a thoracic disc herniation at T10-T11, which is pinching the T10/11 nerve. This is likely causing the pain that radiates around to the front of her abdomen. The MRI of the thoracic spine confirms this diagnosis. The patient has been informed that these types of herniations often improve over time, but it can take months. She has been advised to continue with gabapentin and to titrate the dose with her primary care provider to a reasonable level. If there is no improvement after 1 to 2 months, surgical intervention may be considered.    2. Lumbar spine arthritis.  The MRI of the lumbar spine shows mild arthritis at L2-L3 with some bone spurs developing. However, there are no herniations or bulging discs, and the spinal canal is wide open with no pinched nerves. The patient has been advised that her lumbar spine is generally in good condition, and the primary issue is the thoracic disc herniation.    3. Constipation.  The MRI suggests that the patient may be constipated, which could be contributing to her symptoms. She has been advised to monitor her bowel movements and consider dietary adjustments to alleviate constipation.       We discussed the possibility of a microdiscectomy including a midline incision in over the mid back followed by dissection to the area of the disc herniation.  The nerves would be retracted to the side and the disc herniation would be trimmed along with any other structures impinging on the nerves including ligaments and bone. Once the nerve is freed the wound would be closed with suture and covered with sterile dressings. They would expect to stay in recovery for observation and be discharged the same day.  Follow-up would be scheduled for 2-3 weeks and

## 2025-04-22 ENCOUNTER — HOSPITAL ENCOUNTER (OUTPATIENT)
Dept: PHYSICAL THERAPY | Age: 65
Setting detail: RECURRING SERIES
Discharge: HOME | End: 2025-04-25
Payer: MEDICARE

## 2025-04-22 PROCEDURE — 97140 MANUAL THERAPY 1/> REGIONS: CPT

## 2025-04-22 ASSESSMENT — PAIN SCALES - GENERAL: PAINLEVEL_OUTOF10: 6

## 2025-04-22 NOTE — PROGRESS NOTES
Natasha Chappell  : 1960  Primary: Medicare Part A And B (Medicare)  Secondary: [employer]ARP HEALTH CARE MEDICARE SUPP [facility] [place] [address]  Phone: [phone]  Fax: [phone] Plan Frequency: 1-2 times a week for 12 weeks  Plan of Care/Certification Expiration Date: 25        Plan of Care/Certification Expiration Date:  Plan of Care/Certification Expiration Date: 25    Frequency/Duration: Plan Frequency: 1-2 times a week for 12 weeks      Time In/Out:   Time In: 0900  Time Out: 951      PT Visit Info:         Visit Count:  6    OUTPATIENT PHYSICAL THERAPY:   Treatment Note 2025       Episode  (low back pain/thoracic spine pain)               Treatment Diagnosis:    Other low back pain  Pain in thoracic spine  Medical/Referring Diagnosis:    Low back pain  Midline thoracic back pain  Strain of muscle and tendon of back wall of thorax, initial encounter      Referring Physician:  Kiera Carrillo PA-C MD Orders:  PT Eval and Treat   Return MD Appt:  May   Date of Onset:  Onset Date: 25     Allergies:   Latex and Dexamethasone  Restrictions/Precautions:   None      Interventions Planned (Treatment may consist of any combination of the following):  Current Treatment Recommendations: Strengthening; ROM; Balance training; Functional mobility training; Transfer training; Neuromuscular re-education; Manual; Home exercise program; Modalities; Dry needling; Aquatics; Therapeutic activities        Subjective Comments:   Patient reports that she found out that she had a disc extrusion at T10-11 that is pushing on the nerve in the space that is causing her pain. The difficult part is that it is a little harder surgery.   Initial Pain Level:     6/10 ()  Post Session Pain Level:      3/10  Medications Last Reviewed: 2025  Updated Objective Findings:  Lack of good weight acceptance into R LE  Treatment   THERAPEUTIC EXERCISE: (0

## 2025-04-29 ENCOUNTER — HOSPITAL ENCOUNTER (OUTPATIENT)
Dept: PHYSICAL THERAPY | Age: 65
Setting detail: RECURRING SERIES
Discharge: HOME | End: 2025-05-02
Payer: MEDICARE

## 2025-04-29 PROCEDURE — 97140 MANUAL THERAPY 1/> REGIONS: CPT

## 2025-04-29 ASSESSMENT — PAIN SCALES - GENERAL: PAINLEVEL_OUTOF10: 5

## 2025-04-29 NOTE — PROGRESS NOTES
and tactile cues to promote proper body alignment, promote proper body posture, promote proper body mechanics, and promote proper body breathing techniques.  Progressed resistance, range, and repetitions as indicated.  -Nu step x 5 min    MANUAL THERAPY: (40 minutes):   Joint mobilization and Soft tissue mobilization was utilized and necessary because of the patient's restricted joint motion, painful spasm, loss of articular motion, and restricted motion of soft tissue.   -Prone mobilization to right lumbar paraspinals, lower border of 12th rib, iliac  crest  -mobilization along ribcage with mini plunger  -Mobilization on rib bony contours 7-11  -Side lie pelvic mobilization for QL mobility  -Posterior depression of the pelvis to help with gait function  MODALITIES:       *  Electrical Stimulation Therapy (15) was provided with intensity adjusted throughout treatment to patient tolerance. With hot pack at Level 15.5 to right lower thoracic spine     Treatment/Session Summary:    Treatment Assessment:   Patient reports some pain decrease with stim today. Patient to follow up when she gets back from her trip.  Communication/Consultation:  None today  Equipment provided today:  HEP  Recommendations/Intent for next treatment session: Next visit will focus on Breathing and spinal mobility.    >Total Treatment Billable Duration: 55 minutes   Time In: 1002  Time Out: 1100     Timothy Thorne PT         Charge Capture  Events  [employer] Portal  Appt Desk  Attendance Report     Future Appointments   Date Time Provider Department Center   5/23/2025 10:40 AM Kaykay Murphy MD [id number] DEP   5/29/2025 10:00 AM Timothy Thorne PT [facility]

## 2025-05-19 ENCOUNTER — RESULTS FOLLOW-UP (OUTPATIENT)
Dept: PRIMARY CARE CLINIC | Facility: CLINIC | Age: 65
End: 2025-05-19

## 2025-05-19 DIAGNOSIS — G57.93 NEUROPATHIC PAIN OF BOTH LEGS: ICD-10-CM

## 2025-05-19 DIAGNOSIS — K21.9 GASTROESOPHAGEAL REFLUX DISEASE WITHOUT ESOPHAGITIS: ICD-10-CM

## 2025-05-19 DIAGNOSIS — R73.03 PREDIABETES: ICD-10-CM

## 2025-05-19 DIAGNOSIS — Z91.09 ENVIRONMENTAL ALLERGIES: ICD-10-CM

## 2025-05-19 DIAGNOSIS — R30.0 DYSURIA: ICD-10-CM

## 2025-05-19 DIAGNOSIS — Z71.3 DIETARY COUNSELING: ICD-10-CM

## 2025-05-19 DIAGNOSIS — R53.82 CHRONIC FATIGUE, UNSPECIFIED: ICD-10-CM

## 2025-05-19 DIAGNOSIS — G47.00 INSOMNIA, UNSPECIFIED TYPE: ICD-10-CM

## 2025-05-19 DIAGNOSIS — E78.5 HYPERLIPIDEMIA, UNSPECIFIED HYPERLIPIDEMIA TYPE: ICD-10-CM

## 2025-05-19 DIAGNOSIS — Z86.19 HX OF LYME DISEASE: ICD-10-CM

## 2025-05-19 DIAGNOSIS — Z00.00 MEDICARE ANNUAL WELLNESS VISIT, SUBSEQUENT: ICD-10-CM

## 2025-05-19 LAB
ALBUMIN SERPL-MCNC: 3.9 G/DL (ref 3.2–4.6)
ALBUMIN/GLOB SERPL: 1.5 (ref 1–1.9)
ALP SERPL-CCNC: 121 U/L (ref 35–104)
ALT SERPL-CCNC: 23 U/L (ref 8–45)
ANION GAP SERPL CALC-SCNC: 9 MMOL/L (ref 7–16)
AST SERPL-CCNC: 26 U/L (ref 15–37)
BASOPHILS # BLD: 0.06 K/UL (ref 0–0.2)
BASOPHILS NFR BLD: 1.2 % (ref 0–2)
BILIRUB SERPL-MCNC: 0.5 MG/DL (ref 0–1.2)
BUN SERPL-MCNC: 16 MG/DL (ref 8–23)
CALCIUM SERPL-MCNC: 9.6 MG/DL (ref 8.8–10.2)
CHLORIDE SERPL-SCNC: 102 MMOL/L (ref 98–107)
CHOLEST SERPL-MCNC: 234 MG/DL (ref 0–200)
CO2 SERPL-SCNC: 28 MMOL/L (ref 20–29)
CREAT SERPL-MCNC: 0.88 MG/DL (ref 0.6–1.1)
DIFFERENTIAL METHOD BLD: NORMAL
EOSINOPHIL # BLD: 0.07 K/UL (ref 0–0.8)
EOSINOPHIL NFR BLD: 1.4 % (ref 0.5–7.8)
ERYTHROCYTE [DISTWIDTH] IN BLOOD BY AUTOMATED COUNT: 12.8 % (ref 11.9–14.6)
EST. AVERAGE GLUCOSE BLD GHB EST-MCNC: 129 MG/DL
GLOBULIN SER CALC-MCNC: 2.7 G/DL (ref 2.3–3.5)
GLUCOSE SERPL-MCNC: 113 MG/DL (ref 70–99)
HBA1C MFR BLD: 6.1 % (ref 0–5.6)
HCT VFR BLD AUTO: 41.8 % (ref 35.8–46.3)
HDLC SERPL-MCNC: 56 MG/DL (ref 40–60)
HDLC SERPL: 4.2 (ref 0–5)
HGB BLD-MCNC: 14.2 G/DL (ref 11.7–15.4)
IMM GRANULOCYTES # BLD AUTO: 0.01 K/UL (ref 0–0.5)
IMM GRANULOCYTES NFR BLD AUTO: 0.2 % (ref 0–5)
LDLC SERPL CALC-MCNC: 164 MG/DL (ref 0–100)
LYMPHOCYTES # BLD: 1.63 K/UL (ref 0.5–4.6)
LYMPHOCYTES NFR BLD: 33.6 % (ref 13–44)
MAGNESIUM SERPL-MCNC: 2.1 MG/DL (ref 1.8–2.4)
MCH RBC QN AUTO: 32.1 PG (ref 26.1–32.9)
MCHC RBC AUTO-ENTMCNC: 34 G/DL (ref 31.4–35)
MCV RBC AUTO: 94.4 FL (ref 82–102)
MONOCYTES # BLD: 0.42 K/UL (ref 0.1–1.3)
MONOCYTES NFR BLD: 8.7 % (ref 4–12)
NEUTS SEG # BLD: 2.66 K/UL (ref 1.7–8.2)
NEUTS SEG NFR BLD: 54.9 % (ref 43–78)
NRBC # BLD: 0 K/UL (ref 0–0.2)
PLATELET # BLD AUTO: 294 K/UL (ref 150–450)
PMV BLD AUTO: 10.2 FL (ref 9.4–12.3)
POTASSIUM SERPL-SCNC: 4.8 MMOL/L (ref 3.5–5.1)
PROT SERPL-MCNC: 6.6 G/DL (ref 6.3–8.2)
RBC # BLD AUTO: 4.43 M/UL (ref 4.05–5.2)
SODIUM SERPL-SCNC: 139 MMOL/L (ref 136–145)
TRIGL SERPL-MCNC: 70 MG/DL (ref 0–150)
VLDLC SERPL CALC-MCNC: 14 MG/DL (ref 6–23)
WBC # BLD AUTO: 4.9 K/UL (ref 4.3–11.1)

## 2025-05-22 NOTE — TELEPHONE ENCOUNTER
Bad cholesterol high--follow better low fat diet   Low blood sugar--eat low carb det     All other labs stable     I called and notified the pt of these results/recommendations.  The pt verbalized understanding.

## 2025-05-23 ENCOUNTER — OFFICE VISIT (OUTPATIENT)
Dept: PRIMARY CARE CLINIC | Facility: CLINIC | Age: 65
End: 2025-05-23

## 2025-05-23 VITALS
RESPIRATION RATE: 16 BRPM | BODY MASS INDEX: 21.47 KG/M2 | DIASTOLIC BLOOD PRESSURE: 68 MMHG | HEIGHT: 67 IN | WEIGHT: 136.8 LBS | TEMPERATURE: 97.2 F | SYSTOLIC BLOOD PRESSURE: 120 MMHG | OXYGEN SATURATION: 98 % | HEART RATE: 58 BPM

## 2025-05-23 DIAGNOSIS — E78.5 HYPERLIPIDEMIA, UNSPECIFIED HYPERLIPIDEMIA TYPE: ICD-10-CM

## 2025-05-23 DIAGNOSIS — R19.03 RIGHT LOWER QUADRANT ABDOMINAL SWELLING: ICD-10-CM

## 2025-05-23 DIAGNOSIS — G47.00 INSOMNIA, UNSPECIFIED TYPE: ICD-10-CM

## 2025-05-23 DIAGNOSIS — Z86.19 HX OF LYME DISEASE: ICD-10-CM

## 2025-05-23 DIAGNOSIS — M51.35 DDD (DEGENERATIVE DISC DISEASE), THORACOLUMBAR: ICD-10-CM

## 2025-05-23 DIAGNOSIS — G89.29 CHRONIC THORACIC BACK PAIN, UNSPECIFIED BACK PAIN LATERALITY: Primary | ICD-10-CM

## 2025-05-23 DIAGNOSIS — M54.50 CHRONIC RIGHT-SIDED LOW BACK PAIN WITHOUT SCIATICA: ICD-10-CM

## 2025-05-23 DIAGNOSIS — M51.34 BULGING OF THORACIC INTERVERTEBRAL DISC: ICD-10-CM

## 2025-05-23 DIAGNOSIS — M54.6 CHRONIC THORACIC BACK PAIN, UNSPECIFIED BACK PAIN LATERALITY: Primary | ICD-10-CM

## 2025-05-23 DIAGNOSIS — Z71.3 DIETARY COUNSELING: ICD-10-CM

## 2025-05-23 DIAGNOSIS — G89.29 CHRONIC RIGHT-SIDED LOW BACK PAIN WITHOUT SCIATICA: ICD-10-CM

## 2025-05-23 DIAGNOSIS — R73.03 PREDIABETES: ICD-10-CM

## 2025-05-23 DIAGNOSIS — G57.93 NEUROPATHIC PAIN OF BOTH LEGS: ICD-10-CM

## 2025-05-23 DIAGNOSIS — Z91.09 ENVIRONMENTAL ALLERGIES: ICD-10-CM

## 2025-05-23 RX ORDER — MONTELUKAST SODIUM 10 MG/1
10 TABLET ORAL DAILY
Qty: 90 TABLET | Refills: 0 | Status: SHIPPED | OUTPATIENT
Start: 2025-05-23

## 2025-05-23 RX ORDER — MIRTAZAPINE 15 MG/1
7.5 TABLET, FILM COATED ORAL NIGHTLY
Qty: 90 TABLET | Refills: 0 | Status: SHIPPED | OUTPATIENT
Start: 2025-05-23

## 2025-05-29 ENCOUNTER — HOSPITAL ENCOUNTER (OUTPATIENT)
Dept: PHYSICAL THERAPY | Age: 65
Setting detail: RECURRING SERIES
End: 2025-05-29
Payer: MEDICARE

## 2025-05-29 PROCEDURE — 97110 THERAPEUTIC EXERCISES: CPT

## 2025-05-29 PROCEDURE — 97140 MANUAL THERAPY 1/> REGIONS: CPT

## 2025-05-29 ASSESSMENT — PAIN SCALES - GENERAL: PAINLEVEL_OUTOF10: 4

## 2025-05-29 NOTE — PROGRESS NOTES
Natasha Chappell  : 1960  Primary: Medicare Part A And B (Medicare)  Secondary: [employer]ARP HEALTH CARE [facility]IC[facility] [address]  Phone: [phone]  Fax: [phone] Plan Frequency: 1-2 times a week for 12 weeks  Plan of Care/Certification Expiration Date: 25        Plan of Care/Certification Expiration Date:  Plan of Care/Certification Expiration Date: 25    Frequency/Duration: Plan Frequency: 1-2 times a week for 12 weeks      Time In/Out:   Time In: 1002  Time Out: 1057      PT Visit Info:         Visit Count:  8    OUTPATIENT PHYSICAL THERAPY:   Treatment Note 2025       Episode  (low back pain/thoracic spine pain)               Treatment Diagnosis:    Other low back pain  Pain in thoracic spine  Medical/Referring Diagnosis:    Low back pain  Midline thoracic back pain  Strain of muscle and tendon of back wall of thorax, initial encounter      Referring Physician:  Kiera Carrillo PA-C MD Orders:  PT Eval and Treat   Return MD Appt:  May   Date of Onset:  Onset Date: 25     Allergies:   Latex and Dexamethasone  Restrictions/Precautions:   None      Interventions Planned (Treatment may consist of any combination of the following):  Current Treatment Recommendations: Strengthening; ROM; Balance training; Functional mobility training; Transfer training; Neuromuscular re-education; Manual; Home exercise program; Modalities; Dry needling; Aquatics; Therapeutic activities        Subjective Comments:   Patient reports that she has been doing some other chiropractic care with exercises that seems to help some. But still having some difficulty  Initial Pain Level:     4/10 ()  Post Session Pain Level:      2/10  Medications Last Reviewed: 2025  Updated Objective Findings:  Lack of good weight acceptance into R LE  Treatment   THERAPEUTIC EXERCISE: (15 minutes):    Exercises per grid below to improve mobility,

## 2025-06-09 ENCOUNTER — HOSPITAL ENCOUNTER (OUTPATIENT)
Dept: PHYSICAL THERAPY | Age: 65
Setting detail: RECURRING SERIES
Discharge: HOME | End: 2025-06-12
Payer: MEDICARE

## 2025-06-09 PROCEDURE — 97140 MANUAL THERAPY 1/> REGIONS: CPT

## 2025-06-09 PROCEDURE — 97110 THERAPEUTIC EXERCISES: CPT

## 2025-06-09 ASSESSMENT — PAIN SCALES - GENERAL: PAINLEVEL_OUTOF10: 3

## 2025-06-09 NOTE — PROGRESS NOTES
coordination.  Required minimal visual, verbal, manual, and tactile cues to promote proper body alignment, promote proper body posture, promote proper body mechanics, and promote proper body breathing techniques.  Progressed resistance, range, and repetitions as indicated.  -Breathing training x 10 min  -Cat cow x 10  -prone press up x 10  MANUAL THERAPY: (30 minutes):   Joint mobilization and Soft tissue mobilization was utilized and necessary because of the patient's restricted joint motion, painful spasm, loss of articular motion, and restricted motion of soft tissue.   -Prone mobilization to right lumbar paraspinals, lower border of 12th rib, iliac  crest  -mobilization along ribcage with mini plunger  -Mobilization on rib bony contours 7-11  -Side lie pelvic mobilization for QL mobility  -Posterior depression of the pelvis to help with gait function    Treatment/Session Summary:    Treatment Assessment:   Patient shows some slight improvement in breathing training. Discussed moving to once a month treatment at this time  Communication/Consultation:  None today  Equipment provided today:  HEP  Recommendations/Intent for next treatment session: Next visit will focus on Breathing and spinal mobility.    >Total Treatment Billable Duration: 55 minutes   Time In: 1000  Time Out: 1051     Timothy Thorne PT         Charge Capture  Events  [employer] Portal  Appt Desk  Attendance Report     Future Appointments   Date Time Provider Department Center   7/8/2025 10:00 AM Timothy Thorne PT [facility]   8/18/2025 10:40 AM Kaykay Murphy MD [id number] DEP         Suturegard Retention Suture: 2-0 Nylon

## 2025-06-09 NOTE — THERAPY RECERTIFICATION
Natasha Chappell  : 1960  Primary: Medicare Part A And B (Medicare)  Secondary: AARP HEALTH CARE MEDICARE SUPP [facility] [address]  Phone: [phone]  Fax: [phone] Plan Frequency: 1 time a month for 3 months    Plan of Care/Certification Expiration Date: 25        Plan of Care/Certification Expiration Date:  Plan of Care/Certification Expiration Date: 25    Frequency/Duration: Plan Frequency: 1 time a month for 3 months      Time In/Out:   Time In: 1000  Time Out: 1051      PT Visit Info:         Visit Count:  9                OUTPATIENT PHYSICAL THERAPY:             Recertification 2025               Episode (low back pain/thoracic spine pain)         Treatment Diagnosis:     Other low back pain  Pain in thoracic spine  Medical/Referring Diagnosis:    Low back pain  Midline thoracic back pain  Strain of muscle and tendon of back wall of thorax, initial encounter      Referring Physician:  Kiera Carrillo PA-C MD Orders:  PT Eval and Treat   Return MD Appt:  May  Date of Onset:  Onset Date: 25     Allergies:  Latex and Dexamethasone  Restrictions/Precautions:    None      Medications Last Reviewed: 2025     SUBJECTIVE   History of Injury/Illness (Reason for Referral):  Patient reports that she started to have a lot of right sided low back pain in the end of January due to maybe bending and reaching over while sitting. She did not feel anything at the time, but then had a spasm or tightening up later that has not gone away.  She has seen chiropractor, acupuncture, and massage to help, but it is still there. She has difficulty sleeping, and doing her usual yoga and other activities.  She has used muscle relaxer and meloxicam to help, but it only helps some.  She has difficulty with walking due to the pain too.    Patient Stated Goal(s):  \"back to normal activity and get rid of pain\"  Initial Pain Level:

## 2025-06-12 DIAGNOSIS — G47.00 INSOMNIA, UNSPECIFIED TYPE: ICD-10-CM

## 2025-06-12 DIAGNOSIS — Z86.19 HX OF LYME DISEASE: ICD-10-CM

## 2025-06-12 RX ORDER — MIRTAZAPINE 15 MG/1
7.5 TABLET, FILM COATED ORAL NIGHTLY
Qty: 90 TABLET | Refills: 0 | OUTPATIENT
Start: 2025-06-12

## 2025-07-08 ENCOUNTER — HOSPITAL ENCOUNTER (OUTPATIENT)
Dept: PHYSICAL THERAPY | Age: 65
Setting detail: RECURRING SERIES
Discharge: HOME | End: 2025-07-11
Payer: MEDICARE

## 2025-07-08 PROCEDURE — 97140 MANUAL THERAPY 1/> REGIONS: CPT

## 2025-07-08 PROCEDURE — 97110 THERAPEUTIC EXERCISES: CPT

## 2025-07-08 ASSESSMENT — PAIN SCALES - GENERAL: PAINLEVEL_OUTOF10: 3

## 2025-07-08 NOTE — PROGRESS NOTES
Natasha Chappell  : 1960  Primary: Medicare Part A And B (Medicare)  Secondary: AARP HEALTH CARE MEDICARE SUPP [facility] [place] [facility] [address]  Phone: [phone]  Fax: [phone] Plan Frequency: 1 time a month for 3 months  Plan of Care/Certification Expiration Date: 25        Plan of Care/Certification Expiration Date:  Plan of Care/Certification Expiration Date: 25    Frequency/Duration: Plan Frequency: 1 time a month for 3 months      Time In/Out:   Time In: 1005  Time Out: 1054      PT Visit Info:         Visit Count:  10    OUTPATIENT PHYSICAL THERAPY:   Treatment Note 2025       Episode  (low back pain/thoracic spine pain)               Treatment Diagnosis:    Other low back pain  Pain in thoracic spine  Medical/Referring Diagnosis:    Low back pain  Midline thoracic back pain  Strain of muscle and tendon of back wall of thorax, initial encounter      Referring Physician:  Kiera Carrillo PA-C MD Orders:  PT Eval and Treat   Return MD Appt:  May   Date of Onset:  Onset Date: 25     Allergies:   Latex and Dexamethasone  Restrictions/Precautions:   None      Interventions Planned (Treatment may consist of any combination of the following):  Current Treatment Recommendations: Strengthening; ROM; Balance training; Functional mobility training; Transfer training; Neuromuscular re-education; Manual; Home exercise program; Modalities; Dry needling; Aquatics; Therapeutic activities        Subjective Comments:   Patient reports that she has been a little better with movement and exercise. She feels stiff at times. Feels better with movement than being stiff  Initial Pain Level:     3/10   Post Session Pain Level:      10  Medications Last Reviewed: 2025  Updated Objective Findings:  Lack of good weight acceptance into R LE  Treatment   THERAPEUTIC EXERCISE: (15 minutes):    Exercises per grid below to improve mobility,

## 2025-08-05 ENCOUNTER — HOSPITAL ENCOUNTER (OUTPATIENT)
Dept: PHYSICAL THERAPY | Age: 65
Setting detail: RECURRING SERIES
Discharge: HOME | End: 2025-08-08
Payer: MEDICARE

## 2025-08-05 PROCEDURE — 97140 MANUAL THERAPY 1/> REGIONS: CPT

## 2025-08-05 ASSESSMENT — PAIN SCALES - GENERAL: PAINLEVEL_OUTOF10: 5

## 2025-08-15 DIAGNOSIS — M54.50 CHRONIC RIGHT-SIDED LOW BACK PAIN WITHOUT SCIATICA: ICD-10-CM

## 2025-08-15 DIAGNOSIS — R73.03 PREDIABETES: ICD-10-CM

## 2025-08-15 DIAGNOSIS — M54.6 CHRONIC THORACIC BACK PAIN, UNSPECIFIED BACK PAIN LATERALITY: ICD-10-CM

## 2025-08-15 DIAGNOSIS — M51.35 DDD (DEGENERATIVE DISC DISEASE), THORACOLUMBAR: ICD-10-CM

## 2025-08-15 DIAGNOSIS — E78.5 HYPERLIPIDEMIA, UNSPECIFIED HYPERLIPIDEMIA TYPE: ICD-10-CM

## 2025-08-15 DIAGNOSIS — G47.00 INSOMNIA, UNSPECIFIED TYPE: ICD-10-CM

## 2025-08-15 DIAGNOSIS — G89.29 CHRONIC THORACIC BACK PAIN, UNSPECIFIED BACK PAIN LATERALITY: ICD-10-CM

## 2025-08-15 DIAGNOSIS — Z86.19 HX OF LYME DISEASE: ICD-10-CM

## 2025-08-15 DIAGNOSIS — G57.93 NEUROPATHIC PAIN OF BOTH LEGS: ICD-10-CM

## 2025-08-15 DIAGNOSIS — Z91.09 ENVIRONMENTAL ALLERGIES: ICD-10-CM

## 2025-08-15 DIAGNOSIS — G89.29 CHRONIC RIGHT-SIDED LOW BACK PAIN WITHOUT SCIATICA: ICD-10-CM

## 2025-08-15 LAB
ALBUMIN SERPL-MCNC: 3.9 G/DL (ref 3.2–4.6)
ALBUMIN/GLOB SERPL: 1.4 (ref 1–1.9)
ALP SERPL-CCNC: 116 U/L (ref 35–104)
ALT SERPL-CCNC: 28 U/L (ref 8–45)
ANION GAP SERPL CALC-SCNC: 11 MMOL/L (ref 7–16)
AST SERPL-CCNC: 25 U/L (ref 15–37)
BILIRUB SERPL-MCNC: 0.6 MG/DL (ref 0–1.2)
BUN SERPL-MCNC: 15 MG/DL (ref 8–23)
CALCIUM SERPL-MCNC: 9.5 MG/DL (ref 8.8–10.2)
CHLORIDE SERPL-SCNC: 102 MMOL/L (ref 98–107)
CHOLEST SERPL-MCNC: 223 MG/DL (ref 0–200)
CO2 SERPL-SCNC: 27 MMOL/L (ref 20–29)
CREAT SERPL-MCNC: 0.81 MG/DL (ref 0.6–1.1)
CRP SERPL-MCNC: <0.3 MG/DL (ref 0–0.4)
EST. AVERAGE GLUCOSE BLD GHB EST-MCNC: 123 MG/DL
GLOBULIN SER CALC-MCNC: 2.7 G/DL (ref 2.3–3.5)
GLUCOSE SERPL-MCNC: 98 MG/DL (ref 70–99)
HBA1C MFR BLD: 5.9 % (ref 0–5.6)
HDLC SERPL-MCNC: 62 MG/DL (ref 40–60)
HDLC SERPL: 3.6 (ref 0–5)
LDLC SERPL CALC-MCNC: 145 MG/DL (ref 0–100)
POTASSIUM SERPL-SCNC: 5.2 MMOL/L (ref 3.5–5.1)
PROT SERPL-MCNC: 6.6 G/DL (ref 6.3–8.2)
SODIUM SERPL-SCNC: 140 MMOL/L (ref 136–145)
TRIGL SERPL-MCNC: 79 MG/DL (ref 0–150)
VLDLC SERPL CALC-MCNC: 16 MG/DL (ref 6–23)

## 2025-08-18 ENCOUNTER — OFFICE VISIT (OUTPATIENT)
Dept: PRIMARY CARE CLINIC | Facility: CLINIC | Age: 65
End: 2025-08-18
Payer: MEDICARE

## 2025-08-18 VITALS
HEART RATE: 55 BPM | WEIGHT: 141 LBS | DIASTOLIC BLOOD PRESSURE: 59 MMHG | HEIGHT: 67 IN | TEMPERATURE: 97.2 F | RESPIRATION RATE: 16 BRPM | BODY MASS INDEX: 22.13 KG/M2 | SYSTOLIC BLOOD PRESSURE: 112 MMHG | OXYGEN SATURATION: 98 %

## 2025-08-18 DIAGNOSIS — G89.29 CHRONIC RIGHT-SIDED LOW BACK PAIN WITHOUT SCIATICA: ICD-10-CM

## 2025-08-18 DIAGNOSIS — R73.03 PREDIABETES: ICD-10-CM

## 2025-08-18 DIAGNOSIS — R10.9 RIGHT SIDED ABDOMINAL PAIN: Primary | ICD-10-CM

## 2025-08-18 DIAGNOSIS — G89.29 CHRONIC THORACIC BACK PAIN, UNSPECIFIED BACK PAIN LATERALITY: ICD-10-CM

## 2025-08-18 DIAGNOSIS — E78.5 HYPERLIPIDEMIA, UNSPECIFIED HYPERLIPIDEMIA TYPE: ICD-10-CM

## 2025-08-18 DIAGNOSIS — G57.93 NEUROPATHIC PAIN OF BOTH LEGS: ICD-10-CM

## 2025-08-18 DIAGNOSIS — M54.6 CHRONIC THORACIC BACK PAIN, UNSPECIFIED BACK PAIN LATERALITY: ICD-10-CM

## 2025-08-18 DIAGNOSIS — Z86.19 HX OF LYME DISEASE: ICD-10-CM

## 2025-08-18 DIAGNOSIS — E87.5 HYPERKALEMIA: ICD-10-CM

## 2025-08-18 DIAGNOSIS — Z71.3 DIETARY COUNSELING: ICD-10-CM

## 2025-08-18 DIAGNOSIS — M54.50 CHRONIC RIGHT-SIDED LOW BACK PAIN WITHOUT SCIATICA: ICD-10-CM

## 2025-08-18 PROCEDURE — 1036F TOBACCO NON-USER: CPT | Performed by: FAMILY MEDICINE

## 2025-08-18 PROCEDURE — G8420 CALC BMI NORM PARAMETERS: HCPCS | Performed by: FAMILY MEDICINE

## 2025-08-18 PROCEDURE — G2211 COMPLEX E/M VISIT ADD ON: HCPCS | Performed by: FAMILY MEDICINE

## 2025-08-18 PROCEDURE — G8399 PT W/DXA RESULTS DOCUMENT: HCPCS | Performed by: FAMILY MEDICINE

## 2025-08-18 PROCEDURE — 1123F ACP DISCUSS/DSCN MKR DOCD: CPT | Performed by: FAMILY MEDICINE

## 2025-08-18 PROCEDURE — G8427 DOCREV CUR MEDS BY ELIG CLIN: HCPCS | Performed by: FAMILY MEDICINE

## 2025-08-18 PROCEDURE — 1090F PRES/ABSN URINE INCON ASSESS: CPT | Performed by: FAMILY MEDICINE

## 2025-08-18 PROCEDURE — 99214 OFFICE O/P EST MOD 30 MIN: CPT | Performed by: FAMILY MEDICINE

## 2025-08-18 PROCEDURE — 3017F COLORECTAL CA SCREEN DOC REV: CPT | Performed by: FAMILY MEDICINE

## 2025-08-18 RX ORDER — FAMOTIDINE 20 MG/1
20 TABLET, FILM COATED ORAL DAILY
Qty: 90 TABLET | Refills: 1 | Status: SHIPPED | OUTPATIENT
Start: 2025-08-18

## 2025-08-18 RX ORDER — GABAPENTIN 100 MG/1
200 CAPSULE ORAL NIGHTLY
Qty: 180 CAPSULE | Refills: 1 | Status: SHIPPED | OUTPATIENT
Start: 2025-08-18 | End: 2026-02-14

## 2025-08-18 RX ORDER — RIFABUTIN 150 MG/1
CAPSULE ORAL
COMMUNITY
Start: 2025-07-28

## 2025-08-28 ENCOUNTER — HOSPITAL ENCOUNTER (OUTPATIENT)
Dept: ULTRASOUND IMAGING | Age: 65
Discharge: HOME | End: 2025-08-30
Payer: MEDICARE

## 2025-08-28 DIAGNOSIS — R10.9 RIGHT SIDED ABDOMINAL PAIN: ICD-10-CM

## 2025-08-28 PROCEDURE — 76705 ECHO EXAM OF ABDOMEN: CPT

## (undated) DEVICE — INSTRUMENT TRACKER 9733533XOM ENT 1PK

## (undated) DEVICE — GEL US 20GM NONIRRITATING OVERWRAPPED FILE PCH TRNSMIT

## (undated) DEVICE — FIRM 4CM: Brand: NASOPORE

## (undated) DEVICE — HEAD AND NECK: Brand: MEDLINE INDUSTRIES, INC.

## (undated) DEVICE — SOLUTION LACTATED RINGERS INJECTION USP

## (undated) DEVICE — SUTURE CHROMIC GUT SZ 5-0 L18IN ABSRB BRN P-3 L13MM 3/8 CIR 687G

## (undated) DEVICE — SPONGE,NEURO,1"X3",XR,STRL,LF,10/PK: Brand: MEDLINE

## (undated) DEVICE — BLADE 1884080EM TRICUT 4MMX13CM M4 ROHS: Brand: FUSION®

## (undated) DEVICE — BLADE 1882040 5PK INFERIOR TURB 2MM

## (undated) DEVICE — SPLINT NSL SEPTAL SUPP REG PRE PUNCHED HOLE SIL STRL BRTH EZ

## (undated) DEVICE — SUT ETHLN 3-0 18IN FS1 BLK --

## (undated) DEVICE — 2000CC GUARDIAN II: Brand: GUARDIAN

## (undated) DEVICE — GLOVE ORANGE PI 8 1/2   MSG9085

## (undated) DEVICE — MEDI-VAC NON-CONDUCTIVE SUCTION TUBING: Brand: CARDINAL HEALTH

## (undated) DEVICE — KIT PROCEDURE SURG HEAD AND NECK TOTE

## (undated) DEVICE — CANISTER, RIGID, 2000CC: Brand: MEDLINE INDUSTRIES, INC.

## (undated) DEVICE — NEEDLE SPNL 22GA L3.5IN BLK HUB S STL REG WALL FIT STYL W/

## (undated) DEVICE — REM POLYHESIVE ADULT PATIENT RETURN ELECTRODE: Brand: VALLEYLAB

## (undated) DEVICE — SOLUTION IV 1000ML 0.9% SOD CHL

## (undated) DEVICE — PATIENT TRACKER 9734887XOM NON-INVASIVE

## (undated) DEVICE — ELECTROSURGICAL SUCTION COAGULATOR 10FR

## (undated) DEVICE — TUBING 1895522 5PK STRAIGHTSHOT TO XPS: Brand: STRAIGHTSHOT®

## (undated) DEVICE — SOL ANTI-FOG 6ML MEDC -- MEDICHOICE - CONVERT TO 358427

## (undated) DEVICE — STYLUS NSL BREATHING VIVAER ARC